# Patient Record
Sex: FEMALE | Race: WHITE | NOT HISPANIC OR LATINO | Employment: PART TIME | URBAN - METROPOLITAN AREA
[De-identification: names, ages, dates, MRNs, and addresses within clinical notes are randomized per-mention and may not be internally consistent; named-entity substitution may affect disease eponyms.]

---
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
## 2020-01-20 ENCOUNTER — TELEPHONE (OUTPATIENT)
Dept: OPHTHALMOLOGY | Facility: CLINIC | Age: 20
End: 2020-01-20

## 2020-01-22 ENCOUNTER — TELEPHONE (OUTPATIENT)
Dept: OPHTHALMOLOGY | Facility: CLINIC | Age: 20
End: 2020-01-22

## 2020-03-06 ENCOUNTER — TELEPHONE (OUTPATIENT)
Dept: OPHTHALMOLOGY | Facility: CLINIC | Age: 20
End: 2020-03-06

## 2020-03-06 NOTE — TELEPHONE ENCOUNTER
Due to a change in the clinic schedule for Dr. Fam, the appointment on 03/27/2020 needs to be rescheduled.      A message was left for patient/family requesting a call back to reschedule the appointment.  The clinic phone number was provided.    Aileen Childers

## 2020-03-18 DIAGNOSIS — Q13.4 PETER'S ANOMALY: ICD-10-CM

## 2020-03-18 RX ORDER — BRIMONIDINE TARTRATE 1 MG/ML
1 SOLUTION/ DROPS OPHTHALMIC 2 TIMES DAILY
Qty: 15 ML | Refills: 3 | Status: SHIPPED | OUTPATIENT
Start: 2020-03-18 | End: 2020-07-15

## 2020-03-18 NOTE — TELEPHONE ENCOUNTER
brimonidine (ALPHAGAN P) 0.1 % ophthalmic solution  Diagnosis:  Peter's anomaly   Requested directions: Place 1 drop into both eyes 2 times daily   Current directions on the medication list: same    Last Written Prescription Date:  3/11/19  Last Fill Quantity: 15 ml,   # refills: 11    Last Office Visit: 9/23/19  Future Office visit: 7/15/20    Attending Provider: Bianka Reyna MD  Last Clinic Note: 9/23/19  Current Ocular Meds:  Dorzolamide  BID, OU and timolol both eyes twice a day   Alphagan BID, OU  Xalatan QHS, OU  FML BID, OU  Refresh PM  QHS PRN   Plan  - Continue current glaucoma drops  - Follow up with Dr. Ash and Dr. Fam annually  - RTC 1 year dilated

## 2020-03-23 DIAGNOSIS — Q15.0 CONGENITAL GLAUCOMA: ICD-10-CM

## 2020-03-23 DIAGNOSIS — Z94.7 CORNEA REPLACED BY TRANSPLANT: ICD-10-CM

## 2020-03-31 RX ORDER — FLUOROMETHOLONE 0.1 %
SUSPENSION, DROPS(FINAL DOSAGE FORM)(ML) OPHTHALMIC (EYE)
Qty: 15 ML | Refills: 11 | Status: SHIPPED | OUTPATIENT
Start: 2020-03-31 | End: 2020-07-15

## 2020-03-31 RX ORDER — LATANOPROST 50 UG/ML
1 SOLUTION/ DROPS OPHTHALMIC AT BEDTIME
Qty: 7.5 ML | Refills: 3 | Status: SHIPPED | OUTPATIENT
Start: 2020-03-31 | End: 2020-07-15

## 2020-03-31 NOTE — TELEPHONE ENCOUNTER
Medication: latanoprost (XALATAN) 0.005 % ophthalmic solution   Diagnosis: Congenital glaucoma   Requested directions: Place 1 drop into both eyes At Bedtime   Current directions on the medication list: same    Last Written Prescription Date:  3/11/19  Last Fill Quantity: 7.5 ml,   # refills: 11    Last Office Visit: 9/23/19  Future Office visit: 9/24/20  Attending Provider: Bianka Reyna MD   Last Clinic Note: Plan  - Continue current glaucoma drops  Follow up with Dr. Ash and Dr. Fam annually  - RTC 1 year dilated     Refill to pharmacy.    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

## 2020-04-20 ENCOUNTER — TELEPHONE (OUTPATIENT)
Dept: OPHTHALMOLOGY | Facility: CLINIC | Age: 20
End: 2020-04-20

## 2020-04-20 NOTE — TELEPHONE ENCOUNTER
Due to the covid-19 pandemic.  The patient's appointment with Dr. Fam on 5/1/20 needs to be deferred for up to 6 months.      Left a voicemail for patient. Due to COVID-19, we are only seeing urgent patients. The appointment on 5/1/20 has been cancelled. We will call to reschedule once clinic resumes normal hours. Clinic phone number was provided for questions.     -Salma Daniel

## 2020-06-05 ENCOUNTER — TELEPHONE (OUTPATIENT)
Dept: OPHTHALMOLOGY | Facility: CLINIC | Age: 20
End: 2020-06-05

## 2020-06-05 NOTE — TELEPHONE ENCOUNTER
Called and left voicemail for patient family about scheduling a new appointment with Dr Fam. Clinic phone number was provided.    Marilin Cannon

## 2020-07-15 ENCOUNTER — OFFICE VISIT (OUTPATIENT)
Dept: OPHTHALMOLOGY | Facility: CLINIC | Age: 20
End: 2020-07-15
Attending: OPHTHALMOLOGY
Payer: COMMERCIAL

## 2020-07-15 DIAGNOSIS — H52.32 ANISOMETROPIA AND ANISEIKONIA: ICD-10-CM

## 2020-07-15 DIAGNOSIS — Q13.4 PETER'S ANOMALY: ICD-10-CM

## 2020-07-15 DIAGNOSIS — H52.31 ANISOMETROPIA AND ANISEIKONIA: ICD-10-CM

## 2020-07-15 DIAGNOSIS — Z94.7 CORNEA REPLACED BY TRANSPLANT: Primary | ICD-10-CM

## 2020-07-15 DIAGNOSIS — Q15.0 CONGENITAL GLAUCOMA: ICD-10-CM

## 2020-07-15 DIAGNOSIS — H02.20C LAGOPHTHALMOS OF BOTH UPPER AND LOWER EYELIDS OF BOTH EYES, UNSPECIFIED LAGOPHTHALMOS TYPE: ICD-10-CM

## 2020-07-15 RX ORDER — LATANOPROST 50 UG/ML
1 SOLUTION/ DROPS OPHTHALMIC AT BEDTIME
Qty: 7.5 ML | Refills: 3 | Status: SHIPPED | OUTPATIENT
Start: 2020-07-15 | End: 2021-07-28

## 2020-07-15 RX ORDER — BRIMONIDINE TARTRATE 1 MG/ML
1 SOLUTION/ DROPS OPHTHALMIC 2 TIMES DAILY
Qty: 15 ML | Refills: 3 | Status: SHIPPED | OUTPATIENT
Start: 2020-07-15 | End: 2021-01-26

## 2020-07-15 RX ORDER — DORZOLAMIDE HYDROCHLORIDE AND TIMOLOL MALEATE 20; 5 MG/ML; MG/ML
1 SOLUTION/ DROPS OPHTHALMIC 2 TIMES DAILY
Qty: 1 BOTTLE | Refills: 11 | Status: SHIPPED | OUTPATIENT
Start: 2020-07-15 | End: 2021-07-28

## 2020-07-15 RX ORDER — FLUOROMETHOLONE 0.1 %
SUSPENSION, DROPS(FINAL DOSAGE FORM)(ML) OPHTHALMIC (EYE)
Qty: 15 ML | Refills: 11 | Status: SHIPPED | OUTPATIENT
Start: 2020-07-15 | End: 2021-07-28

## 2020-07-15 ASSESSMENT — SLIT LAMP EXAM - LIDS
COMMENTS: NORMAL
COMMENTS: NORMAL

## 2020-07-15 ASSESSMENT — REFRACTION_WEARINGRX
OS_CYLINDER: +3.00
OD_CYLINDER: +5.50
OD_AXIS: 120
OS_SPHERE: -6.50
OS_AXIS: 120
OD_SPHERE: -12.50

## 2020-07-15 ASSESSMENT — TONOMETRY
OS_IOP_MMHG: 9
IOP_METHOD: APPLANATION
OD_IOP_MMHG: 11

## 2020-07-15 ASSESSMENT — VISUAL ACUITY
OS_CC: 20/70
METHOD: SNELLEN - LINEAR
OS_CC+: -1
CORRECTION_TYPE: GLASSES
OD_PH_CC: 20/500

## 2020-07-15 ASSESSMENT — CONF VISUAL FIELD
OD_SUPERIOR_NASAL_RESTRICTION: 1
OD_INFERIOR_NASAL_RESTRICTION: 1
OS_INFERIOR_TEMPORAL_RESTRICTION: 1
OD_INFERIOR_TEMPORAL_RESTRICTION: 1
OD_SUPERIOR_TEMPORAL_RESTRICTION: 1
OS_SUPERIOR_TEMPORAL_RESTRICTION: 1

## 2020-07-15 ASSESSMENT — EXTERNAL EXAM - RIGHT EYE: OD_EXAM: MILD HYPOGLOBUS

## 2020-07-15 ASSESSMENT — EXTERNAL EXAM - LEFT EYE: OS_EXAM: NORMAL

## 2020-07-15 NOTE — PROGRESS NOTES
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    Interval Hx:   Patient reports that her vision has been stable. The eyes are comfortable. No eye pain, tearing, redness.      Current Ocular Meds:  Dorzolamide BID, OU  Alphagan BID OU  Xalatan QHS OU  FML BID OU  Refresh PM QHS PRN each eye  Artificial tears PRN each eye      A/P:  1. Pressley anomaly both eyes 2000                        - S/P bilateral penetrating keratoplasty (PK)             - band keratopathy inferiorly OD                        - doing well, grafts clear                        - continue FML twice a day both eyes (IOP good, 7/7 today)                        - f/u 1 year Cornea, sooner PRN             - evaluated by Dr. Garcia and fit for CL's though difficulty with removal; happy with vision in glasses             - patient to consider EDTA chelation vs repeat PKP OD - would recommend EDTA chelation first (discussed extensively)             - f/u with Dr. Fam in 08/20     2. 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 Glaucoma, each eye                         - on xalatan, alphagan, and cosopt (timolol and dorzolamide separately now) OU                        - intraocular pressure good and stable    4. Lagophthalmos, each eye             - clear corneas, pt comfortable                         - continue refresh at bedtime both eyes     Follow up with Dr. Fam in 8/2020 and Dr. Brown in 9/2020 as scheduled    RTC with Dr. Salinas in a year    Deandre Torres MD  Ophthalmology PGY-3    Attending Physician Attestation:  Complete documentation of historical and exam elements from today's encounter can be found in the full encounter summary report (not reduplicated in this progress note).  I personally obtained the chief complaint(s) and history of present  illness.  I confirmed and edited as necessary the review of systems, past medical/surgical history, family history, social history, and examination findings as documented by others; and I examined the patient myself.  I personally reviewed the relevant tests, images, and reports as documented above.  I formulated and edited as necessary the assessment and plan and discussed the findings and management plan with the patient and family. - Carter Salinas MD    I personally spent great than 40min with the patient, of which >50% of the time was spent face to face with the patient, counseling and coordinating care with the patient. We discussed the complexity of her diagnosis, the need for further information prior to proceeding with yet another surgery, and the unknown prognosis for the patient at this time.    Carter Salinas MD

## 2020-07-15 NOTE — NURSING NOTE
No chief complaint on file.     Chief Complaint(s) and History of Present Illness(es)     1 year follow up S/P bilateral penetrating keratoplasty   No concerns of vision either eye per patient.  Dad agrees.  Complains of light sensitivity.  Eye meds:  Dorzolamide  BID, OU and timolol both eyes twice a day   Alphagan BID, OU  Xalatan QHS, OU  FML BID, OU  Refresh PM  QHS PRN    BHANU Juárez 7/15/2020 8:08 AM

## 2020-08-13 ENCOUNTER — TELEPHONE (OUTPATIENT)
Dept: OPHTHALMOLOGY | Facility: CLINIC | Age: 20
End: 2020-08-13

## 2020-08-13 NOTE — TELEPHONE ENCOUNTER
Left a voicemail to confirm the appointment for Friday, 08/14/2020.Advised of clinic changes due to Covid-19 (visitor restrictions, bring own mask, etc.) Clinic phone number provided for questions.    -Aileen Childers

## 2020-08-14 ENCOUNTER — OFFICE VISIT (OUTPATIENT)
Dept: OPHTHALMOLOGY | Facility: CLINIC | Age: 20
End: 2020-08-14
Attending: OPHTHALMOLOGY
Payer: COMMERCIAL

## 2020-08-14 DIAGNOSIS — Q93.59: ICD-10-CM

## 2020-08-14 DIAGNOSIS — H55.01 NYSTAGMUS, CONGENITAL: ICD-10-CM

## 2020-08-14 DIAGNOSIS — Z94.7 CORNEA REPLACED BY TRANSPLANT: ICD-10-CM

## 2020-08-14 DIAGNOSIS — H52.32 ANISOMETROPIA AND ANISEIKONIA: ICD-10-CM

## 2020-08-14 DIAGNOSIS — Q13.4 PETER'S ANOMALY: ICD-10-CM

## 2020-08-14 DIAGNOSIS — H50.111 EXOTROPIA OF RIGHT EYE: ICD-10-CM

## 2020-08-14 DIAGNOSIS — H21.563 PUPIL IRREGULAR OF BOTH EYES: ICD-10-CM

## 2020-08-14 DIAGNOSIS — Q15.0 CONGENITAL GLAUCOMA: Primary | ICD-10-CM

## 2020-08-14 DIAGNOSIS — H52.31 ANISOMETROPIA AND ANISEIKONIA: ICD-10-CM

## 2020-08-14 DIAGNOSIS — H54.3 LOW VISION, BOTH EYES: ICD-10-CM

## 2020-08-14 PROCEDURE — 92015 DETERMINE REFRACTIVE STATE: CPT | Mod: ZF

## 2020-08-14 PROCEDURE — G0463 HOSPITAL OUTPT CLINIC VISIT: HCPCS | Mod: ZF | Performed by: TECHNICIAN/TECHNOLOGIST

## 2020-08-14 ASSESSMENT — VISUAL ACUITY
OS_CC: 20/80
METHOD: SNELLEN - LINEAR
CORRECTION_TYPE: GLASSES
OS_CC+: +1

## 2020-08-14 ASSESSMENT — EXTERNAL EXAM - RIGHT EYE: OD_EXAM: MILD HYPOGLOBUS

## 2020-08-14 ASSESSMENT — EXTERNAL EXAM - LEFT EYE: OS_EXAM: NORMAL

## 2020-08-14 ASSESSMENT — REFRACTION_WEARINGRX
OS_AXIS: 120
OD_SPHERE: -12.50
OS_CYLINDER: +3.00
OD_CYLINDER: +6.00
SPECS_TYPE: SVL
OS_SPHERE: -6.50
OD_AXIS: 120

## 2020-08-14 ASSESSMENT — REFRACTION
OD_CYLINDER: SPHERE
OS_SPHERE: -7.00
OD_SPHERE: -4.50
OS_AXIS: 120
OS_CYLINDER: +3.00

## 2020-08-14 ASSESSMENT — TONOMETRY
IOP_METHOD: ICARE
OS_IOP_MMHG: 17
OD_IOP_MMHG: 16

## 2020-08-14 ASSESSMENT — SLIT LAMP EXAM - LIDS
COMMENTS: NORMAL
COMMENTS: NORMAL

## 2020-08-14 NOTE — LETTER
8/14/2020    To: Cailin Maxwell MD  [facility]  [address]  [facility] [address]    Re:  Ameena Solorzano    YOB: 2000    MRN: [id number]    Dear Colleague,     It was my pleasure to see Ameena on 8/14/2020.  In summary, Ameena Solorzano is a 19 year old female who presents with:     Congenital glaucoma  Excellent intraocular pressure by [employer] today.  - Continue present management: cosopt twice a day, alphagan twice a day, xalatan at bedtime. All both eyes. Establishing 9/2020 with Dr. Brown. Will complete fundus photos as able at upcoming bilateral eye examination under anesthesia as below.    Band keratopathy right eye   Post corneal transplant (PKP) both eyes   Peter's anomaly  Follows with Dr. Salinas. Agree with recommendation to try EDTA to clear right visual axis. May need another transplant in the future.   - Continue present management per Dr. Salinas with fluorometholone twice a day and refresh pm at bedtime both eyes.   - Will schedule bilateral eye examination under anesthesia at time of Dr. Salinas's case to allow for dilated fundus exam and attempt fundus photos.    Anisometropia and aniseikonia  Irregular astigmatism of both eyes  Low vision, both eyes  Best corrected visual acuity in 2019 ranged from 20/125-20/200 right eye. This year has been reduced to 15/600 today and was similarly reduced at her 7/2020 Cornea visit at 20/200 at 3 feet. The left eye visual acuity is stable at 20/80+.  - Updated glasses prescription provided. Continue full time glasses wear.  - Care for band keratopathy to improve visual acuity as above.     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  - Monitor.    Exotropia of right eye  Stable. Monitor.    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 Greatly limits posterior views in clinic.     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     Thank you for the opportunity to care for Ameena. I have asked her to Return in about 1 year (around 8/14/2021).  Until then, please do not hesitate to contact me or my clinic with any questions or concerns.          Warm regards,          Romina Fam MD                 Pediatric Ophthalmology & Strabismus        Department of Ophthalmology & Visual Neurosciences        [facility]   CC:  Ameena Solorzano

## 2020-08-14 NOTE — PATIENT INSTRUCTIONS
8/14/20    For BOTH EYES:   1. Cosopt twice a day   2. Alphagan twice a day   3. Xalatan at bedtime  4. Fluorometholone twice a day   5. Refresh PM at bedtime     Today we discussed that Dr. Fam agrees with plan for EDTA chelation for the right cornea and would like a bilateral eye examination under anesthesia at that time.  Please schedule with Dr. Salinas's office.     Continue to monitor Ameena's visual function and eye alignment until your next visit with us.  If vision or eye alignment appear to be worsening or if you have any new concerns, please contact our office.  A sooner assessment by Dr. Fam or our orthoptic team may be necessary.

## 2020-08-14 NOTE — PROGRESS NOTES
Chief Complaint(s) and History of Present Illness(es)     Glaucoma Follow Up     In both eyes.  Associated symptoms include Negative for photophobia, eye pain, redness and tearing. Additional comments: dykes anomaly s/p PK BE, VA decreased in RE at last visit, WGFT               Comments     Cosopt BID, OU (6:30am)   Alphagan BID OU (6:30am)   Xalatan QHS OU (last night 9:45pm)  FML BID OU (6:30am)  Refresh PM QHS PRN each eye  Artificial tears PRN each eye (not used yet today)            Review of systems for the eyes was negative other than the pertinent positives and negatives noted in the HPI. History is obtained from the patient and father.     Primary care: Cailin Maxwell   Referring provider: Cailin Maxwell  [facility] is home  Assessment & Plan   Ameena Solorzano is a 19 year old female who presents with:     Congenital glaucoma  Excellent intraocular pressure by [employer] today.  - Continue present management: cosopt twice a day, alphagan twice a day, xalatan at bedtime. All both eyes. Establishing 9/2020 with Dr. Brown. Will complete fundus photos as able at upcoming bilateral eye examination under anesthesia as below.    Band keratopathy right eye   Post corneal transplant (PKP) both eyes   Peter's anomaly  Follows with Dr. Salinas. Agree with recommendation to try EDTA to clear right visual axis. May need another transplant in the future.   - Continue present management per Dr. Salinas with fluorometholone twice a day and refresh pm at bedtime both eyes.   - Will schedule bilateral eye examination under anesthesia at time of Dr. Salinas's case to allow for dilated fundus exam and attempt fundus photos.    Anisometropia and aniseikonia  Irregular astigmatism of both eyes  Low vision, both eyes  Best corrected visual acuity in 2019 ranged from 20/125-20/200 right eye. This year has been reduced to 15/600 today and was similarly reduced at her 7/2020 Cornea visit at 20/200 at 3 feet. The left eye visual acuity is  stable at 20/80+.  - Updated glasses prescription provided. Continue full time glasses wear.  - Care for band keratopathy to improve visual acuity as above.     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  - Monitor.    Exotropia of right eye  Stable. Monitor.    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 Greatly limits posterior views in clinic.     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       Return in about 1 year (around 8/14/2021). clinic; bilateral eye examination under anesthesia when able to schedule with Dr. Salinas    Patient Instructions   8/14/20    For BOTH EYES:   1. Cosopt twice a day   2. Alphagan twice a day   3. Xalatan at bedtime  4. Fluorometholone twice a day   5. Refresh PM at bedtime     Today we discussed that Dr. Fam agrees with plan for EDTA chelation for the right cornea and would like a bilateral eye examination under anesthesia at that time.  Please schedule with Dr. Salinas's office.     Continue to monitor Nancys visual function and eye alignment until your next visit with us.  If vision or eye alignment appear to be worsening or if you have any new concerns, please contact our office.  A sooner assessment by Dr. Fam or our orthoptic team may be necessary.          Visit Diagnoses & Orders    ICD-10-CM    1. Congenital glaucoma  Q15.0    2. 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 Peter's anomaly  Q13.89    4. Anisometropia and aniseikonia  H52.31    5. Low vision, both eyes  H54.3    6. Nystagmus, congenital  H55.01    7. Exotropia of right eye  H50.10    8. Pupil irregular of both eyes  H21.563    9. 6p partial monosomy syndrome  Q93.59       Attending Physician Attestation:  Complete documentation of historical and exam elements from today's encounter can be found in the full encounter summary report (not reduplicated in this progress note).  I personally  obtained the chief complaint(s) and history of present illness.  I confirmed and edited as necessary the review of systems, past medical/surgical history, family history, social history, and examination findings as documented by others; and I examined the patient myself.  I personally reviewed the relevant tests, images, and reports as documented above.  I formulated and edited as necessary the assessment and plan and discussed the findings and management plan with the patient and family. - Romina Fam MD

## 2020-08-14 NOTE — NURSING NOTE
Chief Complaint(s) and History of Present Illness(es)     Glaucoma Follow Up     Laterality: both eyes    Associated symptoms: Negative for photophobia, eye pain, redness and tearing    Comments: dykes anomaly s/p PK BE, VA decreased in RE at last visit, WGFT               Comments     Dorzolamide BID, OU (6:30am)   Alphagan BID OU (6:30am)   Xalatan QHS OU (last night 9:45pm)  FML BID OU (6:30am)  Refresh PM QHS PRN each eye  Artificial tears PRN each eye (not used yet today)

## 2020-08-17 ENCOUNTER — TRANSFERRED RECORDS (OUTPATIENT)
Dept: HEALTH INFORMATION MANAGEMENT | Facility: CLINIC | Age: 20
End: 2020-08-17

## 2020-08-18 ENCOUNTER — PREP FOR PROCEDURE (OUTPATIENT)
Dept: OPHTHALMOLOGY | Facility: CLINIC | Age: 20
End: 2020-08-18

## 2020-08-18 DIAGNOSIS — H18.421 BAND KERATOPATHY OF RIGHT EYE: Primary | ICD-10-CM

## 2020-08-21 ENCOUNTER — TELEPHONE (OUTPATIENT)
Dept: OPHTHALMOLOGY | Facility: CLINIC | Age: 20
End: 2020-08-21

## 2020-08-21 ENCOUNTER — PREP FOR PROCEDURE (OUTPATIENT)
Dept: OPHTHALMOLOGY | Facility: CLINIC | Age: 20
End: 2020-08-21

## 2020-08-21 DIAGNOSIS — Z11.59 ENCOUNTER FOR SCREENING FOR OTHER VIRAL DISEASES: Primary | ICD-10-CM

## 2020-08-21 PROBLEM — H18.421 BAND KERATOPATHY OF RIGHT EYE: Status: ACTIVE | Noted: 2020-08-21

## 2020-08-21 NOTE — TELEPHONE ENCOUNTER
Patient is scheduled for surgery with Dr. Carter Salinas and Dr. Fam     Spoke with: Kalpana     Date of Surgery: 09/03/20     Location: [facility], [facility]:  [address] [place] [id number]     Informed patient they will need an adult : Yes     H&P will be completed at: [facility]     Post Op scheduled on 09/04, 09/09, and 10/07     Surgery packet was mailed 08/21     Additional comments: Advised RN will call 1 - 2 business days prior with arrival time and instructions.

## 2020-08-24 ENCOUNTER — TELEPHONE (OUTPATIENT)
Dept: OPHTHALMOLOGY | Facility: CLINIC | Age: 20
End: 2020-08-24

## 2020-08-24 NOTE — TELEPHONE ENCOUNTER
Called and spoke with Andres. He requested the name of the ENT provider I work with most commonly. Provided Dr. Evert Vann's name and info. They will call to see if they can coordinate an ENT exam at the time of her upcoming EUA.

## 2020-08-24 NOTE — TELEPHONE ENCOUNTER
[facility] Call Center    Phone Message    May a detailed message be left on voicemail: yes     Reason for Call: Other: Pt's Father has a question for Dr Fam about surgery for his daughter  , Please call Pt's Father back to discuss  Thank you,  Called Nathalie>> >> Carmela    Action Taken: Message routed to:  [facility] ([facility] Peds [facility]    Travel Screening: Not Applicable

## 2020-08-27 RX ORDER — CYCLOPENTOLAT/TROPIC/PHENYLEPH 1%-1%-2.5%
1 DROPS (EA) OPHTHALMIC (EYE)
Status: CANCELLED | OUTPATIENT
Start: 2020-08-27

## 2020-08-27 RX ORDER — MOXIFLOXACIN 5 MG/ML
1 SOLUTION/ DROPS OPHTHALMIC
Status: CANCELLED | OUTPATIENT
Start: 2020-08-27

## 2020-08-27 RX ORDER — PROPARACAINE HYDROCHLORIDE 5 MG/ML
1 SOLUTION/ DROPS OPHTHALMIC ONCE
Status: CANCELLED | OUTPATIENT
Start: 2020-08-27 | End: 2020-08-27

## 2020-08-31 DIAGNOSIS — Z11.59 ENCOUNTER FOR SCREENING FOR OTHER VIRAL DISEASES: ICD-10-CM

## 2020-08-31 PROCEDURE — U0003 INFECTIOUS AGENT DETECTION BY NUCLEIC ACID (DNA OR RNA); SEVERE ACUTE RESPIRATORY SYNDROME CORONAVIRUS 2 (SARS-COV-2) (CORONAVIRUS DISEASE [COVID-19]), AMPLIFIED PROBE TECHNIQUE, MAKING USE OF HIGH THROUGHPUT TECHNOLOGIES AS DESCRIBED BY CMS-2020-01-R: HCPCS | Performed by: OPHTHALMOLOGY

## 2020-08-31 RX ORDER — CYCLOPENTOLAT/TROPIC/PHENYLEPH 1%-1%-2.5%
1 DROPS (EA) OPHTHALMIC (EYE)
Status: CANCELLED | OUTPATIENT
Start: 2020-08-31

## 2020-09-01 LAB
SARS-COV-2 RNA SPEC QL NAA+PROBE: NOT DETECTED
SPECIMEN SOURCE: NORMAL

## 2020-09-03 ENCOUNTER — ANESTHESIA (OUTPATIENT)
Dept: SURGERY | Facility: CLINIC | Age: 20
End: 2020-09-03
Payer: COMMERCIAL

## 2020-09-03 ENCOUNTER — HOSPITAL ENCOUNTER (OUTPATIENT)
Facility: CLINIC | Age: 20
Discharge: HOME | End: 2020-09-03
Attending: OPHTHALMOLOGY | Admitting: OPHTHALMOLOGY
Payer: COMMERCIAL

## 2020-09-03 ENCOUNTER — ANESTHESIA EVENT (OUTPATIENT)
Dept: SURGERY | Facility: CLINIC | Age: 20
End: 2020-09-03
Payer: COMMERCIAL

## 2020-09-03 VITALS
TEMPERATURE: 97.9 F | HEIGHT: 59 IN | SYSTOLIC BLOOD PRESSURE: 126 MMHG | BODY MASS INDEX: 21.69 KG/M2 | WEIGHT: 107.58 LBS | HEART RATE: 104 BPM | DIASTOLIC BLOOD PRESSURE: 77 MMHG | OXYGEN SATURATION: 100 % | RESPIRATION RATE: 16 BRPM

## 2020-09-03 DIAGNOSIS — H18.421 BAND KERATOPATHY OF RIGHT EYE: ICD-10-CM

## 2020-09-03 DIAGNOSIS — H55.01 CONGENITAL NYSTAGMUS: ICD-10-CM

## 2020-09-03 DIAGNOSIS — Q13.4 PETERS ANOMALY: ICD-10-CM

## 2020-09-03 DIAGNOSIS — H18.421 BAND KERATOPATHY OF RIGHT EYE: Primary | ICD-10-CM

## 2020-09-03 PROCEDURE — 25000128 H RX IP 250 OP 636: Performed by: OPHTHALMOLOGY

## 2020-09-03 PROCEDURE — 71000027 ZZH RECOVERY PHASE 2 EACH 15 MINS: Performed by: OPHTHALMOLOGY

## 2020-09-03 PROCEDURE — 36000059 ZZH SURGERY LEVEL 3 EA 15 ADDTL MIN UMMC: Performed by: OPHTHALMOLOGY

## 2020-09-03 PROCEDURE — 71000014 ZZH RECOVERY PHASE 1 LEVEL 2 FIRST HR: Performed by: OPHTHALMOLOGY

## 2020-09-03 PROCEDURE — 27210794 ZZH OR GENERAL SUPPLY STERILE: Performed by: OPHTHALMOLOGY

## 2020-09-03 PROCEDURE — 76499 UNLISTED DX RADIOGRAPHIC PX: CPT

## 2020-09-03 PROCEDURE — 25000128 H RX IP 250 OP 636: Performed by: NURSE ANESTHETIST, CERTIFIED REGISTERED

## 2020-09-03 PROCEDURE — 36000057 ZZH SURGERY LEVEL 3 1ST 30 MIN - UMMC: Performed by: OPHTHALMOLOGY

## 2020-09-03 PROCEDURE — 25800030 ZZH RX IP 258 OP 636: Performed by: STUDENT IN AN ORGANIZED HEALTH CARE EDUCATION/TRAINING PROGRAM

## 2020-09-03 PROCEDURE — 25000566 ZZH SEVOFLURANE, EA 15 MIN: Performed by: OPHTHALMOLOGY

## 2020-09-03 PROCEDURE — 25000128 H RX IP 250 OP 636: Performed by: STUDENT IN AN ORGANIZED HEALTH CARE EDUCATION/TRAINING PROGRAM

## 2020-09-03 PROCEDURE — 37000009 ZZH ANESTHESIA TECHNICAL FEE, EACH ADDTL 15 MIN: Performed by: OPHTHALMOLOGY

## 2020-09-03 PROCEDURE — 92018 COMPL OPH EXAM GENERAL ANES: CPT

## 2020-09-03 PROCEDURE — 25000125 ZZHC RX 250: Performed by: OPHTHALMOLOGY

## 2020-09-03 PROCEDURE — 25000125 ZZHC RX 250: Performed by: STUDENT IN AN ORGANIZED HEALTH CARE EDUCATION/TRAINING PROGRAM

## 2020-09-03 PROCEDURE — 40000170 ZZH STATISTIC PRE-PROCEDURE ASSESSMENT II: Performed by: OPHTHALMOLOGY

## 2020-09-03 PROCEDURE — 92250 FUNDUS PHOTOGRAPHY W/I&R: CPT

## 2020-09-03 PROCEDURE — 37000008 ZZH ANESTHESIA TECHNICAL FEE, 1ST 30 MIN: Performed by: OPHTHALMOLOGY

## 2020-09-03 RX ORDER — SODIUM CHLORIDE, SODIUM LACTATE, POTASSIUM CHLORIDE, CALCIUM CHLORIDE 600; 310; 30; 20 MG/100ML; MG/100ML; MG/100ML; MG/100ML
INJECTION, SOLUTION INTRAVENOUS CONTINUOUS PRN
Status: DISCONTINUED | OUTPATIENT
Start: 2020-09-03 | End: 2020-09-03

## 2020-09-03 RX ORDER — BALANCED SALT SOLUTION 6.4; .75; .48; .3; 3.9; 1.7 MG/ML; MG/ML; MG/ML; MG/ML; MG/ML; MG/ML
SOLUTION OPHTHALMIC PRN
Status: DISCONTINUED | OUTPATIENT
Start: 2020-09-03 | End: 2020-09-03 | Stop reason: HOSPADM

## 2020-09-03 RX ORDER — DEXAMETHASONE SODIUM PHOSPHATE 4 MG/ML
INJECTION, SOLUTION INTRA-ARTICULAR; INTRALESIONAL; INTRAMUSCULAR; INTRAVENOUS; SOFT TISSUE PRN
Status: DISCONTINUED | OUTPATIENT
Start: 2020-09-03 | End: 2020-09-03

## 2020-09-03 RX ORDER — FENTANYL CITRATE 50 UG/ML
INJECTION, SOLUTION INTRAMUSCULAR; INTRAVENOUS PRN
Status: DISCONTINUED | OUTPATIENT
Start: 2020-09-03 | End: 2020-09-03

## 2020-09-03 RX ORDER — PROPOFOL 10 MG/ML
INJECTION, EMULSION INTRAVENOUS PRN
Status: DISCONTINUED | OUTPATIENT
Start: 2020-09-03 | End: 2020-09-03

## 2020-09-03 RX ORDER — MOXIFLOXACIN IN NACL,ISO-OS/PF 0.3MG/0.3
SYRINGE (ML) INTRAOCULAR PRN
Status: DISCONTINUED | OUTPATIENT
Start: 2020-09-03 | End: 2020-09-03 | Stop reason: HOSPADM

## 2020-09-03 RX ORDER — CYCLOPENTOLAT/TROPIC/PHENYLEPH 1%-1%-2.5%
DROPS (EA) OPHTHALMIC (EYE) PRN
Status: DISCONTINUED | OUTPATIENT
Start: 2020-09-03 | End: 2020-09-03 | Stop reason: HOSPADM

## 2020-09-03 RX ORDER — FENTANYL CITRATE 50 UG/ML
0.5 INJECTION, SOLUTION INTRAMUSCULAR; INTRAVENOUS EVERY 10 MIN PRN
Status: DISCONTINUED | OUTPATIENT
Start: 2020-09-03 | End: 2020-09-03 | Stop reason: HOSPADM

## 2020-09-03 RX ORDER — MORPHINE SULFATE 2 MG/ML
0.05 INJECTION, SOLUTION INTRAMUSCULAR; INTRAVENOUS
Status: DISCONTINUED | OUTPATIENT
Start: 2020-09-03 | End: 2020-09-03 | Stop reason: HOSPADM

## 2020-09-03 RX ORDER — LIDOCAINE HYDROCHLORIDE 20 MG/ML
INJECTION, SOLUTION INFILTRATION; PERINEURAL PRN
Status: DISCONTINUED | OUTPATIENT
Start: 2020-09-03 | End: 2020-09-03

## 2020-09-03 RX ORDER — ONDANSETRON 2 MG/ML
INJECTION INTRAMUSCULAR; INTRAVENOUS PRN
Status: DISCONTINUED | OUTPATIENT
Start: 2020-09-03 | End: 2020-09-03

## 2020-09-03 RX ADMIN — SODIUM CHLORIDE, POTASSIUM CHLORIDE, SODIUM LACTATE AND CALCIUM CHLORIDE: 600; 310; 30; 20 INJECTION, SOLUTION INTRAVENOUS at 14:46

## 2020-09-03 RX ADMIN — PROPOFOL 200 MG: 10 INJECTION, EMULSION INTRAVENOUS at 14:51

## 2020-09-03 RX ADMIN — FENTANYL CITRATE 25 MCG: 50 INJECTION, SOLUTION INTRAMUSCULAR; INTRAVENOUS at 15:21

## 2020-09-03 RX ADMIN — DEXAMETHASONE SODIUM PHOSPHATE 8 MG: 4 INJECTION, SOLUTION INTRAMUSCULAR; INTRAVENOUS at 14:58

## 2020-09-03 RX ADMIN — MIDAZOLAM 1 MG: 1 INJECTION INTRAMUSCULAR; INTRAVENOUS at 14:46

## 2020-09-03 RX ADMIN — FENTANYL CITRATE 50 MCG: 50 INJECTION, SOLUTION INTRAMUSCULAR; INTRAVENOUS at 14:51

## 2020-09-03 RX ADMIN — LIDOCAINE HYDROCHLORIDE 100 MG: 20 INJECTION, SOLUTION INFILTRATION; PERINEURAL at 14:51

## 2020-09-03 RX ADMIN — ONDANSETRON 4 MG: 2 INJECTION INTRAMUSCULAR; INTRAVENOUS at 15:54

## 2020-09-03 RX ADMIN — MIDAZOLAM 2 MG: 1 INJECTION INTRAMUSCULAR; INTRAVENOUS at 14:43

## 2020-09-03 ASSESSMENT — MIFFLIN-ST. JEOR: SCORE: 1168.63

## 2020-09-03 NOTE — BRIEF OP NOTE
[facility], [address]    Brief Operative Note    Pre-operative diagnosis: Band keratopathy of right eye [H18.421]  Post-operative diagnosis Same as pre-operative diagnosis    Procedure: Procedure(s):  SCRUBBING, CORNEA, USING ETHYLENEDIAMINETETRAACETIC ACID - RIGHT EYE  EXAM UNDER ANESTHESIA, BILATERAL EYE, WITH RETCAM PHOTOS AND A/B ULTRASOUND  Surgeon: Surgeon(s) and Role:  Panel 1:     * Carter Salinas MD - Primary     * Mikayla Becerril MD - Resident - Assisting  Panel 2:     * Romina Fam MD - Primary     * Sharla Marie MD - Resident - Assisting  Anesthesia: General   Estimated blood loss: Minimal  Drains: None  Specimens: * No specimens in log *  Findings:   None.  Complications: None.  Implants: * No implants in log *      Sharla Marie MD  Ophthalmology Resident, PGY-3

## 2020-09-03 NOTE — ANESTHESIA CARE TRANSFER NOTE
Patient: Ameena Solorzano    Procedure(s):  SCRUBBING, CORNEA, USING ETHYLENEDIAMINETETRAACETIC ACID - RIGHT EYE  EXAM UNDER ANESTHESIA, BILATERAL EYE, WITH RETCAM PHOTOS AND A/B ULTRASOUND    Diagnosis: Band keratopathy of right eye [H18.421]  Diagnosis Additional Information: No value filed.    Anesthesia Type:   General     Note:  Airway :Face Mask  Patient transferred to:PACU  Handoff Report: Identifed the Patient, Identified the Reponsible Provider, Reviewed the pertinent medical history, Discussed the surgical course, Reviewed Intra-OP anesthesia mangement and issues during anesthesia, Set expectations for post-procedure period and Allowed opportunity for questions and acknowledgement of understanding      Vitals: (Last set prior to Anesthesia Care Transfer)    CRNA VITALS  9/3/2020 1603 - 9/3/2020 1639      9/3/2020             NIBP:  108/63    Ht Rate:  77    Temp:  36.7  C (98.1  F)    SpO2:  100 %    EKG:  Sinus rhythm                Electronically Signed By: MARLEE Scott CRNA  September 3, 2020  4:39 PM

## 2020-09-03 NOTE — OR NURSING
Patient slightly tachycardic. Up to 130's when first woke up. ST elevation in lead 2, present in OR per CRNA report. Dr. GERARDO Perez notified. Will order 500mL bolus LR now.

## 2020-09-03 NOTE — OR NURSING
0.6cc of sterile of water mixed in with EDTA from 10 purple top lab tubes, prepped and mixed by opthalmology fellow and Dr Salinas.

## 2020-09-03 NOTE — ANESTHESIA PREPROCEDURE EVALUATION
"Anesthesia Pre-Procedure Evaluation    Patient: Ameena Solorzano   MRN:     [id number] Gender:   female   Age:    19 year old :      2000        Preoperative Diagnosis: Band keratopathy of right eye [H18.421]   Procedure(s):  SCRUBBING, CORNEA, USING ETHYLENEDIAMINETETRAACETIC ACID - RIGHT EYE  EXAM UNDER ANESTHESIA, BILATERAL EYE, WITH RETCAM PHOTOS     LABS:  CBC:   Lab Results   Component Value Date    WBC 5.9 2006    HGB 10.9 2006    HCT 32.9 2006     2006     BMP: No results found for: NA, POTASSIUM, CHLORIDE, CO2, BUN, CR, GLC  COAGS: No results found for: PTT, INR, FIBR  POC: No results found for: BGM, HCG, HCGS  OTHER: No results found for: PH, LACT, A1C, LEONARD, PHOS, MAG, ALBUMIN, PROTTOTAL, ALT, AST, GGT, ALKPHOS, BILITOTAL, BILIDIRECT, LIPASE, AMYLASE, GILBERTO, TSH, T4, T3, CRP, SED     Preop Vitals    BP Readings from Last 3 Encounters:   20 109/83    Pulse Readings from Last 3 Encounters:   20 114      Resp Readings from Last 3 Encounters:   20 18    SpO2 Readings from Last 3 Encounters:   20 100%      Temp Readings from Last 1 Encounters:   20 36.4  C (97.5  F) (Axillary)    Ht Readings from Last 1 Encounters:   20 1.499 m (4' 11\") (2 %, Z= -2.07)*     * Growth percentiles are based on CDC (Girls, 2-20 Years) data.      Wt Readings from Last 1 Encounters:   20 48.8 kg (107 lb 9.4 oz) (11 %, Z= -1.20)*     * Growth percentiles are based on CDC (Girls, 2-20 Years) data.    Estimated body mass index is 21.73 kg/m  as calculated from the following:    Height as of this encounter: 1.499 m (4' 11\").    Weight as of this encounter: 48.8 kg (107 lb 9.4 oz).     LDA:        Past Medical History:   Diagnosis Date     6p partial monosomy syndrome      Dandy-Walker syndrome (H)      Dandy-Walker syndrome (H)      Glaucoma      Immune deficiency disorder (H)      Nystagmus      Peter's anomaly      Ptosis      Strabismus       Past " Surgical History:   Procedure Laterality Date     C ANESTH,CORNEAL TRANSPLANT       g tube removed  12/2002     GASTROSTOMY TUBE  2/2001     HERNIA REPAIR  3/2009     HERNIA REPAIR  11/2009     KERATOPLASTY PENETRATING      BE     PE TUBES      x 5     REPAIR VENTRICULAR SEPTAL DEFECT  1/2001     REVISE SHUNT VENTRICULARPERITONEAL CHILD  1/2001     TYMPANOPLASTY, RT/LT Left 12/2007     TYMPANOPLASTY, RT/LT Right 6/2008      Allergies   Allergen Reactions     Latex      Seasonal Allergies         Anesthesia Evaluation    ROS/Med Hx    No history of anesthetic complications    Cardiovascular Findings   Comments: S/p VSD repair, PFO closure, TV valvuloplasty.    TTE (August 2020):    No residual shunt via closed VSD, mild As and AI, mild TR.    Neuro Findings   (+) developmental delay  Comments: H/o Dandy Walker syndrome.     Pulmonary Findings - negative ROS    HENT Findings - negative HENT ROS        GI/Hepatic/Renal Findings - negative ROS    Endocrine/Metabolic Findings - negative ROS      Genetic/Syndrome Findings - negative genetics/syndromes ROS              PHYSICAL EXAM:   Mental Status/Neuro: A/A/O   Airway: Facies: Feasible  Mallampati: Not Assessed  Mouth/Opening: Full  TM distance: > 6 cm  Neck ROM: Full   Respiratory: Auscultation: CTAB     Resp. Rate: Normal     Resp. Effort: Normal      CV: Rhythm: Regular  Rate: Age appropriate  Heart: Normal Sounds  Edema: None   Comments:      Dental: Normal Dentition                Assessment:   ASA SCORE: 3    H&P: History and physical reviewed and following examination; no interval change.         Plan:   Anes. Type:  General   Pre-Medication: Midazolam; Acetaminophen   Induction:  IV (Standard)   Airway: ETT; Oral   Access/Monitoring: PIV   Maintenance: Balanced     Postop Plan:   Postop Pain: Opioids  Postop Sedation/Airway: Not planned     PONV Management:   Adult Risk Factors: Female, Postop Opioids   Prevention: Ondansetron, Dexamethasone     CONSENT: Direct  conversation   Plan and risks discussed with: Patient; Mother; Father                Darrell Dempsey MD

## 2020-09-04 ENCOUNTER — OFFICE VISIT (OUTPATIENT)
Dept: OPHTHALMOLOGY | Facility: CLINIC | Age: 20
End: 2020-09-04
Attending: STUDENT IN AN ORGANIZED HEALTH CARE EDUCATION/TRAINING PROGRAM
Payer: COMMERCIAL

## 2020-09-04 DIAGNOSIS — Z98.890 POSTSURGICAL STATES FOLLOWING SURGERY OF EYE AND ADNEXA: ICD-10-CM

## 2020-09-04 DIAGNOSIS — Q13.4 PETER'S ANOMALY: Primary | ICD-10-CM

## 2020-09-04 DIAGNOSIS — Z94.7 CORNEA REPLACED BY TRANSPLANT: ICD-10-CM

## 2020-09-04 PROCEDURE — G0463 HOSPITAL OUTPT CLINIC VISIT: HCPCS | Mod: ZF

## 2020-09-04 ASSESSMENT — SLIT LAMP EXAM - LIDS
COMMENTS: NORMAL
COMMENTS: NORMAL

## 2020-09-04 ASSESSMENT — VISUAL ACUITY
OS_SC: 20/70
OD_PH_CC: 20/150
OD_SC: 20/400
OD_CC: 20/200
OS_CC+: -1+1
OS_CC: 20/60
METHOD: SNELLEN - LINEAR
OS_SC+: -1

## 2020-09-04 ASSESSMENT — CONF VISUAL FIELD
OD_SUPERIOR_NASAL_RESTRICTION: 1
OS_SUPERIOR_TEMPORAL_RESTRICTION: 3
OD_SUPERIOR_TEMPORAL_RESTRICTION: 1
OD_INFERIOR_TEMPORAL_RESTRICTION: 1
METHOD: COUNTING FINGERS
OS_INFERIOR_TEMPORAL_RESTRICTION: 3
OD_INFERIOR_NASAL_RESTRICTION: 1

## 2020-09-04 ASSESSMENT — TONOMETRY
OS_IOP_MMHG: 10
IOP_METHOD: ICARE

## 2020-09-04 ASSESSMENT — CUP TO DISC RATIO: OS_RATIO: 0.3

## 2020-09-04 NOTE — PROGRESS NOTES
CC: Dykes' anomaly --- here for POD1 visit s/p EDTA chelation right eye     HPI: 20 yo F w/ Hx of dykes anomaly s/p PK BE, with history of elevated eye pressures.    Interval Hx:   POD1 visit s/p EDTA chelation right eye 9/3/2020.  Did okay overnight.  Denies pain, flashes/floaters.  Has not used gtts yet.       Current Ocular Meds:  Dorzolamide BID, OU  Alphagan BID OU  Xalatan QHS OU  FML BID OU  Refresh PM QHS PRN each eye  Artificial tears PRN each eye      A/P:    0. POD1 s/p EDTA chelation right eye 9/3/2020   - doing well POD 1, BCL in place, exam as expected.    - start PF QID and ofloxacin QID right eye    - avoid water to eye, no eye rubbing   - return precautions discussed   - RTC 1 week POW 1 visit / sooner prn     1. Dykes anomaly both eyes 2000                        - S/P bilateral penetrating keratoplasty (PK)             - band keratopathy inferiorly right eye now s/p EDTA chelation (See above), but still with remaining scarring.                        - doing well, grafts clear                        - continue FML twice a day both eyes (IOP good, 7/7 today)                        - f/u 1 year Cornea, sooner PRN             - evaluated by Dr. Garcia and fit for CL's though difficulty with removal; happy with vision in glasses             - patient to consider EDTA chelation vs repeat PKP OD - would recommend EDTA chelation first (discussed extensively)             - f/u with Dr. Fam in 08/20     2. 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 Glaucoma, each eye                         - on xalatan, alphagan, and cosopt (timolol and dorzolamide separately now) OU                        - intraocular pressure good and stable    4. Lagophthalmos, each eye             - clear corneas, pt comfortable                         - continue refresh at bedtime both  eyes    RTC 1 week, sooner prn      Jason Goldberg, MD  Cornea, External Disease & Refractive Surgery Fellow  Department of Ophthalmology and Visual Neurosciences    Attending Physician Attestation:  Complete documentation of historical and exam elements from today's encounter can be found in the full encounter summary report (not reduplicated in this progress note).  I personally obtained the chief complaint(s) and history of present illness.  I confirmed and edited as necessary the review of systems, past medical/surgical history, family history, social history, and examination findings as documented by others; and I examined the patient myself.  I personally reviewed the relevant tests, images, and reports as documented above.  I formulated and edited as necessary the assessment and plan and discussed the findings and management plan with the patient and family. - Jason S. Goldberg, MD

## 2020-09-04 NOTE — PATIENT INSTRUCTIONS
Start prednisolone acetate 4x/day right eye (pink top -- shake well before using) (hold FML for now)  Start moxifloxacin 4x/day right eye     Continue all other drops / medications as previously.    Avoid water to eye, no eye rubbing.   Shield at bedtime.

## 2020-09-04 NOTE — ANESTHESIA POSTPROCEDURE EVALUATION
Anesthesia POST Procedure Evaluation    Patient: Ameena Solorzano   MRN:     [id number] Gender:   female   Age:    19 year old :      2000        Preoperative Diagnosis: Band keratopathy of right eye [H18.421]   Procedure(s):  SCRUBBING, CORNEA, USING ETHYLENEDIAMINETETRAACETIC ACID - RIGHT EYE  EXAM UNDER ANESTHESIA, BILATERAL EYE, WITH RETCAM PHOTOS AND A/B ULTRASOUND   Postop Comments: No value filed.     Anesthesia Type: General       Disposition: Outpatient   Postop Pain Control: Uneventful            Sign Out: Well controlled pain   PONV: No   Neuro/Psych: Uneventful            Sign Out: Acceptable/Baseline neuro status   Airway/Respiratory: Uneventful            Sign Out: Acceptable/Baseline resp. status   CV/Hemodynamics: Uneventful            Sign Out: Acceptable CV status   Other NRE: NONE   DID A NON-ROUTINE EVENT OCCUR? No         Last Anesthesia Record Vitals:  CRNA VITALS  9/3/2020 1603 - 9/3/2020 1703      9/3/2020             NIBP:  108/63    Ht Rate:  77    Temp:  36.7  C (98.1  F)    SpO2:  100 %    EKG:  Sinus rhythm          Last PACU Vitals:  Vitals Value Taken Time   /82 9/3/2020  5:30 PM   Temp 36.5  C (97.7  F) 9/3/2020  5:00 PM   Pulse 99 9/3/2020  5:30 PM   Resp 12 9/3/2020  5:30 PM   SpO2 100 % 9/3/2020  5:30 PM   Temp src     NIBP 108/63 9/3/2020  4:36 PM   Pulse     SpO2 100 % 9/3/2020  4:36 PM   Resp     Temp 36.7  C (98.1  F) 9/3/2020  4:36 PM   Ht Rate 77 9/3/2020  4:36 PM   Temp 2     Vitals shown include unvalidated device data.      Electronically Signed By: Selma Perez MD, September 3, 2020, 8:05 PM

## 2020-09-08 NOTE — OP NOTE
OPHTHALMOLOGY OPERATIVE REPORT    PATIENT:  Ameena Solorzano   YOB: 2000   MEDICAL RECORD NUMBER:  [id number]     DATE OF SURGERY:  9/8/2020   LOCATION: [facility]  ANESTHESIA TYPE:  General    SURGEON:  Romina Fam MD    ASSISTANTS:  Sharla Marie MD     PREOPERATIVE DIAGNOSES:    1. Band keratopathy right eye   2. Corneal Neovascularization, both eyes  3. Congenital glaucoma, both eyes   4. Pupillary abnormalities, both eyes   5. Peter's anomaly both eyes; Status-post pentrating keratoplasty (PKP) both eyes   6. Anisometropia and aniseikonia    POSTOPERATIVE DIAGNOSES:    Same as preoperative diagnosis     PROCEDURES:    - Bilateral eye examination under anesthesia  - Fundus Photography, left eye     IMPLANTS:   None     COMPLICATIONS:  None    ESTIMATED BLOOD LOSS:  none      IV FLUIDS:  Per Anesthesia    DISPOSITION:  Ameena was stable for transfer to the postoperative recovery unit upon completion of the procedures.    DETAILS OF THE PROCEDURE:       On the day of surgery, I, Romina Fam MD, met the patient, Ameena Solorzano, in the preoperative holding area with her family.  I identified the patient and operative sites and marked them on the preoperative marking sheet.  The indications, risks, benefits, and alternatives for the planned procedure were again discussed with the patient and family.  I answered their questions, and they agreed to proceed.  The patient was then transported to the operating room where she was placed under general anesthesia by the anesthesiologist and Dr. Salinas's case with EDTA chelation of the right cornea was completed. The case was then turned over to our team. I participated in a preoperative briefing and time-out and personally identified the patient, surgical plan, and operative site(s).    We proceeded with Ameena's eye examination under anesthesia utilizing the portable slit lamp and indirect ophthalmoscope:  Slit  Lamp and Fundus Exam     External Exam       Right Left    External Mild Hypoglobus, proptotic in preop, stable  scleral show, less proptotic than R, stable          Slit Lamp Exam       Right Left    Lids/Lashes Normal Normal    Conjunctiva/Sclera superonasal isolated cystic bleb, thin, Joleen negative White and quiet    Cornea PKP with dense inferior neovascularization, corneal epi defect s/p EDTA, small cornea clear PKP; neovascularization to graft host junction inferiorly    Anterior Chamber shallow, IK touch superiorly, temporally and nasally moderate depth centrally    Iris irregular oblong vertical, eccentric pupil Pharmacologically dilated with pupil size ~4mm, peaked pupil inferotemporally, Iris atrophy from 3 to 4:30.     Lens limited view, appears clear - phakic Clear    Vitreous clear clear          Fundus Exam       Right Left    Disc poor view due to small aperture and corneal changes, optic nerve visible only as contrast to red retina small nerve, 360 peripapillary atrophy, amorphous cup edge nasally - somewhat obscured by vessels    C/D Ratio  0.3    Macula  Normal    Vessels  Normal    Periphery poor view, good red reflex minimally elevated circular nevus at 4 o'clock in the far periphary just under 1 disc diameter, elevation only apparent on scleral depression, no orange SRF, no drusen, crisp margins                  Indicated studies were performed as reported below. Gentle B-scan was completed for both eyes with extra caution for the right eye given her thin, cystic bleb.     The patient was stable at the end of the eye exam and there were no complications. Dr. Fam was present for the entire procedure. The anesthesiologist reversed anesthesia and Ameena was stable for transfer to the post-operative recovery unit.    Assessment  Ameena Solorzano is a 19 year old female who presents with    1. Band keratopathy right eye   2. Corneal Neovascularization, both eyes  3. Congenital glaucoma, both  eyes   4. Pupillary abnormalities, both eyes   5. Peter's anomaly both eyes; Status-post pentrating keratoplasty (PKP) both eyes   6. Anisometropia and aniseikonia  7. Choroidal nevus, left eye     Plan  - I joined Dr. Salinas in a long discussion with Lyndsay's parents regarding her bilateral eye examination under anesthesia and next steps in her care. I agree with Dr. Salinas that to clear the visual axis of the right eye would require a more invasive procedure. This gives the opportunity for improving visual acuity and would allow for dilated fundus exam. He discussed the options of a keratoprosthesis versus a repeat pentrating keratoplasty (PKP) for the right eye. Family will consider next steps and follow up with Dr. Salinas as planned status-post EDTA chelation.   - Continue anti-hypertensive eye medications as before.   - See Dr. Brown as planned. I reviewed that if she does proceed with a keratoprosthesis that Dr. Kolb may be of assistance with placing a glaucoma drainage device at the time of the procedure.   - Fine to continue with present glasses. I would be happy to see Lyndsay in clinic for repeat cycloplegic refraction once the visual axis is cleared and as needed.   - Repeat dilated fundus exam and photos in 6 months to 1 year, in clinic if able, otherwise EUA (can also add to upcoming sedations at [facility]).     Romina Fam MD    Pediatric Ophthalmology & Strabismus  Department of Ophthalmology & Visual Neurosciences  [facility]      --------------------------------------------------------------------------------------------------------------------------------------------  B-scan Ultrasonography - Interpretation & Report  Indication: Choroidal nevus  Performed by: Romina Fam MD   Reliability: good  Patient cooperation: good  Findings:   Right eye:  Mild vitreous debris, retina flat, choroid & sclera normal thickness without effusions, no masses, normal orbital shadows from optic  nerve and extraocular muscles    Left eye:  vitreous with mild debris, retina flat, choroid & sclera normal thickness without effusions, no masses, normal orbital shadows from optic nerve and extraocular muscles; nevus not visualized   Interval Change, Assessment, & Impact on Treatment:   Right eye:  Initial. Monitor.    Left eye:  Initial. Monitor.    Signed: Romina Fam MD 9/8/2020 3:41 PM      --------------------------------------------------------------------------------------------------------------------------------------------    --------------------------------------------------------------------------------------------------------------------------------------------  RetCam Fundus Photography - Interpretation & Report  Indication: Congenital glasses   Performed by: Romina Fam MD   Reliability: good  Patient cooperation: good  Findings:   Right eye: Unable due to small aperture and opaque/cloudy cornea   Left eye: Consistent with clinical exam as described   Interval Change, Assessment, & Impact on Treatment:   Right eye: N/A     Left eye:  Stable    Signed: Romina Fam MD 9/8/2020 3:30 PM       ------------------------------------------------------------

## 2020-09-09 ENCOUNTER — OFFICE VISIT (OUTPATIENT)
Dept: OPHTHALMOLOGY | Facility: CLINIC | Age: 20
End: 2020-09-09
Attending: OPHTHALMOLOGY
Payer: COMMERCIAL

## 2020-09-09 DIAGNOSIS — Q13.4 PETER'S ANOMALY: Primary | ICD-10-CM

## 2020-09-09 DIAGNOSIS — Z98.890 POSTSURGICAL STATES FOLLOWING SURGERY OF EYE AND ADNEXA: ICD-10-CM

## 2020-09-09 PROCEDURE — G0463 HOSPITAL OUTPT CLINIC VISIT: HCPCS | Mod: ZF

## 2020-09-09 RX ORDER — PREDNISOLONE ACETATE 10 MG/ML
1-2 SUSPENSION/ DROPS OPHTHALMIC 4 TIMES DAILY
COMMUNITY
End: 2020-10-19

## 2020-09-09 RX ORDER — OFLOXACIN 3 MG/ML
1-2 SOLUTION/ DROPS OPHTHALMIC
COMMUNITY
End: 2020-10-19

## 2020-09-09 RX ORDER — PREDNISOLONE ACETATE 10 MG/ML
1 SUSPENSION/ DROPS OPHTHALMIC 3 TIMES DAILY
Qty: 5 ML | Refills: 0 | Status: SHIPPED | OUTPATIENT
Start: 2020-09-09 | End: 2020-10-19

## 2020-09-09 RX ORDER — KETOROLAC TROMETHAMINE 5 MG/ML
1 SOLUTION OPHTHALMIC 4 TIMES DAILY
COMMUNITY
End: 2020-09-09

## 2020-09-09 ASSESSMENT — REFRACTION_WEARINGRX
OD_CYLINDER: +6.00
OS_CYLINDER: +3.00
OD_AXIS: 120
OS_SPHERE: -6.50
SPECS_TYPE: SVL
OD_SPHERE: -12.50
OS_AXIS: 120

## 2020-09-09 ASSESSMENT — SLIT LAMP EXAM - LIDS
COMMENTS: NORMAL
COMMENTS: NORMAL

## 2020-09-09 ASSESSMENT — VISUAL ACUITY
OD_CC: 20/250
OD_PH_CC: 20/150
METHOD: SNELLEN - LINEAR
CORRECTION_TYPE: GLASSES
OS_CC: 20/60

## 2020-09-09 ASSESSMENT — TONOMETRY
OD_IOP_MMHG: 03
OS_IOP_MMHG: 14
IOP_METHOD: ICARE

## 2020-09-09 NOTE — NURSING NOTE
Chief Complaints and History of Present Illnesses   Patient presents with     Post Op (Ophthalmology) Right Eye      s/p EDTA chelation right eye 9/3/2020     Chief Complaint(s) and History of Present Illness(es)     Post Op (Ophthalmology) Right Eye     Laterality: right eye    Course: gradually improving    Associated symptoms: Negative for eye pain, tearing (yesterday) and redness (yesterday, but fine today)    Treatments tried: eye drops and ointment    Pain scale: 0/10    Comments:  s/p EDTA chelation right eye 9/3/2020              Comments     Patient returns for a 6 day post operative exam of right eye.  Yesterday her right eye felt a bit sore.  Today the eye feels normal again.   Her vision seems improved since the procedure.    Karyna Gonzalez, MARILUZ 3:06 PM  September 9, 2020

## 2020-09-09 NOTE — PROGRESS NOTES
CC: Dykes' anomaly --- here for POD1 visit s/p EDTA chelation right eye     HPI: 18 yo F w/ Hx of dykes anomaly s/p PK BE, with history of elevated eye pressures.    Interval Hx:   POD6 visit s/p EDTA chelation right eye 9/3/2020. Doing well. Mild eye discomfort yesterday but no pain today. Vision is gradually improving.        Current Ocular Meds:  Cosopt BID, OU  Alphagan BID OU  Xalatan QHS OU  FML BID OU  Refresh PM QHS PRN each eye  Artificial tears PRN each eye      A/P:    0. POD1 s/p EDTA chelation right eye 9/3/2020   - Epi defect healed   - D/C BCL   - start taper of PF TID x 1 week, then BID x 1week, then daily x 1week, then STOP   - okay to D/C ofloxacin QID OD    - RTC 10/7/20 as scheduled, sooner for any issues.   - will refer to Dr. Fam for MRx after cornea healed.     1. Dykes anomaly both eyes 2000                        - S/P bilateral penetrating keratoplasty (PK)             - band keratopathy inferiorly right eye now s/p EDTA chelation (See above), but still with remaining scarring.     2. 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 Glaucoma, each eye                         - on xalatan, alphagan, and cosopt (timolol and dorzolamide separately now) OU                        - intraocular pressure good and stable    4. Lagophthalmos, each eye             - clear corneas, pt comfortable                         - continue refresh at bedtime both eyes    RTC 10/7/2020 as scheduled, sooner prn      Riki Joseph MD  Ophthalmology PGY-3  [facility]     Attending Physician Attestation:  Complete documentation of historical and exam elements from today's encounter can be found in the full encounter summary report (not reduplicated in this progress note).  I personally obtained the chief complaint(s) and history of present illness.  I confirmed and edited  as necessary the review of systems, past medical/surgical history, family history, social history, and examination findings as documented by others; and I examined the patient myself.  I personally reviewed the relevant tests, images, and reports as documented above.  I formulated and edited as necessary the assessment and plan and discussed the findings and management plan with the patient and family. - Carter Salinas MD

## 2020-09-11 ENCOUNTER — TELEPHONE (OUTPATIENT)
Dept: OPHTHALMOLOGY | Facility: CLINIC | Age: 20
End: 2020-09-11

## 2020-09-11 NOTE — TELEPHONE ENCOUNTER
Rashad Andres ([place] GUARDIAN) [phone]    Spoke to guardian at [phone]    Redness and blurrier vision this AM in right eye  Last visit 9-9-2020  Improved this morning  No pain    Recommended restarting Preservative free artificial tears inbetween medicated eye drops    Reviewed weekend on call protocols [phone] option 4 for information to page on call provider for any worsening symptoms and/or vision changes    guardian seemed comfortable with plan    Note to cornea resident for review/amendment if applicable    Luke Hightower RN 10:53 AM 09/11/20        [facility] Call Center    Phone Message    May a detailed message be left on voicemail: yes     Reason for Call: Symptoms or Concerns     If patient has red-flag symptoms, warm transfer to triage line    Current symptom or concern: red/irritated/vision is worse since surgery    Symptoms have been present for: this morning hour(s)    Has patient previously been seen for this? No            Are there any new or worsening symptoms? Yes: Pt's father would like a call back to discuss.  Thank you.       Action Taken: Message routed to:  [facility] EYE    Travel Screening: Not Applicable

## 2020-09-23 ENCOUNTER — OFFICE VISIT (OUTPATIENT)
Dept: OPHTHALMOLOGY | Facility: CLINIC | Age: 20
End: 2020-09-23
Attending: OPHTHALMOLOGY
Payer: COMMERCIAL

## 2020-09-23 DIAGNOSIS — Z94.7 CORNEA REPLACED BY TRANSPLANT: ICD-10-CM

## 2020-09-23 DIAGNOSIS — Q13.4 PETER'S ANOMALY: Primary | ICD-10-CM

## 2020-09-23 DIAGNOSIS — Q15.0 CONGENITAL GLAUCOMA: ICD-10-CM

## 2020-09-23 DIAGNOSIS — Z98.890 POSTSURGICAL STATES FOLLOWING SURGERY OF EYE AND ADNEXA: ICD-10-CM

## 2020-09-23 PROCEDURE — G0463 HOSPITAL OUTPT CLINIC VISIT: HCPCS | Mod: ZF

## 2020-09-23 ASSESSMENT — REFRACTION_WEARINGRX
OD_AXIS: 120
OD_SPHERE: -12.50
OD_CYLINDER: +6.00
SPECS_TYPE: SVL
OS_AXIS: 120
OS_SPHERE: -6.50
OS_CYLINDER: +3.00

## 2020-09-23 ASSESSMENT — VISUAL ACUITY
OS_CC: 20/80
METHOD: SNELLEN - LINEAR
OS_CC+: +1
OD_CC: CF@2`

## 2020-09-23 ASSESSMENT — TONOMETRY
OD_IOP_MMHG: 17
OS_IOP_MMHG: 17
IOP_METHOD: TONOPEN

## 2020-09-23 ASSESSMENT — SLIT LAMP EXAM - LIDS
COMMENTS: NORMAL
COMMENTS: NORMAL

## 2020-09-23 NOTE — NURSING NOTE
Chief Complaints and History of Present Illnesses   Patient presents with     Glaucoma     Chief Complaint(s) and History of Present Illness(es)     Glaucoma     Laterality: both eyes    Quality: States va is the same since last visit      Treatment side effects: none    Compliance with Treatment: always              Comments     Peter's anomaly - Right Eye   +irritation from the EDTA   Aletha Frias COT 7:48 AM September 23, 2020

## 2020-09-23 NOTE — PROGRESS NOTES
Chief Complaint(s) and History of Present Illness(es)     Glaucoma     Laterality: both eyes    Quality: States va is the same since last visit      Treatment side effects: none    Compliance with Treatment: always              Comments     Peter's anomaly - Right Eye   +irritation from the EDTA   Aletha Frias COT 7:48 AM September 23, 2020         Current Ocular Meds:  PF daily right eye     Cosopt  BID OU both eyes twice a day   Alphagan BID, OU  Xalatan QHS, OU  FML BID, OU  Refresh PM  QHS PRN        Review of systems for the eyes was negative other than the pertinent positives/negatives listed in the HPI.      Assessment & Plan      Ameena Solorzano is a 19 year old female with the following diagnoses:   1. Peter's anomaly - Right Eye    2. Congenital glaucoma    3. Postsurgical states following surgery of eye and adnexa - Right Eye    4. 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 Torres, who feels things look a little better than the last PostOp.  Suspect refractive changes with scar remodeling and removal of bandage contact lens    Continue current drops as before  Keep PostOp with Rita in 2 weeks  Struhl to follow for refraction and further visual rehab as needed   Return precautions reviewed         Patient disposition:   Return in about 6 months (around 3/23/2021) for Glaucoma clinic for IOP check .           Marsha Quinn MD  Ophthalmology PGY-3  [facility]    Attending Physician Attestation:  Complete documentation of historical and exam elements  from today's encounter can be found in the full encounter summary report (not reduplicated in this progress note).  I personally obtained the chief complaint(s) and history of present illness.  I confirmed and edited as necessary the review of systems, past medical/surgical history, family history, social history, and examination findings as documented by others; and I examined the patient myself.  I personally reviewed the relevant tests, images, and reports as documented above.  I formulated and edited as necessary the assessment and plan and discussed the findings and management plan with the patient and family. . - Carter Brown MD

## 2020-10-07 ENCOUNTER — OFFICE VISIT (OUTPATIENT)
Dept: OPHTHALMOLOGY | Facility: CLINIC | Age: 20
End: 2020-10-07
Attending: OPHTHALMOLOGY
Payer: COMMERCIAL

## 2020-10-07 DIAGNOSIS — Z98.890 POSTSURGICAL STATES FOLLOWING SURGERY OF EYE AND ADNEXA: Primary | ICD-10-CM

## 2020-10-07 PROCEDURE — G0463 HOSPITAL OUTPT CLINIC VISIT: HCPCS

## 2020-10-07 PROCEDURE — 99024 POSTOP FOLLOW-UP VISIT: CPT | Mod: GC | Performed by: OPHTHALMOLOGY

## 2020-10-07 ASSESSMENT — REFRACTION_WEARINGRX
OS_CYLINDER: +3.00
OD_AXIS: 120
OD_SPHERE: -12.50
OS_AXIS: 120
OS_SPHERE: -6.50
SPECS_TYPE: SVL
OD_CYLINDER: +6.00

## 2020-10-07 ASSESSMENT — VISUAL ACUITY
METHOD: SNELLEN - LINEAR
OS_CC: 20/60
OD_CC: 20/600
OS_CC+: -2

## 2020-10-07 ASSESSMENT — TONOMETRY
IOP_METHOD: ICARE
OD_IOP_MMHG: 4
OS_IOP_MMHG: 11

## 2020-10-07 ASSESSMENT — SLIT LAMP EXAM - LIDS
COMMENTS: NORMAL
COMMENTS: NORMAL

## 2020-10-07 ASSESSMENT — CONF VISUAL FIELD
OS_SUPERIOR_NASAL_RESTRICTION: 1
METHOD: COUNTING FINGERS

## 2020-10-07 NOTE — NURSING NOTE
Chief Complaints and History of Present Illnesses   Patient presents with     Post Op (Ophthalmology) Right Eye     Chief Complaint(s) and History of Present Illness(es)     Post Op (Ophthalmology) Right Eye     Laterality: right eye              Comments     Lyndsay is here one month post cornea scrubbing for Peter's anomaly - Right Eye. She says RE feels good, but unsure if she sees any better. She denies pain    Thiago Johnson COT 2:57 PM October 7, 2020

## 2020-10-07 NOTE — PROGRESS NOTES
CC: Pressley' anomaly --- here for POD1 visit s/p EDTA chelation right eye     HPI: 20 yo F w/ Hx of pressley anomaly s/p PK BE, with history of elevated eye pressures.    Interval Hx:   s/p EDTA chelation right eye 9/3/2020.  Over the interval, no significant changes.  Patient denies any changes in vision, pain, redness, irritation, tearing, itchiness, photophobia, blurry vision, or trauma. No new flashes, floaters, diplopia.  Vision is stable.       Current Ocular Meds:  Cosopt BID, OU  Alphagan BID OU  Xalatan QHS OU  FML BID OU  Refresh PM QHS PRN each eye  Artificial tears PRN each eye      A/P:    0. s/p EDTA chelation right eye 9/3/2020   - Doing well. Vision grossly stable.   - Follow up with Dr. Curtis for MRx - consider chronic Kontur lens with MRx as the Kontur lens will provide a smoother surface   - If VA does not improve with MRx/Kontur, could consider surgical options, such as repeat PKP.    - Stop Prednisolone acetate   - will refer to Dr. Fam for MRx     1. Pressley anomaly both eyes 2000                        - S/P bilateral penetrating keratoplasty (PK)             - band keratopathy inferiorly right eye now s/p EDTA chelation (See above), but still with remaining scarring.     2. 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 Glaucoma, each eye                         - on xalatan, alphagan, and cosopt (timolol and dorzolamide separately now) OU                        - intraocular pressure good and stable    4. Lagophthalmos, each eye             - clear corneas, pt comfortable                         - continue refresh at bedtime both eyes    RTC: 6 months for recheck     Riki Joseph MD  Ophthalmology PGY-3  [facility]      Attending Physician Attestation:  Complete documentation of historical and exam elements from today's encounter can be found in the  full encounter summary report (not reduplicated in this progress note).  I personally obtained the chief complaint(s) and history of present illness.  I confirmed and edited as necessary the review of systems, past medical/surgical history, family history, social history, and examination findings as documented by others; and I examined the patient myself.  I personally reviewed the relevant tests, images, and reports as documented above.  I formulated and edited as necessary the assessment and plan and discussed the findings and management plan with the patient and family. - Carter Salinas MD

## 2020-10-16 ENCOUNTER — TELEPHONE (OUTPATIENT)
Dept: OPHTHALMOLOGY | Facility: CLINIC | Age: 20
End: 2020-10-16

## 2020-10-19 ENCOUNTER — OFFICE VISIT (OUTPATIENT)
Dept: OPHTHALMOLOGY | Facility: CLINIC | Age: 20
End: 2020-10-19
Attending: OPHTHALMOLOGY
Payer: COMMERCIAL

## 2020-10-19 DIAGNOSIS — Q15.0 CONGENITAL GLAUCOMA: ICD-10-CM

## 2020-10-19 DIAGNOSIS — H21.563 PUPIL IRREGULAR OF BOTH EYES: ICD-10-CM

## 2020-10-19 DIAGNOSIS — H52.32 ANISOMETROPIA AND ANISEIKONIA: ICD-10-CM

## 2020-10-19 DIAGNOSIS — H55.01 NYSTAGMUS, CONGENITAL: ICD-10-CM

## 2020-10-19 DIAGNOSIS — H02.20C LAGOPHTHALMOS OF BOTH UPPER AND LOWER EYELIDS OF BOTH EYES, UNSPECIFIED LAGOPHTHALMOS TYPE: ICD-10-CM

## 2020-10-19 DIAGNOSIS — H53.143 PHOTOPHOBIA OF BOTH EYES: ICD-10-CM

## 2020-10-19 DIAGNOSIS — Q13.4 PETER'S ANOMALY: Primary | ICD-10-CM

## 2020-10-19 DIAGNOSIS — H54.3 LOW VISION, BOTH EYES: ICD-10-CM

## 2020-10-19 DIAGNOSIS — Z94.7 CORNEA REPLACED BY TRANSPLANT: ICD-10-CM

## 2020-10-19 DIAGNOSIS — H50.111 EXOTROPIA OF RIGHT EYE: ICD-10-CM

## 2020-10-19 DIAGNOSIS — Q93.59: ICD-10-CM

## 2020-10-19 DIAGNOSIS — H52.31 ANISOMETROPIA AND ANISEIKONIA: ICD-10-CM

## 2020-10-19 PROCEDURE — 92015 DETERMINE REFRACTIVE STATE: CPT | Performed by: TECHNICIAN/TECHNOLOGIST

## 2020-10-19 PROCEDURE — 99024 POSTOP FOLLOW-UP VISIT: CPT | Mod: GC | Performed by: OPHTHALMOLOGY

## 2020-10-19 PROCEDURE — G0463 HOSPITAL OUTPT CLINIC VISIT: HCPCS | Performed by: TECHNICIAN/TECHNOLOGIST

## 2020-10-19 ASSESSMENT — EXTERNAL EXAM - LEFT EYE: OS_EXAM: PROTOSIS R>L, FAIR LID CLOSURE

## 2020-10-19 ASSESSMENT — REFRACTION_MANIFEST
OD_CYLINDER: SPHERE
OS_AXIS: 115
OS_SPHERE: -6.50
OD_SPHERE: -4.50
OS_CYLINDER: +3.50

## 2020-10-19 ASSESSMENT — EXTERNAL EXAM - RIGHT EYE: OD_EXAM: PROTOSIS R>L, FAIR LID CLOSURE

## 2020-10-19 ASSESSMENT — REFRACTION_WEARINGRX
OS_SPHERE: -6.50
OD_CYLINDER: +6.00
OS_CYLINDER: +3.00
OD_SPHERE: -12.50
OS_AXIS: 120
SPECS_TYPE: SVL
OD_AXIS: 120

## 2020-10-19 ASSESSMENT — TONOMETRY
OS_IOP_MMHG: 8
IOP_METHOD: ICARE M/T
OD_IOP_MMHG: 5

## 2020-10-19 ASSESSMENT — VISUAL ACUITY
OS_CC+: -2
METHOD: SNELLEN - LINEAR
OS_CC: 20/60
OD_CC: 20/500

## 2020-10-19 ASSESSMENT — SLIT LAMP EXAM - LIDS: COMMENTS: EXOPHTHALMOS R>L

## 2020-10-19 NOTE — NURSING NOTE
Chief Complaint(s) and History of Present Illness(es)     Post Op (Ophthalmology) Right Eye     Laterality: right eye    Associated symptoms: Negative for eye pain, redness and tearing    Response to treatment: moderate improvement              Comments     S/p EDTA on 9/3/2020, pt feels that they have healed well, they have no pain or irritation at this time.  Pt would like new MR today to see if their is an Rx to help them to see better. Pt is taking Latanoprost at bedtime, Aplaphagan bid, cosopt bid and FML bid.

## 2020-10-19 NOTE — PATIENT INSTRUCTIONS
10/19/20    For BOTH EYES:   1. Cosopt twice a day   2. Alphagan twice a day   3. Xalatan at bedtime  4. Fluorometholone twice a day   5. Refresh PM at bedtime   6. Preservative free artificial tears as needed     Schedule with Dr. Garcia for contact lens evaluation.     Continue to monitor mAeena's visual function and eye alignment until your next visit with us.  If vision or eye alignment appear to be worsening or if you have any new concerns, please contact our office.  A sooner assessment by Dr. Fam or our orthoptic team may be necessary.

## 2020-10-19 NOTE — LETTER
10/19/2020    To: Cailin Maxwell MD  [facility]  [address]  [facility] [address]    Re:  Ameena Solorzano    YOB: 2000    MRN: [id number]    Dear Colleague,     It was my pleasure to see Ameena on 10/19/2020.  In summary, Ameena Solorzano is a 19 year old female who presents with:     Congenital glaucoma  Low intraocular pressure by [employer] today.  - Continue present management: cosopt twice a day, alphagan twice a day, xalatan at bedtime. All both eyes.     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 Reduced to 20/500 right eye.   - Agree with contact lens trial. Scleral vs kontour lens. Referred to Dr. Garcia.  - Continue present management per Dr. Salinas with fluorometholone twice a day   - May need repeat PKP in the future.    Anisometropia and aniseikonia  Irregular astigmatism of both eyes  Low vision, both eyes   Best corrected visual acuity in 2019 ranged from 20/125-20/200 right eye.   Right eye visual acuity is 20/500  The left eye visual acuity is stable at 20/60-  - Plan for updating glasses over contact lens right eye. No lens improved visual acuity today.     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    Exotropia of right eye  Stable. Monitor.    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 Greatly limits posterior views.     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      Thank you for the opportunity to care for Ameena. I have asked her to Return in about 6 months (around 4/19/2021) for Vision & alignment, CRx & Dilated Exam, next available w/Dr. Garcia.  Until then, please do not hesitate to contact me or my clinic  with any questions or concerns.          Warm regards,          Romina Fam MD                 Pediatric Ophthalmology & Strabismus        Department of Ophthalmology & Visual Neurosciences        [facility]   CC:  MD Carter Yañez MD Maggie E. Allen

## 2020-10-19 NOTE — PROGRESS NOTES
Chief Complaint(s) and History of Present Illness(es)     Post Op (Ophthalmology) Right Eye     In right eye.  Associated symptoms include Negative for eye pain, redness and tearing.  Response to treatment was moderate improvement.              Comments     S/p EDTA on 9/3/2020, pt feels that they have healed well, they have no pain or irritation at this time.  Pt would like new MR today to see if their is an Rx to help them to see better. Pt is taking Latanoprost at bedtime, Aplaphagan bid, cosopt bid and FML bid.              Review of systems for the eyes was negative other than the pertinent positives and negatives noted in the HPI.  History is obtained from the patient and father.     Primary care: Cailin Maxwell   Referring provider: Cailin Maxwell  [facility] is home  Assessment & Plan   Ameena Solorzano is a 19 year old female who presents with:     Congenital glaucoma  Low intraocular pressure by [facility] today.  - Continue present management: cosopt twice a day, alphagan twice a day, xalatan at bedtime. All both eyes.     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 Reduced to 20/500 right eye.   - Agree with contact lens trial. Scleral vs kontour lens. Referred to Dr. Garcia.  - Continue present management per Dr. Salinas with fluorometholone twice a day   - May need repeat PKP in the future.    Anisometropia and aniseikonia  Irregular astigmatism of both eyes  Low vision, both eyes   Best corrected visual acuity in 2019 ranged from 20/125-20/200 right eye.   Right eye visual acuity is 20/500  The left eye visual acuity is stable at 20/60-  - Plan for updating glasses over contact lens right eye. No lens improved visual acuity today.     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    Exotropia of right eye  Stable. Monitor.    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 Greatly limits posterior views.     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        Return in about 6 months (around 4/19/2021) for Vision & alignment, CRx & Dilated Exam, next available w/Dr. Garcia.     Patient Instructions   10/19/20    For BOTH EYES:   1. Cosopt twice a day   2. Alphagan twice a day   3. Xalatan at bedtime  4. Fluorometholone twice a day   5. Refresh PM at bedtime   6. Preservative free artificial tears as needed     Schedule with Dr. Garcia for contact lens evaluation.     Continue to monitor Nancys visual function and eye alignment until your next visit with us.  If vision or eye alignment appear to be worsening or if you have any new concerns, please contact our office.  A sooner assessment by Dr. Fam or our orthoptic team may be necessary.          Visit Diagnoses & Orders    ICD-10-CM    1. Peter's anomaly - Right Eye  Q13.89    2. Congenital glaucoma  Q15.0    3. Cornea replaced by transplant - Both Eyes  Z94.7    4. Anisometropia and aniseikonia  H52.31    5. Low vision, both eyes  H54.3    6. Nystagmus, congenital  H55.01    7. Photophobia of both eyes  H53.143    8. Exotropia of right eye  H50.10    9. Pupil irregular of both eyes  H21.563    10. 6p partial monosomy syndrome  Q93.59    11. 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  I personally obtained the chief complaint(s) and history of present illness.  I confirmed and edited as necessary the review of systems, past  medical/surgical history, family history, social history, and examination findings as documented by others; and I examined the patient myself.  I personally reviewed the relevant tests, images, and reports as documented above.  I formulated and edited as necessary the assessment and plan and discussed the findings and management plan with the patient and family. - Romina Fam MD

## 2020-10-22 ENCOUNTER — TELEPHONE (OUTPATIENT)
Dept: OPHTHALMOLOGY | Facility: CLINIC | Age: 20
End: 2020-10-22

## 2020-10-23 ENCOUNTER — TELEPHONE (OUTPATIENT)
Dept: OPHTHALMOLOGY | Facility: CLINIC | Age: 20
End: 2020-10-23

## 2020-10-28 ENCOUNTER — TELEPHONE (OUTPATIENT)
Dept: OPHTHALMOLOGY | Facility: CLINIC | Age: 20
End: 2020-10-28

## 2020-11-11 ENCOUNTER — OFFICE VISIT (OUTPATIENT)
Dept: OPTOMETRY | Facility: CLINIC | Age: 20
End: 2020-11-11
Payer: COMMERCIAL

## 2020-11-11 DIAGNOSIS — Z94.7 POST CORNEAL TRANSPLANT: ICD-10-CM

## 2020-11-11 DIAGNOSIS — Q13.4 PETERS ANOMALY: Primary | ICD-10-CM

## 2020-11-11 RX ORDER — OFLOXACIN 3 MG/ML
1-2 SOLUTION/ DROPS OPHTHALMIC 2 TIMES DAILY
Qty: 1 BOTTLE | Refills: 11 | Status: SHIPPED | OUTPATIENT
Start: 2020-11-11 | End: 2021-04-19

## 2020-11-11 ASSESSMENT — REFRACTION_CURRENTRX
OD_SPHERE: -2.00
OD_BRAND: AIR OPTIX N&D
OD_DIAMETER: 16.0
OD_SPHERE: -0.50
OD_BRAND: ZENLENS OBLATE
OD_BASECURVE: 8.6
OD_DIAMETER: 13.8

## 2020-11-11 ASSESSMENT — EXTERNAL EXAM - RIGHT EYE: OD_EXAM: PROTOSIS R>L, FAIR LID CLOSURE

## 2020-11-11 ASSESSMENT — SLIT LAMP EXAM - LIDS: COMMENTS: EXOPHTHALMOS R>L

## 2020-11-11 ASSESSMENT — VISUAL ACUITY
METHOD: SNELLEN - LINEAR
CORRECTION_TYPE: GLASSES
OD_CC: 20/600
OS_CC: 20/60-2

## 2020-11-11 ASSESSMENT — EXTERNAL EXAM - LEFT EYE: OS_EXAM: PROTOSIS R>L, FAIR LID CLOSURE

## 2020-11-12 NOTE — PROGRESS NOTES
A/P  1.) Pressley Anomaly with PKP right eye  -s/p EDTA chelation right eye this summer  -Noticed improved vision right eye with BCL previously  -Failed RGP's (microcornea, comfort)  -Today improves from 20/600 to 20/500 with BCL, good comfort  -Improves to 20/200 with scleral lens. Fit somewhat challenging but with microvault I think we have a reasonable chance of good success  -Reviewed with pt and father. Will start with BCL for a while to see how she responds. She is somewhat hesitant to do rigid/scleral lens with daily I&R  -Start extended wear BCL with daily prophylactic ofloxacin. Change at least monthly. Dad did I&R in office    F/u 3-4 months to discuss scleral lens again. If they decide to pursue scleral lens prior to that they can MyChart me to order and lens and f/u 2 weeks after

## 2020-11-16 ENCOUNTER — HEALTH MAINTENANCE LETTER (OUTPATIENT)
Age: 20
End: 2020-11-16

## 2021-01-20 DIAGNOSIS — Q13.4 PETER'S ANOMALY: ICD-10-CM

## 2021-01-20 NOTE — TELEPHONE ENCOUNTER
brimonidine 0.1 % eye drops (ALPHAGAN P)     Requested directions:  Place 1 drop into both eyes 2 times daily - Both Eyes  Current directions on the medication list:   Place 1 drop into both eyes 2 times daily - Both Eyes    Last Written Prescription Date:  7/15/2020  Last Fill Quantity: 15,   # refills: 3    Last Office Visit:  10/19/2020  Future Office visit:  10/19/2020     Attending Provider:  Romina Fam MD  Ophthalmology  Last Clinic Note: 10/19/2020    Patient Instructions   10/19/20     For BOTH EYES:   1. Cosopt twice a day   2. Alphagan twice a day   3. Xalatan at bedtime  4. Fluorometholone twice a day   5. Refresh PM at bedtime   6. Preservative free artificial tears as needed      Schedule with Dr. Garcia for contact lens evaluation.     Routing refill request to provider for review/approval because:  Refer to [facility], We do not refill medications for [facility] eye clinic.     Thank you       Mai Law RN  Central Triage Red Flags/Med Refills

## 2021-01-26 RX ORDER — BRIMONIDINE TARTRATE 1 MG/ML
1 SOLUTION/ DROPS OPHTHALMIC 2 TIMES DAILY
Qty: 15 ML | Refills: 5 | Status: SHIPPED | OUTPATIENT
Start: 2021-01-26 | End: 2022-02-15

## 2021-01-26 RX ORDER — BRIMONIDINE TARTRATE 1 MG/ML
1 SOLUTION/ DROPS OPHTHALMIC 2 TIMES DAILY
Qty: 15 ML | OUTPATIENT
Start: 2021-01-26

## 2021-01-28 ENCOUNTER — OFFICE VISIT (OUTPATIENT)
Dept: OPHTHALMOLOGY | Facility: CLINIC | Age: 21
End: 2021-01-28
Payer: COMMERCIAL

## 2021-01-28 DIAGNOSIS — H52.31 ANISOMETROPIA AND ANISEIKONIA: ICD-10-CM

## 2021-01-28 DIAGNOSIS — H04.129 DRY EYE: ICD-10-CM

## 2021-01-28 DIAGNOSIS — H54.3 LOW VISION, BOTH EYES: ICD-10-CM

## 2021-01-28 DIAGNOSIS — H55.01 NYSTAGMUS, CONGENITAL: ICD-10-CM

## 2021-01-28 DIAGNOSIS — Q13.4 PETER'S ANOMALY: ICD-10-CM

## 2021-01-28 DIAGNOSIS — Z94.7 CORNEA REPLACED BY TRANSPLANT: Primary | ICD-10-CM

## 2021-01-28 DIAGNOSIS — H52.32 ANISOMETROPIA AND ANISEIKONIA: ICD-10-CM

## 2021-01-28 PROCEDURE — 99213 OFFICE O/P EST LOW 20 MIN: CPT | Performed by: OPTOMETRIST

## 2021-01-28 ASSESSMENT — REFRACTION_WEARINGRX
OS_SPHERE: -6.50
OD_CYLINDER: +6.00
OS_CYLINDER: +3.00
OD_SPHERE: -12.50
OD_AXIS: 120
SPECS_TYPE: SVL
OS_AXIS: 120

## 2021-01-28 ASSESSMENT — REFRACTION_CURRENTRX
OD_BASECURVE: 8.4
OD_BRAND: VS SCLERAL
OD_SPHERE: PLANO
OD_DIAMETER: 16.0

## 2021-01-28 ASSESSMENT — VISUAL ACUITY
METHOD: SNELLEN - LINEAR
OD_CC: 3'/200
CORRECTION_TYPE: GLASSES
OS_CC: 20/60
OS_CC+: -2

## 2021-01-28 NOTE — TELEPHONE ENCOUNTER
FUTURE VISIT INFORMATION      FUTURE VISIT INFORMATION:    Date: 2.11.21    Time: 1:00 PM    Location: [facility]  REFERRAL INFORMATION:    Referring provider:  Dr Selma Torres     Referring providers clinic:  [facility]     Reason for visit/diagnosis: scleral lens I&R right eye - both parents will be with    RECORDS REQUESTED FROM:       Clinic name Comments Records Status Imaging Status   [facility] 1.28.21 Dr Loretta torres  11.11.20  10.7.20 Dr Carter Salinas  9.23.20   9.9.20  [facility]    [facility] 10.19.20 Dr Romina Fam  8.14.20  [facility]    [facility] 9.3.20 Dr Carter Salinas - Procedure, [facility] Ad[facility] Internal

## 2021-01-28 NOTE — NURSING NOTE
Chief Complaints and History of Present Illnesses   Patient presents with     Contact Lens Follow Up     Chief Complaint(s) and History of Present Illness(es)     Contact Lens Follow Up     Laterality: right eye    Course: stable    Pain scale: 0/10              Comments     Here to discuss (mom & dad) scleral lens. Unsure if soft lens is still in the right lens- appears it is lost? No pain.    Latanoprost at bedtime, Aplaphagan bid, cosopt bid and FML bid.    Used ofloxacin when right lens is in    Mervin Saba, COT COT 2:29 PM January 28, 2021

## 2021-01-28 NOTE — PATIENT INSTRUCTIONS
What is a scleral lens?  A scleral lens is a large diameter rigid gas permeable contact lens that vaults completely over the cornea (clear tissue on the front of the eye) and touches only on the white part of the eye (sclera). A liquid layer between the lens and the cornea lubricates the ocular surface and protects from physical rubbing/irritation on the sensitive cornea. These lenses are helpful for a variety of conditions including dry eyes and irregularly shaped corneas.    How are they different than small RGP lenses?  Scleral lenses do not move much in the eye and do not touch the sensitive cornea, so they tend to be much easier to get used to. Insertion is quite different, but removal is similar. To insert them properly they need to be filled with preservative free saline    How do you insert and remove them?  See this website for video demonstration: [email or web address]-patients/[email or web address]-videos/   We will start you out with all the solutions you need at first, but you will need to buy more if you continue to wear lenses.    How long do they last?  Once the final fit is obtained (the best lens shape for your eye may require several revisions), lenses can last from 1-2 years depending on your eyes.    Does insurance cover them?  Sometimes - this is very dependent on your insurance and which diagnoses they are for. It is usually best if you check directly with your insurance, however we are able to help you with this: call [phone]. We always submit medically necessary lenses to insurance but if your insurance does not cover them you may be responsible for them.     What happens if I lose or break a lens?  In the first 90 days after initial order you are covered under the manufacturing lab's warranty. Please just call the clinic to let us know. After 90 days we typically need to order a new lens, which will carry a new charge. Custom lenses take 1-2 weeks to arrive, so if you cannot function well  without a lens it may be worth getting a backup.

## 2021-01-29 ASSESSMENT — EXTERNAL EXAM - RIGHT EYE: OD_EXAM: PROTOSIS R>L, FAIR LID CLOSURE

## 2021-01-29 ASSESSMENT — SLIT LAMP EXAM - LIDS: COMMENTS: EXOPHTHALMOS R>L

## 2021-01-29 ASSESSMENT — EXTERNAL EXAM - LEFT EYE: OS_EXAM: PROTOSIS R>L, FAIR LID CLOSURE

## 2021-01-29 NOTE — PROGRESS NOTES
A/P  1.) Pressley Anomaly with PKP right eye  -s/p EDTA chelation right eye this past summer  -Noticed improved vision right eye with BCL previously  -Failed RGP's (microcornea, comfort)  -Dad has been trialing BCL use for past month - difficult to tell if it's in or not. Lens was OUT on today's exam  -If continuing in soft BCL would consider Air Optix Brown plano for easier viewing if lens present or not. Good pt comfort when in but limited vision improvement (today not even 20/600). Continue ofloxacin every day while using soft BCL  -Continues to have improved vision and overall good comfort with scleral lens trial. Difficult OR but seems largely stable around 20/300 today. She likes the comfort of this better than no lens and better than BCL today. Would still use caution with superior cystic bleb    Reviewed findings with pt/mom/dad. They would like to pursue scleral lens right eye, with a backup option of tinted BCL if this does not work. Lyndsay tolerated scleral lens insertion beautifully today - reviewed I&R with mom and dad including need for daily removal and cleaning and they think it is doable.     Order scleral lens. RTC 2 weeks for I&R training and lens eval. Reviewed f/u schedule with CL fits. I did give glasses Rx for use with scleral lens (right lens will need adjusting) and they prefer to fill prior to starting in scleral lens

## 2021-02-11 ENCOUNTER — PRE VISIT (OUTPATIENT)
Dept: OPHTHALMOLOGY | Facility: CLINIC | Age: 21
End: 2021-02-11

## 2021-02-25 ENCOUNTER — OFFICE VISIT (OUTPATIENT)
Dept: OPTOMETRY | Facility: CLINIC | Age: 21
End: 2021-02-25
Payer: COMMERCIAL

## 2021-02-25 ENCOUNTER — OFFICE VISIT (OUTPATIENT)
Dept: OPHTHALMOLOGY | Facility: CLINIC | Age: 21
End: 2021-02-25
Payer: COMMERCIAL

## 2021-02-25 DIAGNOSIS — Z94.7 POST CORNEAL TRANSPLANT: ICD-10-CM

## 2021-02-25 DIAGNOSIS — H52.32 ANISOMETROPIA AND ANISEIKONIA: ICD-10-CM

## 2021-02-25 DIAGNOSIS — H52.31 ANISOMETROPIA AND ANISEIKONIA: ICD-10-CM

## 2021-02-25 DIAGNOSIS — H04.129 DRY EYE: ICD-10-CM

## 2021-02-25 DIAGNOSIS — Z94.7 CORNEA REPLACED BY TRANSPLANT: ICD-10-CM

## 2021-02-25 DIAGNOSIS — Q13.4 PETER'S ANOMALY: Primary | ICD-10-CM

## 2021-02-25 DIAGNOSIS — H52.211 IRREGULAR ASTIGMATISM OF RIGHT EYE: ICD-10-CM

## 2021-02-25 DIAGNOSIS — Q13.4 PETERS ANOMALY: Primary | ICD-10-CM

## 2021-02-25 PROCEDURE — 99213 OFFICE O/P EST LOW 20 MIN: CPT | Performed by: OPTOMETRIST

## 2021-02-25 RX ORDER — SODIUM CHLORIDE FOR INHALATION 0.9 %
5 VIAL, NEBULIZER (ML) INHALATION 2 TIMES DAILY
Qty: 500 ML | Refills: 11 | Status: SHIPPED | OUTPATIENT
Start: 2021-02-25 | End: 2021-03-26

## 2021-02-25 ASSESSMENT — VISUAL ACUITY
CORRECTION_TYPE: CONTACTS
OD_CC: 20/400
OD_CC: 20/300
OS_CC: 20/60-2
METHOD: SNELLEN - LINEAR

## 2021-02-25 ASSESSMENT — EXTERNAL EXAM - RIGHT EYE: OD_EXAM: PROTOSIS R>L, FAIR LID CLOSURE

## 2021-02-25 ASSESSMENT — SLIT LAMP EXAM - LIDS: COMMENTS: EXOPHTHALMOS R>L

## 2021-02-25 ASSESSMENT — REFRACTION_WEARINGRX
OD_SPHERE: -3.50
OS_AXIS: 120
OS_CYLINDER: +3.00
OS_SPHERE: -6.50
OD_CYLINDER: SPHERE

## 2021-02-25 ASSESSMENT — REFRACTION_CURRENTRX
OD_BRAND: ZENLENS OBLATE
OD_SPHERE: +6.00
OD_ADDL_SPECS: BOSTON XO2 CLEAR
OD_DIAMETER: 16.0

## 2021-02-25 ASSESSMENT — EXTERNAL EXAM - LEFT EYE: OS_EXAM: PROTOSIS R>L, FAIR LID CLOSURE

## 2021-02-25 NOTE — NURSING NOTE
Chief Complaints and History of Present Illnesses   Patient presents with     Contact Lens Follow Up     I and R right eye scleral lens.     Chief Complaint(s) and History of Present Illness(es)     Contact Lens Follow Up     Laterality: right eye    Comments: I and R right eye scleral lens.              Comments     Each parent was able to insert contact in right eye with patient. Dad removed.  Patient was able to tolerate and checked vision.     .Maribeth Juarez, COT COT 1:32 PM February 25, 2021

## 2021-02-26 ASSESSMENT — REFRACTION_CURRENTRX
OD_SPHERE: PLANO
OD_BRAND: AIR OPTIX COLORS
OD_BASECURVE: 8.6
OD_DIAMETER: 14.2
OD_ADDL_SPECS: BOSTON XO2 CLEAR
OD_DIAMETER: 16.0
OD_SPHERE: +6.00
OD_BRAND: ZENLENS OBLATE

## 2021-02-26 NOTE — PROGRESS NOTES
No office. CL order only. Billing for lens dispensed at today's [facility] visit.    Contact Lens Billing  V-Code:  - GP scleral  Final Contact Lens Rx       Brand Base Curve Diameter Sphere Lens Addl. Specs    Right Zenlens Oblate 9.0bc, 4.2 sag 16.0 +6.00 std H/2 flat V, Microvault axis 90, dec 7.5mm, depth 250, width 8mm [place] XO2 clear    Left               # of units: 1  Price per Unit: $250    This patient requires contact lenses that are medically necessary for either improvement in vision over spectacles, support of the ocular surface, or other therapeutic benefit. These are not cosmetic contact lenses.     Encounter Diagnoses   Name Primary?     Pressley anomaly Yes     Post corneal transplant      Irregular astigmatism of right eye         Date of last eye exam: Today [facility]

## 2021-02-26 NOTE — PROGRESS NOTES
A/P  1.) Pressley Anomaly with PKP right eye  -s/p EDTA chelation right eye this past summer  -Noticed improved vision right eye with BCL previously  -Failed RGP's (microcornea, comfort)  -They like Air Optix Colors BCL (easier to tell when it is in correctly), doing well with this as backup option  -Scleral lens dispense, I&R with mom and dad today. Successful I&R, reviewed CL care and hygiene ([place] [employer], [employer] [facility])  -BCVA today with scleral lens (and Rx glasses over, which they have not gotten yet) 20/100 much improved  -Good initial comfort/fit, good start with superior microvault    Dispensed lens today. Reviewed adaptation and likely need for mild fit adjustments. RTC 3 weeks f/u wearing lens for several hours. They have soft BCL as backup if needed    I have confirmed the patient's CC, HPI and reviewed Past Medical History, Past Surgical History, Social History, Family History, Problem List, Medication List and agree with Tech note.     Selma Garcia, CORNELIUS MARINELLIO JUAN DANIELS

## 2021-03-18 ENCOUNTER — OFFICE VISIT (OUTPATIENT)
Dept: OPHTHALMOLOGY | Facility: CLINIC | Age: 21
End: 2021-03-18
Payer: COMMERCIAL

## 2021-03-18 DIAGNOSIS — Q13.4 PETER'S ANOMALY: ICD-10-CM

## 2021-03-18 DIAGNOSIS — Z94.7 CORNEA REPLACED BY TRANSPLANT: Primary | ICD-10-CM

## 2021-03-18 DIAGNOSIS — H04.129 DRY EYE: ICD-10-CM

## 2021-03-18 DIAGNOSIS — H55.01 NYSTAGMUS, CONGENITAL: ICD-10-CM

## 2021-03-18 PROCEDURE — 99214 OFFICE O/P EST MOD 30 MIN: CPT | Performed by: OPTOMETRIST

## 2021-03-18 RX ORDER — PREDNISOLONE ACETATE 10 MG/ML
1-2 SUSPENSION/ DROPS OPHTHALMIC 4 TIMES DAILY
Qty: 5 ML | Refills: 0 | Status: SHIPPED | OUTPATIENT
Start: 2021-03-18 | End: 2021-04-19

## 2021-03-18 ASSESSMENT — VISUAL ACUITY
OS_CC: 20/60
OS_CC+: -2
OD_CC: CF @ 3'
CORRECTION_TYPE: CONTACTS
METHOD: SNELLEN - LINEAR
OD_PH_CC: 20/400

## 2021-03-18 ASSESSMENT — TONOMETRY
IOP_METHOD: ICARE
OD_IOP_MMHG: 17

## 2021-03-18 ASSESSMENT — EXTERNAL EXAM - RIGHT EYE: OD_EXAM: PROTOSIS R>L, FAIR LID CLOSURE

## 2021-03-18 ASSESSMENT — PACHYMETRY: OD_CT(UM): 729

## 2021-03-18 ASSESSMENT — SLIT LAMP EXAM - LIDS: COMMENTS: EXOPHTHALMOS R>L

## 2021-03-18 ASSESSMENT — REFRACTION_WEARINGRX
OS_AXIS: 120
OD_CYLINDER: SPHERE
OS_CYLINDER: +3.00
OD_SPHERE: -3.50
OS_SPHERE: -6.50

## 2021-03-18 ASSESSMENT — REFRACTION_CURRENTRX
OD_BRAND: ZENLENS OBLATE
OD_SPHERE: +6.00
OD_ADDL_SPECS: BOSTON XO2 CLEAR
OD_DIAMETER: 16.0

## 2021-03-18 ASSESSMENT — EXTERNAL EXAM - LEFT EYE: OS_EXAM: PROTOSIS R>L, FAIR LID CLOSURE

## 2021-03-18 NOTE — NURSING NOTE
Chief Complaints and History of Present Illnesses   Patient presents with     Contact Lens Follow Up     Chief Complaint(s) and History of Present Illness(es)     Contact Lens Follow Up     Laterality: right eye    Onset: months ago    Frequency: constantly    Course: stable    Associated symptoms: Negative for eye pain    Pain scale: 0/10              Comments     Follow up for scleral lens, Pt has been wearing in daily. Pt states eye feels a lot better with scleral in. AT PRN each eye,    MARILUZ Moore COT 11:14 AM March 18, 2021

## 2021-03-18 NOTE — PROGRESS NOTES
A/P  1.) Pressley Anomaly with PKP right eye  -s/p EDTA chelation right eye this past summer  -Noticed improved vision right eye with BCL previously  -Failed RGP's (microcornea, comfort)  -Previously in Air Optix Colors BCL (easier to tell when it is in correctly)  -Fit in scleral lens 3 weeks ago, today with excellent fit and good comfort. I&R going well with mom and dad  -Substantial decrease in vision today (20/150 last exam to CF today). Unsure exact cause but suspect early K edema with scleral lens wear. Does improve back to 20/200- with +6.00 OR today (glasses and CL's are correct power, verified)  -No previous pachy (difficult to measure with nystagmus) but did get a reading today. IOP also variable d/t nystagmus but stable to 09/2020  -Difficult slit lamp exam - no extreme edema but possibly worsened from previous    Scleral lens fit and comfort excellent. Watch decline in vision closely. Start prednisolone qid right eye x 1 week, f/u with Dr. Salinas next Friday. I will be there same day for consult if needed.     I have confirmed the patient's CC, HPI and reviewed Past Medical History, Past Surgical History, Social History, Family History, Problem List, Medication List and agree with Tech note.     Selma Garcia, CORNELIUS FRANCESS

## 2021-03-26 ENCOUNTER — OFFICE VISIT (OUTPATIENT)
Dept: OPHTHALMOLOGY | Facility: CLINIC | Age: 21
End: 2021-03-26
Attending: OPTOMETRIST
Payer: COMMERCIAL

## 2021-03-26 ENCOUNTER — DOCUMENTATION ONLY (OUTPATIENT)
Dept: OPTOMETRY | Facility: CLINIC | Age: 21
End: 2021-03-26

## 2021-03-26 DIAGNOSIS — Z94.7 CORNEA REPLACED BY TRANSPLANT: Primary | ICD-10-CM

## 2021-03-26 DIAGNOSIS — H54.3 LOW VISION, BOTH EYES: ICD-10-CM

## 2021-03-26 DIAGNOSIS — H21.563 PUPIL IRREGULAR OF BOTH EYES: ICD-10-CM

## 2021-03-26 DIAGNOSIS — H55.01 NYSTAGMUS, CONGENITAL: ICD-10-CM

## 2021-03-26 DIAGNOSIS — Q15.0 CONGENITAL GLAUCOMA: ICD-10-CM

## 2021-03-26 DIAGNOSIS — Q13.4 PETER'S ANOMALY: ICD-10-CM

## 2021-03-26 PROCEDURE — G0463 HOSPITAL OUTPT CLINIC VISIT: HCPCS

## 2021-03-26 PROCEDURE — 99214 OFFICE O/P EST MOD 30 MIN: CPT | Mod: GC | Performed by: OPHTHALMOLOGY

## 2021-03-26 PROCEDURE — 76514 ECHO EXAM OF EYE THICKNESS: CPT | Performed by: OPHTHALMOLOGY

## 2021-03-26 ASSESSMENT — REFRACTION_WEARINGRX
OD_CYLINDER: SPHERE
OS_AXIS: 120
OS_CYLINDER: +3.00
OS_SPHERE: -6.50
OD_SPHERE: -3.50

## 2021-03-26 ASSESSMENT — VISUAL ACUITY
OD_CC: 20/500
OS_CC: 20/80
METHOD: SNELLEN - LINEAR
CORRECTION_TYPE: GLASSES

## 2021-03-26 ASSESSMENT — PACHYMETRY: OD_CT(UM): 657

## 2021-03-26 ASSESSMENT — TONOMETRY
IOP_METHOD: ICARE
OD_IOP_MMHG: CTL
OS_IOP_MMHG: 09

## 2021-03-26 ASSESSMENT — SLIT LAMP EXAM - LIDS: COMMENTS: EXOPHTHALMOS R>L

## 2021-03-26 ASSESSMENT — EXTERNAL EXAM - LEFT EYE: OS_EXAM: PROTOSIS R>L, FAIR LID CLOSURE

## 2021-03-26 ASSESSMENT — EXTERNAL EXAM - RIGHT EYE: OD_EXAM: PROTOSIS R>L, FAIR LID CLOSURE

## 2021-03-26 NOTE — PROGRESS NOTES
VA 20/400 today (question reliability) with lens reinserted properly.    Improved from previous, but question if the 20/150 BCVA reading was real from previous. Would monitor    Seeing Strul in April, they can recheck power of scleral lens/glasses combo and update me if we need to change lens power.     Lyndsay still reports better vision with it in and excellent comfort vs no lens.    F/u with me in July/August

## 2021-03-26 NOTE — NURSING NOTE
Chief Complaints and History of Present Illnesses   Patient presents with     Follow Up     1 week follow up  Pressley Anomaly with PKP right eye     Chief Complaint(s) and History of Present Illness(es)     Follow Up     Comments: 1 week follow up  Pressley Anomaly with PKP right eye              Comments     Pt states vision is better than last week. No eye pain today.  No flashes or floaters.    HALIMA Edwards March 26, 2021 8:34 AM

## 2021-03-26 NOTE — PROGRESS NOTES
CC: Pressley' anomaly --- here for POD1 visit s/p EDTA chelation right eye     HPI: 20 yo F w/ Hx of pressley anomaly s/p PK BE, with history of elevated eye pressures.    Interval Hx:   s/p EDTA chelation right eye 9/3/2020.  Over the interval, working with Dr. Garcia on scleral lens fittings.. At visit 3/18/21, Dr. Garcia noted decrease in vision from 20/150 to CF, suspected early K edema -- improved to 20/200- w/ +6.00 OR that day.  Started PF QID, still using.  Per Dr. ADAN, SCL fit excellent.       Current Ocular Meds:  PF QID OD  Cosopt BID, OU  Alphagan BID OU  Xalatan QHS OU  FML BID OU  Refresh PM QHS PRN each eye  Artificial tears PRN each eye      A/P:    0. s/p EDTA chelation right eye 9/3/20    1. Pressley anomaly both eyes 2000                        - S/P bilateral penetrating keratoplasty (PK)             - band keratopathy inferiorly right eye now s/p EDTA chelation (See above), but still with remaining scarring.    - follows with Dr. Curtis, Dr. Garcia, glaucoma service.     - working with Dr. Garcia on scleral lens fittings.. At visit 3/18/21, Dr. Garcia noted decrease in vision from 20/150 to CF, suspected early K edema -- improved to 20/200- w/ +6.00 OR that day.  Started PF QID, still using.  Per Dr. ADAN, SCL fit excellent.    - today 3/26 vision 20/500, pachy 657 - air bubble in lens    - improved K edema on prednisolone QID OD x 1 week, then TID x 1week, then BID x 1 week, then daily x 1 week, then STOP    - continue current gtts    - monitor for improvement in vision, if no improvement, would consider surgery    - discussed using possible celluvisc in well to minimize bubbles.     2. 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 Glaucoma, each eye                         - on xalatan, alphagan, and cosopt (timolol and dorzolamide separately now) OU                         - intraocular pressure good and stable    - saw Brown 9/2020, can establish care with Dr. Webster on f/u    4. Lagophthalmos, each eye             - clear corneas, pt comfortable                         - continue refresh at bedtime both eyes    RTC: 2 months for recheck     Jason Goldberg, MD  Cornea & External Disease Fellow  Department of Ophthalmology and Visual Neurosciences    Attending Physician Attestation:  Complete documentation of historical and exam elements from today's encounter can be found in the full encounter summary report (not reduplicated in this progress note).  I personally obtained the chief complaint(s) and history of present illness.  I confirmed and edited as necessary the review of systems, past medical/surgical history, family history, social history, and examination findings as documented by others; and I examined the patient myself.  I personally reviewed the relevant tests, images, and reports as documented above.  I formulated and edited as necessary the assessment and plan and discussed the findings and management plan with the patient and family. - Carter Salinas MD    --------------------------------------------------------------------------------------------------------------------------------------------  Pachymetry - Interpretation & Report  Indication: post corneal transplant OD, bullous keratopathy OD  Performed by: Carter Salinas MD  Reliability: good  Patient cooperation: good  Findings:   Right eye:  657 micrometers centrally   Interval Change, Assessment, & Impact on treatment:   Right eye:  Slightly improved K edema, but poor quality measurement  Signed: Carter Salinas MD 3/26/2021 9:12 AM      I personally spent great than 40min with the patient, of which >50% of the time was spent face to face with the patient, counseling and coordinating care with the patient. We discussed the complexity of her diagnosis, the need for further information prior to  proceeding with yet another surgery, and the unknown prognosis for the patient at this time. Discussed and coordinated care with Dr. Garcia.    Carter Salinas MD

## 2021-04-16 ENCOUNTER — TELEPHONE (OUTPATIENT)
Dept: OPHTHALMOLOGY | Facility: CLINIC | Age: 21
End: 2021-04-16

## 2021-04-19 ENCOUNTER — OFFICE VISIT (OUTPATIENT)
Dept: OPHTHALMOLOGY | Facility: CLINIC | Age: 21
End: 2021-04-19
Attending: OPHTHALMOLOGY
Payer: COMMERCIAL

## 2021-04-19 DIAGNOSIS — H50.111 EXOTROPIA OF RIGHT EYE: ICD-10-CM

## 2021-04-19 DIAGNOSIS — H21.563 PUPIL IRREGULAR OF BOTH EYES: ICD-10-CM

## 2021-04-19 DIAGNOSIS — Q15.0 CONGENITAL GLAUCOMA: ICD-10-CM

## 2021-04-19 DIAGNOSIS — H54.3 LOW VISION, BOTH EYES: ICD-10-CM

## 2021-04-19 DIAGNOSIS — Q93.59: ICD-10-CM

## 2021-04-19 DIAGNOSIS — H52.32 ANISOMETROPIA AND ANISEIKONIA: ICD-10-CM

## 2021-04-19 DIAGNOSIS — Q13.4 PETER'S ANOMALY: ICD-10-CM

## 2021-04-19 DIAGNOSIS — H52.31 ANISOMETROPIA AND ANISEIKONIA: ICD-10-CM

## 2021-04-19 DIAGNOSIS — Z94.7 CORNEA REPLACED BY TRANSPLANT: Primary | ICD-10-CM

## 2021-04-19 DIAGNOSIS — H02.203 LAGOPHTHALMOS OF EYELIDS OF BOTH EYES, UNSPECIFIED EYELID, UNSPECIFIED LAGOPHTHALMOS TYPE: ICD-10-CM

## 2021-04-19 DIAGNOSIS — H55.01 NYSTAGMUS, CONGENITAL: ICD-10-CM

## 2021-04-19 DIAGNOSIS — H02.206 LAGOPHTHALMOS OF EYELIDS OF BOTH EYES, UNSPECIFIED EYELID, UNSPECIFIED LAGOPHTHALMOS TYPE: ICD-10-CM

## 2021-04-19 PROCEDURE — 99213 OFFICE O/P EST LOW 20 MIN: CPT | Performed by: OPHTHALMOLOGY

## 2021-04-19 PROCEDURE — 92015 DETERMINE REFRACTIVE STATE: CPT

## 2021-04-19 PROCEDURE — G0463 HOSPITAL OUTPT CLINIC VISIT: HCPCS

## 2021-04-19 ASSESSMENT — CONF VISUAL FIELD
OS_INFERIOR_TEMPORAL_RESTRICTION: 3
OS_SUPERIOR_TEMPORAL_RESTRICTION: 3
OD_SUPERIOR_NASAL_RESTRICTION: 3
OS_INFERIOR_NASAL_RESTRICTION: 3
OD_SUPERIOR_TEMPORAL_RESTRICTION: 3
METHOD: COUNTING FINGERS
OD_INFERIOR_NASAL_RESTRICTION: 1
OD_INFERIOR_TEMPORAL_RESTRICTION: 1
OS_SUPERIOR_NASAL_RESTRICTION: 3

## 2021-04-19 ASSESSMENT — REFRACTION
OS_SPHERE: -7.00
OS_CYLINDER: +3.50
OS_AXIS: 120
OD_CYLINDER: SPHERE

## 2021-04-19 ASSESSMENT — SLIT LAMP EXAM - LIDS
COMMENTS: EXOPHTHALMOS R>L
COMMENTS: EXOPHTHALMOS R>L

## 2021-04-19 ASSESSMENT — TONOMETRY
OD_IOP_MMHG: 12
OS_IOP_MMHG: 11
OD_IOP_MMHG: 11
IOP_METHOD: ICARE T

## 2021-04-19 ASSESSMENT — EXTERNAL EXAM - LEFT EYE: OS_EXAM: PROTOSIS R>L, FAIR LID CLOSURE

## 2021-04-19 ASSESSMENT — EXTERNAL EXAM - RIGHT EYE: OD_EXAM: PROTOSIS R>L, FAIR LID CLOSURE

## 2021-04-19 ASSESSMENT — PACHYMETRY: OD_CT(UM): 657

## 2021-04-19 ASSESSMENT — VISUAL ACUITY
OS_CC: 20/100
METHOD: SNELLEN - LINEAR
CORRECTION_TYPE: GLASSES, CONTACTS

## 2021-04-19 ASSESSMENT — REFRACTION_WEARINGRX
OD_CYLINDER: SPHERE
OS_CYLINDER: +3.00
OS_SPHERE: -6.50
OD_SPHERE: -3.50
OS_AXIS: 120

## 2021-04-19 NOTE — PROGRESS NOTES
Chief Complaint(s) and History of Present Illness(es)     Amblyopia Follow Up     In both eyes. Additional comments: Secondary to peter's anomaly. No issues with ctx. No redness or discomfort. VA in RE improving but not as good as when ctx was first updated. WGFT.              Glaucoma Follow-Up     Associated symptoms include Negative for redness, tearing and photophobia.  Treatment compliance is always.              Comments     Current Ocular Meds:  PF BID OD (6:30am)  Cosopt BID, OU (6:35am)  Alphagan BID OU (6:40am)  Xalatan QHS OU (9:30pm)  FML BID OU (6:45am)  Refresh PM QHS PRN each eye  Artificial tears PRN each eye   oflaxacin RE when wearing soft ctl            Review of systems for the eyes was negative other than the pertinent positives and negatives noted in the HPI.   History is obtained from the patient and father.     Primary care: Cailin Maxwell   Referring provider: Cailin Maxwell  [facility] is home  Assessment & Plan   Ameena Solorzano is a 20 year old female who presents with:     Peter's anomaly, post corneal transplant (PKP) both eyes   Worsened visual acuity today unable to be improved with refraction. Visual acuity right eye 14/600.   - Lyndsay feels she sees better with the contact lens in than out. Fine to continue contact lens wear. See Dr. Salinas as planned to discuss possible surgery.   - Taper off prednisolone acetate. Continue fluorometholone twice a day.     Congenital glaucoma  Low intraocular pressure by [employer] today.  - Continue present management: cosopt twice a day, alphagan twice a day, xalatan at bedtime. All both eyes. Establish with Dr. Morfin.     Anisometropia and aniseikonia  Irregular astigmatism of both eyes  Low vision, both eyes   Best corrected visual acuity in 2019 ranged from 20/125-20/200 right eye.   RE 14/600, LE 20/100  - Continue full time glasses wear. Updated glasses last week. Fine to continue with present glasses.     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    Exotropia of right eye Stable. Monitor.    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 Greatly limits posterior views.     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        Return in about 1 year (around 4/19/2022) for Dr Fam, next available Dr. Morfin and Dr. Salinas in June as planned.   25 minutes spent on the date of the encounter doing chart review, history and exam, documentation and further activities as noted above.      Patient Instructions   Continue full time glasses wear. Continue contact lenses. Return to clinic with Dr. Fam in 1 year; please call if having a sedated procedure at [facility] so that an bilateral eye examination under anesthesia can be added.    RIGHT EYE:  Prednisolone acetate 1 drop in the right eye for 1 week then stop.     BOTH EYES:  1. Xalatan at bedtime  2. Alphagan twice a day   3. Cosopt twice a day   4. Fluorometholone twice a day    Continue to monitor Ameena's visual function and eye alignment until your next visit with us.  If vision or eye alignment appear to be worsening or if you have any new concerns, please contact our office.  A sooner assessment by Dr. Fam or our orthoptic team may be necessary.    Return to clinic:  Dr. Fam, 1 year sooner as needed   Dr. Salinas in June  Dr. PHOENIX DELGADO next available for Glaucoma: To schedule an appointment: call the adult eye clinic  at [phone]. Your appointment will be on the [facility] in [place]: [facility] ([facility]), 9th floor, Ophthalmology Clinic. [address] [place] [id number].          Visit Diagnoses & Orders    ICD-10-CM    1. Cornea replaced by transplant - Both Eyes  Z94.7    2. Peter's anomaly  Q13.89    3. Nystagmus, congenital  H55.01    4. Low vision, both eyes  H54.3    5.  Congenital glaucoma  Q15.0    6. Pupil irregular of both eyes  H21.563    7. Anisometropia and aniseikonia  H52.31    8. Exotropia of right eye  H50.10    9. 6p partial monosomy syndrome  Q93.59    10. Lagophthalmos of eyelids of both eyes, unspecified eyelid, unspecified lagophthalmos type  H02.203     H02.206       Attending Physician Attestation:  Complete documentation of historical and exam elements from today's encounter can be found in the full encounter summary report (not reduplicated in this progress note).  I personally obtained the chief complaint(s) and history of present illness.  I confirmed and edited as necessary the review of systems, past medical/surgical history, family history, social history, and examination findings as documented by others; and I examined the patient myself.  I personally reviewed the relevant tests, images, and reports as documented above.  I formulated and edited as necessary the assessment and plan and discussed the findings and management plan with the patient and family. - Romina Fam MD

## 2021-04-19 NOTE — LETTER
4/19/2021    To: Cailin Maxwell MD  [facility]  [address]  [facility] [address]    Re:  Ameena Solorzano    YOB: 2000    MRN: [id number]    Dear Colleague,     It was my pleasure to see Ameena on 4/19/2021.  In summary, Ameena Solorzano is a 20 year old female who presents with:     Peter's anomaly, post corneal transplant (PKP) both eyes   Worsened visual acuity today unable to be improved with refraction. Visual acuity right eye 14/600.   - Lyndsay feels she sees better with the contact lens in than out. Fine to continue contact lens wear. See Dr. Salinas as planned to discuss possible surgery.   - Taper off prednisolone acetate. Continue fluorometholone twice a day.     Congenital glaucoma  Low intraocular pressure by [facility] today.  - Continue present management: cosopt twice a day, alphagan twice a day, xalatan at bedtime. All both eyes. Establish with Dr. Morfin.     Anisometropia and aniseikonia  Irregular astigmatism of both eyes  Low vision, both eyes   Best corrected visual acuity in 2019 ranged from 20/125-20/200 right eye.   RE 14/600, LE 20/100  - Continue full time glasses wear. Updated glasses last week. Fine to continue with present glasses.     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    Exotropia of right eye Stable. Monitor.    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 Greatly limits posterior views.     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      Thank you for the opportunity to care for Ameena. I have asked her to Return in about 1 year (around 4/19/2022) for Dr Fam, next available Dr. Morfin and Dr. Salinas in June as planned.  Until then, please do not hesitate to contact me or my clinic with any questions or concerns.          Warm regards,          Romina Fam,  MD                 Pediatric Ophthalmology & Strabismus        Department of Ophthalmology & Visual Neurosciences        [facility]   CC:  Lyndsay Solorzano

## 2021-04-19 NOTE — PATIENT INSTRUCTIONS
Continue full time glasses wear. Continue contact lenses. Return to clinic with Dr. Fam in 1 year; please call if having a sedated procedure at [facility] so that an bilateral eye examination under anesthesia can be added.    RIGHT EYE:  Prednisolone acetate 1 drop in the right eye for 1 week then stop.     BOTH EYES:  1. Xalatan at bedtime  2. Alphagan twice a day   3. Cosopt twice a day   4. Fluorometholone twice a day    Continue to monitor Ameena's visual function and eye alignment until your next visit with us.  If vision or eye alignment appear to be worsening or if you have any new concerns, please contact our office.  A sooner assessment by Dr. Fam or our orthoptic team may be necessary.    Return to clinic:  Dr. Fam, 1 year sooner as needed   Dr. Salinas in June  Dr. PHOENIX DELGADO next available for Glaucoma: To schedule an appointment: call the adult eye clinic  at [phone]. Your appointment will be on the [facility] in [place]: [facility]), 9th floor, Ophthalmology Clinic. [address] [place] [id number].

## 2021-04-19 NOTE — NURSING NOTE
Chief Complaint(s) and History of Present Illness(es)     Amblyopia Follow Up     Laterality: both eyes    Comments: Secondary to peter's anomaly. No issues with ctx. No redness or discomfort. VA in RE improving but not as good as when ctx was first updated. WGFT.              Glaucoma Follow-Up     Associated symptoms: Negative for redness, tearing and photophobia    Compliance with Treatment: always              Comments     Current Ocular Meds:  PF BID OD (6:30am)  Cosopt BID, OU (6:35am)  Alphagan BID OU (6:40am)  Xalatan QHS OU (9:30pm)  FML BID OU (6:45am)  Refresh PM QHS PRN each eye  Artificial tears PRN each eye   oflaxacin RE when wearing soft ctl

## 2021-05-04 ENCOUNTER — OFFICE VISIT (OUTPATIENT)
Dept: OPHTHALMOLOGY | Facility: CLINIC | Age: 21
End: 2021-05-04
Attending: OPHTHALMOLOGY
Payer: COMMERCIAL

## 2021-05-04 DIAGNOSIS — H55.01 NYSTAGMUS, CONGENITAL: ICD-10-CM

## 2021-05-04 DIAGNOSIS — Z94.7 CORNEA REPLACED BY TRANSPLANT: ICD-10-CM

## 2021-05-04 DIAGNOSIS — Q93.59: ICD-10-CM

## 2021-05-04 DIAGNOSIS — H54.3 LOW VISION, BOTH EYES: ICD-10-CM

## 2021-05-04 DIAGNOSIS — Q13.4 PETER'S ANOMALY: Primary | ICD-10-CM

## 2021-05-04 DIAGNOSIS — Q15.0 CONGENITAL GLAUCOMA: ICD-10-CM

## 2021-05-04 PROCEDURE — 99214 OFFICE O/P EST MOD 30 MIN: CPT | Performed by: OPHTHALMOLOGY

## 2021-05-04 PROCEDURE — G0463 HOSPITAL OUTPT CLINIC VISIT: HCPCS

## 2021-05-04 PROCEDURE — 92250 FUNDUS PHOTOGRAPHY W/I&R: CPT | Performed by: OPHTHALMOLOGY

## 2021-05-04 ASSESSMENT — VISUAL ACUITY
CORRECTION_TYPE: GLASSES, CONTACTS
METHOD: SNELLEN - LINEAR
OD_CC: 2/200 E
OS_CC: 20/50
OS_CC+: +2

## 2021-05-04 ASSESSMENT — TONOMETRY
OD_IOP_MMHG: 16
IOP_METHOD: TONOPEN
OS_IOP_MMHG: 12
OS_IOP_MMHG: 14
OD_IOP_MMHG: 15
IOP_METHOD: TONOPEN

## 2021-05-04 ASSESSMENT — CONF VISUAL FIELD
OS_INFERIOR_TEMPORAL_RESTRICTION: 3
OS_SUPERIOR_NASAL_RESTRICTION: 3
OD_INFERIOR_TEMPORAL_RESTRICTION: 1
OD_INFERIOR_NASAL_RESTRICTION: 1
OD_SUPERIOR_NASAL_RESTRICTION: 3
OS_SUPERIOR_TEMPORAL_RESTRICTION: 3
OD_SUPERIOR_TEMPORAL_RESTRICTION: 3
OS_INFERIOR_NASAL_RESTRICTION: 3

## 2021-05-04 ASSESSMENT — EXTERNAL EXAM - RIGHT EYE: OD_EXAM: PROTOSIS R>L, FAIR LID CLOSURE

## 2021-05-04 ASSESSMENT — REFRACTION_WEARINGRX
OS_CYLINDER: +3.00
OD_SPHERE: -3.50
OD_CYLINDER: SPHERE
OS_AXIS: 120
OS_SPHERE: -6.50

## 2021-05-04 ASSESSMENT — EXTERNAL EXAM - LEFT EYE: OS_EXAM: PROTOSIS R>L, FAIR LID CLOSURE

## 2021-05-04 ASSESSMENT — SLIT LAMP EXAM - LIDS
COMMENTS: EXOPHTHALMOS R>L
COMMENTS: EXOPHTHALMOS R>L

## 2021-05-04 NOTE — LETTER
5/4/2021       RE: Ameena Solorzano  [address]  [place] [address]     Dear Colleague,    Thank you for referring your patient, Ameena Solorzano. Please see a copy of my visit note below.    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 Reyna for glaucoma management     Chief Complaint/Presenting Concern: Here for glaucoma evlauation    History of Present Illness:   Ameena Solorzano is a 20 year old patient who presents for evaluation of glaucoma. She is known to have Pressley anomaly diagnosed at birth in each eye, underwent bilateral PKP to both eyes in the first month of life and has not required any repeat PKP since then. She started developing corneal edema and band kertatopathy in the right eye, s/p EDTA Chelation by Dr. Salinas on 09/3/2020. On the same day of the procedure with Dr. Salinas, Dr. Fam performed examination of both eyes under anesthesia and recommended repeat EUA in 6-12 months. Patient reports there isn't much improvement in vision in the right eye post EDTA chelation, denies any eye pain. Using her glaucoma drops regularly.     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   Previous glaucoma surgery/laser: spontaneous bleb formation right eye post patch graft  Maximum intraocular pressure: unknown   Currently Meds: xalatan at bedtime each eye, alphagan BID each eye, and cosopt BID each eye   Gonio: unable to perform   Refractive status: Myopia  Steroid exposure: positive, topical FML BID each eye   Meds AEs/intolerance: None   PMHx: No history of Asthma and respiratory problems/Cardiac/Renal/Kidney stones/Sulfa  Allergy    Today's testing:  IOP stable each eye   Fundus photo (5/4/21): poor quality due to nystagmus     Additional Ocular History:    2. Pressley anomaly both eyes  - S/P bilateral penetrating keratoplasty (PK) as an infant (2000), with revision  - S/p EDTA chelation right eye 9/3/2020.  Over the interval, working with Dr. Garcia on scleral lens fittings.  - using FML twice a day  - Follows w/ Dr. Fam and Dr. Salinas  - Dr. Salinas may consider PKP versus KPro    3. 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 Congenital nystagmus    5. Dandy Walker Syndrome    6. Exotropia of right eye  - Stable  - Following with Dr. Fam     Plan/Recommendations:    Discussed findings with patient.    Difficult to tell if there is any progression in glaucoma given limited information on ONH status. Recommended EUA repeat at the time of the possible PKP with Dr. Salinas to compare ONH photos to latest images performed by Dr. Fam during EUA on 09/03/2020. Will try to retrieve ONH imaging from the time of EUA at the time of Dr. Reyna.     If Dr. Salinas doesn't plan on surgery then will schedule separate EUA in 2-3 months.     Continue Xalatan at bedtime each eye     Continue Alphagan twice a day each eye     Continue Cosopt twice a day each eye     Continue Fluorometholone twice a day each eye     Follow up with Dr. Fam as scheduled    30 minutes spent on the date of the encounter doing chart review, patient visit, documentation and discussion with patient     Physician Attestation     Attending Physician Attestation:  Complete documentation of historical and exam elements from today's encounter can be found in the full encounter summary report (not reduplicated in this progress note). I personally obtained the chief complaint(s) and history of present illness. I confirmed and edited as necessary the review of systems, past medical/surgical history, family history, social history, and examination findings as  documented by others; and I examined the patient myself. I personally reviewed the relevant tests, images, and reports as documented above. I personally reviewed the ophthalmic test(s) associated with this encounter. I formulated and edited as necessary the assessment and plan and discussed the findings and management plan with the patient and any family members present at the time of the visit.  Jono Webster M.D., Glaucoma, May 4, 2021       Again, thank you for allowing me to participate in the care of your patient.      Sincerely,    Jono Webster MD

## 2021-05-04 NOTE — NURSING NOTE
Chief Complaints and History of Present Illnesses   Patient presents with     Glaucoma Evaluation     Congenital Glaucoma BE     Chief Complaint(s) and History of Present Illness(es)     Glaucoma Evaluation     Comments: Congenital Glaucoma BE              Comments     Per dad, vision is worsening in the RE.   No eye pain today. Redness in RE that comes and goes.  Pt newly started wearing contacts in her RE. Pt wearing SCL (no correction) today. Pt also will wear a RGP (has some correction).    HALIMA Edwards May 4, 2021 7:37 AM

## 2021-05-04 NOTE — PATIENT INSTRUCTIONS
BOTH EYES:  1. Xalatan at bedtime  2. Alphagan twice a day   3. Cosopt twice a day   4. Fluorometholone twice a day

## 2021-05-04 NOTE — PROGRESS NOTES
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 Reyna for glaucoma management     Chief Complaint/Presenting Concern: Here for glaucoma evlauation    History of Present Illness:   Ameena Solorzano is a 20 year old patient who presents for evaluation of glaucoma. She is known to have Pressley anomaly diagnosed at birth in each eye, underwent bilateral PKP to both eyes in the first month of life and has not required any repeat PKP since then. She started developing corneal edema and band kertatopathy in the right eye, s/p EDTA Chelation by Dr. Salinas on 09/3/2020. On the same day of the procedure with Dr. Salinas, Dr. Fam performed examination of both eyes under anesthesia and recommended repeat EUA in 6-12 months. Patient reports there isn't much improvement in vision in the right eye post EDTA chelation, denies any eye pain. Using her glaucoma drops regularly.     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   Previous glaucoma surgery/laser: spontaneous bleb formation right eye post patch graft  Maximum intraocular pressure: unknown   Currently Meds: xalatan at bedtime each eye, alphagan BID each eye, and cosopt BID each eye   Gonio: unable to perform   Refractive status: Myopia  Steroid exposure: positive, topical FML BID each eye   Meds AEs/intolerance: None   PMHx: No history of Asthma and respiratory problems/Cardiac/Renal/Kidney stones/Sulfa Allergy    Today's testing:  IOP stable each eye   Fundus photo (5/4/21): poor quality due to nystagmus     Additional Ocular History:    2. Pressley anomaly both eyes  - S/P bilateral penetrating keratoplasty (PK) as an  infant (2000), with revision  - S/p EDTA chelation right eye 9/3/2020.  Over the interval, working with Dr. Garcia on scleral lens fittings.  - using FML twice a day  - Follows w/ Dr. Fam and Dr. Salinas  - Dr. Salinas may consider PKP versus KPro    3. 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 Congenital nystagmus    5. Dandy Walker Syndrome    6. Exotropia of right eye  - Stable  - Following with Dr. Fam     Plan/Recommendations:    Discussed findings with patient.    Difficult to tell if there is any progression in glaucoma given limited information on ONH status. Recommended EUA repeat at the time of the possible PKP with Dr. Salinas to compare ONH photos to latest images performed by Dr. Fam during EUA on 09/03/2020. Will try to retrieve ONH imaging from the time of EUA at the time of Dr. Reyna.     If Dr. Salinas doesn't plan on surgery then will schedule separate EUA in 2-3 months.     Continue Xalatan at bedtime each eye     Continue Alphagan twice a day each eye     Continue Cosopt twice a day each eye     Continue Fluorometholone twice a day each eye     Follow up with Dr. Fam as scheduled    30 minutes spent on the date of the encounter doing chart review, patient visit, documentation and discussion with patient     Physician Attestation     Attending Physician Attestation:  Complete documentation of historical and exam elements from today's encounter can be found in the full encounter summary report (not reduplicated in this progress note). I personally obtained the chief complaint(s) and history of present illness. I confirmed and edited as necessary the review of systems, past medical/surgical history, family history, social history, and examination findings as documented by others; and I examined the patient myself. I personally reviewed the relevant tests, images, and reports as documented above. I personally reviewed the ophthalmic test(s) associated with this encounter. I  formulated and edited as necessary the assessment and plan and discussed the findings and management plan with the patient and any family members present at the time of the visit.  Jono Webster M.D., Glaucoma, May 4, 2021

## 2021-06-07 ENCOUNTER — OFFICE VISIT (OUTPATIENT)
Dept: OPHTHALMOLOGY | Facility: CLINIC | Age: 21
End: 2021-06-07
Attending: OPHTHALMOLOGY
Payer: COMMERCIAL

## 2021-06-07 DIAGNOSIS — H21.563 PUPIL IRREGULAR OF BOTH EYES: ICD-10-CM

## 2021-06-07 DIAGNOSIS — Q15.0 CONGENITAL GLAUCOMA: ICD-10-CM

## 2021-06-07 DIAGNOSIS — H54.3 LOW VISION, BOTH EYES: ICD-10-CM

## 2021-06-07 DIAGNOSIS — H02.203 LAGOPHTHALMOS OF EYELIDS OF BOTH EYES, UNSPECIFIED EYELID, UNSPECIFIED LAGOPHTHALMOS TYPE: ICD-10-CM

## 2021-06-07 DIAGNOSIS — Q13.4 PETER'S ANOMALY: Primary | ICD-10-CM

## 2021-06-07 DIAGNOSIS — H55.01 NYSTAGMUS, CONGENITAL: ICD-10-CM

## 2021-06-07 DIAGNOSIS — H02.206 LAGOPHTHALMOS OF EYELIDS OF BOTH EYES, UNSPECIFIED EYELID, UNSPECIFIED LAGOPHTHALMOS TYPE: ICD-10-CM

## 2021-06-07 DIAGNOSIS — Z94.7 CORNEA REPLACED BY TRANSPLANT: ICD-10-CM

## 2021-06-07 PROCEDURE — 99215 OFFICE O/P EST HI 40 MIN: CPT | Mod: GC | Performed by: OPHTHALMOLOGY

## 2021-06-07 PROCEDURE — G0463 HOSPITAL OUTPT CLINIC VISIT: HCPCS

## 2021-06-07 ASSESSMENT — TONOMETRY
OD_IOP_MMHG: 14
IOP_METHOD: ICARE
OS_IOP_MMHG: 09

## 2021-06-07 ASSESSMENT — VISUAL ACUITY
CORRECTION_TYPE: GLASSES
OD_SC: CF1'
OS_SC: 20/60-2/+2
METHOD: SNELLEN - LINEAR

## 2021-06-07 ASSESSMENT — SLIT LAMP EXAM - LIDS
COMMENTS: EXOPHTHALMOS R>L
COMMENTS: EXOPHTHALMOS R>L

## 2021-06-07 ASSESSMENT — EXTERNAL EXAM - LEFT EYE: OS_EXAM: PROTOSIS R>L, FAIR LID CLOSURE

## 2021-06-07 ASSESSMENT — EXTERNAL EXAM - RIGHT EYE: OD_EXAM: PROTOSIS R>L, FAIR LID CLOSURE

## 2021-06-07 NOTE — PROGRESS NOTES
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    Interval Hx:   s/p EDTA chelation right eye 9/3/2020.  Over the interval, has seen both Dr. Curtis and Eleanor. Vision basically stable.  No pain.  Parents notice occasional redness when contact is in.  Per Dr. ADAN, SCL fit + comfort excellent.       Current Ocular Meds:  Cosopt BID, OU  Alphagan BID OU  Xalatan QHS OU  FML BID OU  Refresh PM QHS PRN each eye  Artificial tears PRN each eye     A/P:    0. s/p EDTA chelation right eye 9/3/20    1. Pressley anomaly both eyes 2000                        - S/P bilateral penetrating keratoplasty (PK)             - band keratopathy inferiorly right eye now s/p EDTA chelation (See above), but still with remaining scarring.    - follows with Dr. Curtis, Dr. Garcia, glaucoma service.     - working with Dr. Garcia on scleral lens fittings.. At visit 3/18/21, Dr. Garcia noted decrease in vision from 20/150 to CF, suspected early K edema -- improved to 20/200- w/ +6.00 OR that day. Per Dr. ADAN, SCL fit excellent.    - today 6/7 vision CF 1 ft but no scleral lens today on 5/4 was 20/200 E with scleral lens.     - Graft with edema, not clear.     - continue current gtts    PLAN:    - discussed r/b/a PC of repeat PKP.  Family amenable and would like to proceed. Will plan for repeat PKP.      - could consider high dose steroid to delay surgery, but would get clearance from glaucoma first.      - family would like to see Dr. Lopez as they followed with him before he left [facility].  Would like to see him now he's back.      - discussed using possible celluvisc in well to minimize bubbles.     2. 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 Glaucoma, each eye                         - on xalatan, alphagan, and cosopt (timolol and  dorzolamide separately now) OU                        - intraocular pressure good and stable    - established care with Eleanor, plan for EUA summer 2021 if no PKP with Dr. Salinas.    4. Lagophthalmos, each eye             - clear corneas, pt comfortable                         - continue refresh at bedtime both eyes      RTC:  next 4-6 weeks Dr. Kaufman Jason Goldberg, MD  Cornea & External Disease Fellow  Department of Ophthalmology and Visual Neurosciences    Attending Physician Attestation:  Complete documentation of historical and exam elements from today's encounter can be found in the full encounter summary report (not reduplicated in this progress note).  I personally obtained the chief complaint(s) and history of present illness.  I confirmed and edited as necessary the review of systems, past medical/surgical history, family history, social history, and examination findings as documented by others; and I examined the patient myself.  I personally reviewed the relevant tests, images, and reports as documented above.  I formulated and edited as necessary the assessment and plan and discussed the findings and management plan with the patient and family. - Carter Salinas MD    I personally spent great than 40min with the patient, of which >50% of the time was spent face to face with the patient, counseling and coordinating care with the patient. We discussed the complexity of her diagnosis, the need for further information prior to proceeding with yet another surgery, and the unknown prognosis for the patient at this time.    Carter Salinas MD

## 2021-07-08 ENCOUNTER — OFFICE VISIT (OUTPATIENT)
Dept: OPHTHALMOLOGY | Facility: CLINIC | Age: 21
End: 2021-07-08
Attending: OPHTHALMOLOGY
Payer: COMMERCIAL

## 2021-07-08 DIAGNOSIS — Z94.7 CORNEA REPLACED BY TRANSPLANT: ICD-10-CM

## 2021-07-08 DIAGNOSIS — Q15.0 CONGENITAL GLAUCOMA: ICD-10-CM

## 2021-07-08 DIAGNOSIS — H54.3 LOW VISION, BOTH EYES: ICD-10-CM

## 2021-07-08 DIAGNOSIS — H21.563 PUPIL IRREGULAR OF BOTH EYES: ICD-10-CM

## 2021-07-08 DIAGNOSIS — Q13.4 PETER'S ANOMALY: Primary | ICD-10-CM

## 2021-07-08 PROCEDURE — 99214 OFFICE O/P EST MOD 30 MIN: CPT | Mod: GC | Performed by: OPHTHALMOLOGY

## 2021-07-08 PROCEDURE — G0463 HOSPITAL OUTPT CLINIC VISIT: HCPCS

## 2021-07-08 PROCEDURE — 92285 EXTERNAL OCULAR PHOTOGRAPHY: CPT | Performed by: OPHTHALMOLOGY

## 2021-07-08 ASSESSMENT — VISUAL ACUITY
METHOD: SNELLEN - LINEAR
OS_CC+: -2
OD_CC: 3/200 E
CORRECTION_TYPE: GLASSES
OS_CC: 20/50

## 2021-07-08 ASSESSMENT — CONF VISUAL FIELD
OS_INFERIOR_TEMPORAL_RESTRICTION: 3
OS_SUPERIOR_TEMPORAL_RESTRICTION: 3
OS_INFERIOR_NASAL_RESTRICTION: 3
OD_SUPERIOR_TEMPORAL_RESTRICTION: 3
OD_SUPERIOR_NASAL_RESTRICTION: 3
OS_SUPERIOR_NASAL_RESTRICTION: 3
OD_INFERIOR_TEMPORAL_RESTRICTION: 1
OD_INFERIOR_NASAL_RESTRICTION: 1

## 2021-07-08 ASSESSMENT — REFRACTION_WEARINGRX
OD_SPHERE: -3.50
OS_SPHERE: -6.50
OS_CYLINDER: +3.00
OS_AXIS: 120
OD_CYLINDER: SPHERE

## 2021-07-08 ASSESSMENT — SLIT LAMP EXAM - LIDS
COMMENTS: EXOPHTHALMOS R>L
COMMENTS: EXOPHTHALMOS R>L

## 2021-07-08 ASSESSMENT — TONOMETRY
OS_IOP_MMHG: 07
IOP_METHOD: ICARE
OD_IOP_MMHG: 10

## 2021-07-08 ASSESSMENT — EXTERNAL EXAM - LEFT EYE: OS_EXAM: PROTOSIS R>L, FAIR LID CLOSURE

## 2021-07-08 NOTE — PROGRESS NOTES
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    Interval Hx:   s/p EDTA chelation/ SK  right eye 9/3/2020. LCV  6/7/21 with Dr. Salinas. Patient is here with both parents. Eyes are feeling comfortable. They are using the eye drops below daily without problems. Would like to re-establish care with Dr. Lopez. Were discussing repeat PKP right eye at last visit with Dr. Salinas. Would like to discuss this and see whether can schedule together with EUA with Dr. Webster.        Current Ocular Meds:  Cosopt BID, OU  Alphagan BID OU  Xalatan QHS OU  FML BID OU  Refresh PM QHS PRN each eye  Artificial tears PRN each eye     Assessment and Plan:    0. s/p EDTA chelation and superficial  Keratectomy right eye 9/3/20 (Dr. Salinas)    1. Pressley anomaly both eyes 2000                        - S/P bilateral penetrating keratoplasty (PK) in 2000 age 12 days and 14 days              - band keratopathy inferiorly right eye now s/p EDTA chelation (See above), but still with remaining scarring.    - Vision today 3/200, IOP 10    - follows with Dr. Fam, Dr. Garcia, Dr. Webster.     - working with Dr. Garcia on scleral lens fittings. At visit 3/18/21, Dr. Garcia noted decrease in vision from 20/150 to CF, suspected early K edema -- improved to 20/200- w/ +6.00 OR that day. Per Dr. ADAN, SCL fit excellent.    - Graft with edema and KNV, not clear.     - continue current gtts    PLAN:    - discussed r/b/a PC of repeat PKP, waiting, vs Mary[place]uy Flap vs k-pro.  Discussed timing summer vs [place] holidays to minimize missing school. Family amenable and would like to proceed. Will plan for repeat PKP.       2. 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 Glaucoma, each eye                         - on xalatan, alphagan, and cosopt (timolol  and dorzolamide separately now) OU                        - intraocular pressure good and stable    - established care with Eleanor, plan for EUA summer 2021 if no PKP with Dr. Salinas.    4. Lagophthalmos, each eye             - clear corneas, pt comfortable                         - continue refresh at bedtime both eyes      RTC:  6 months.    Mel Dunn MD  Ophthalmology Resident, PGY-3  [facility]     Attending Physician Attestation:  Complete documentation of historical and exam elements from today's encounter can be found in the full encounter summary report (not reduplicated in this progress note).  I personally obtained the chief complaint(s) and history of present illness.  I confirmed and edited as necessary the review of systems, past medical/surgical history, family history, social history, and examination findings as documented by others; and I examined the patient myself.  I personally reviewed the relevant tests, images, and reports as documented above.  I formulated and edited as necessary the assessment and plan and discussed the findings and management plan with the patient and family. - Rebel Lopez MD

## 2021-07-08 NOTE — NURSING NOTE
Chief Complaints and History of Present Illnesses   Patient presents with     Follow Up     1 month follow up Pressley anomaly both eyes 2000     Chief Complaint(s) and History of Present Illness(es)     Follow Up     Comments: 1 month follow up Pressley anomaly both eyes 2000              Comments     Per mom, Scleral lenses are very difficult to get in and out. Pt has not been using contacts very much. Pt uses Scleral lenses about 2-3 times per week.  Pt does not feel that contacts make vision better.  No eye pain today. No dryness.    HALIMA Edwards July 8, 2021 7:33 AM

## 2021-07-08 NOTE — PATIENT INSTRUCTIONS
BOTH EYES CONTINUE:  1. Xalatan at bedtime  2. Alphagan twice a day   3. Cosopt twice a day   4. Fluorometholone twice a day  5. Lubrication at bedtime

## 2021-07-13 ENCOUNTER — TELEPHONE (OUTPATIENT)
Dept: OPHTHALMOLOGY | Facility: CLINIC | Age: 21
End: 2021-07-13

## 2021-07-13 PROBLEM — Q15.0 CONGENITAL GLAUCOMA: Status: ACTIVE | Noted: 2021-07-13

## 2021-07-13 NOTE — TELEPHONE ENCOUNTER
I called patient to schedule surgery with Dr. Rebel Lopez, I left a voicemail with callback # [phone]

## 2021-07-13 NOTE — TELEPHONE ENCOUNTER
Patient is scheduled for surgery with Dr. Rebel Lopez and Dr. Jono Webster     Spoke with: Kalpana (Mother)     Date of Surgery: 08/18     Location: [facility]:  [address] [place] [id number]     Informed patient they will need an adult : Yes     H&P will be completed at: PCP: [facility]     COVID testing: [facility] 08/16    Post Op scheduled on 08/19, 08/26, and 09/17     Surgery packet was mailed to both mom and dad's house. Second address: [address] [place] [id number]     Additional comments: Advised RN will call 1 - 2 business days prior with arrival time and instructions.

## 2021-07-15 DIAGNOSIS — Z11.59 ENCOUNTER FOR SCREENING FOR OTHER VIRAL DISEASES: ICD-10-CM

## 2021-08-10 DIAGNOSIS — Z94.7 CORNEA REPLACED BY TRANSPLANT: Primary | ICD-10-CM

## 2021-08-10 RX ORDER — OFLOXACIN 3 MG/ML
1 SOLUTION/ DROPS OPHTHALMIC 4 TIMES DAILY
Qty: 10 ML | Refills: 1 | Status: SHIPPED | OUTPATIENT
Start: 2021-08-10 | End: 2021-08-18

## 2021-08-10 RX ORDER — PREDNISOLONE ACETATE 10 MG/ML
1 SUSPENSION/ DROPS OPHTHALMIC 4 TIMES DAILY
Qty: 10 ML | Refills: 1 | Status: SHIPPED | OUTPATIENT
Start: 2021-08-10 | End: 2021-08-18

## 2021-08-16 ENCOUNTER — LAB (OUTPATIENT)
Dept: LAB | Facility: CLINIC | Age: 21
End: 2021-08-16
Payer: COMMERCIAL

## 2021-08-16 ENCOUNTER — TELEPHONE (OUTPATIENT)
Dept: OPHTHALMOLOGY | Facility: CLINIC | Age: 21
End: 2021-08-16

## 2021-08-16 DIAGNOSIS — Z11.59 ENCOUNTER FOR SCREENING FOR OTHER VIRAL DISEASES: ICD-10-CM

## 2021-08-16 PROCEDURE — U0003 INFECTIOUS AGENT DETECTION BY NUCLEIC ACID (DNA OR RNA); SEVERE ACUTE RESPIRATORY SYNDROME CORONAVIRUS 2 (SARS-COV-2) (CORONAVIRUS DISEASE [COVID-19]), AMPLIFIED PROBE TECHNIQUE, MAKING USE OF HIGH THROUGHPUT TECHNOLOGIES AS DESCRIBED BY CMS-2020-01-R: HCPCS

## 2021-08-16 PROCEDURE — U0005 INFEC AGEN DETEC AMPLI PROBE: HCPCS

## 2021-08-16 NOTE — TELEPHONE ENCOUNTER
Reached out to see if Kalpana would prefer to go forward with cornea portion of Wednesday surgery for Lyndsay or if she would like to reschedule to have both procedures done together. Left call back number [phone].

## 2021-08-17 ENCOUNTER — TRANSFERRED RECORDS (OUTPATIENT)
Dept: HEALTH INFORMATION MANAGEMENT | Facility: CLINIC | Age: 21
End: 2021-08-17

## 2021-08-17 ENCOUNTER — ANESTHESIA EVENT (OUTPATIENT)
Dept: SURGERY | Facility: AMBULATORY SURGERY CENTER | Age: 21
End: 2021-08-17
Payer: COMMERCIAL

## 2021-08-17 LAB — SARS-COV-2 RNA RESP QL NAA+PROBE: NEGATIVE

## 2021-08-18 ENCOUNTER — ANESTHESIA (OUTPATIENT)
Dept: SURGERY | Facility: AMBULATORY SURGERY CENTER | Age: 21
End: 2021-08-18
Payer: COMMERCIAL

## 2021-08-18 ENCOUNTER — HOSPITAL ENCOUNTER (OUTPATIENT)
Facility: AMBULATORY SURGERY CENTER | Age: 21
End: 2021-08-18
Attending: OPHTHALMOLOGY
Payer: COMMERCIAL

## 2021-08-18 ENCOUNTER — TELEPHONE (OUTPATIENT)
Dept: OPHTHALMOLOGY | Facility: CLINIC | Age: 21
End: 2021-08-18

## 2021-08-18 VITALS
DIASTOLIC BLOOD PRESSURE: 68 MMHG | BODY MASS INDEX: 21.4 KG/M2 | SYSTOLIC BLOOD PRESSURE: 102 MMHG | HEIGHT: 60 IN | WEIGHT: 109 LBS | RESPIRATION RATE: 31 BRPM | OXYGEN SATURATION: 94 % | HEART RATE: 93 BPM | TEMPERATURE: 97.8 F

## 2021-08-18 DIAGNOSIS — Q13.4 PETER'S ANOMALY: ICD-10-CM

## 2021-08-18 DIAGNOSIS — Q15.0 CONGENITAL GLAUCOMA: ICD-10-CM

## 2021-08-18 DIAGNOSIS — Q13.4 PETERS ANOMALY: Primary | ICD-10-CM

## 2021-08-18 DIAGNOSIS — H54.3 LOW VISION, BOTH EYES: ICD-10-CM

## 2021-08-18 DIAGNOSIS — Z94.7 CORNEA REPLACED BY TRANSPLANT: Primary | ICD-10-CM

## 2021-08-18 DIAGNOSIS — Z94.7 CORNEA REPLACED BY TRANSPLANT: ICD-10-CM

## 2021-08-18 LAB
GRAM STAIN RESULT: NORMAL
GRAM STAIN RESULT: NORMAL
HCG UR QL: NEGATIVE
INTERNAL QC OK POCT: NORMAL

## 2021-08-18 PROCEDURE — V2785 CORNEAL TISSUE PROCESSING: HCPCS | Mod: RT

## 2021-08-18 PROCEDURE — 87075 CULTR BACTERIA EXCEPT BLOOD: CPT | Performed by: OPHTHALMOLOGY

## 2021-08-18 PROCEDURE — 87070 CULTURE OTHR SPECIMN AEROBIC: CPT | Performed by: OPHTHALMOLOGY

## 2021-08-18 PROCEDURE — 87102 FUNGUS ISOLATION CULTURE: CPT | Performed by: OPHTHALMOLOGY

## 2021-08-18 PROCEDURE — 81025 URINE PREGNANCY TEST: CPT | Performed by: PATHOLOGY

## 2021-08-18 PROCEDURE — 87205 SMEAR GRAM STAIN: CPT | Performed by: OPHTHALMOLOGY

## 2021-08-18 PROCEDURE — 65730 CORNEAL TRANSPLANT: CPT | Mod: RT

## 2021-08-18 PROCEDURE — 65730 CORNEAL TRANSPLANT: CPT | Mod: RT | Performed by: OPHTHALMOLOGY

## 2021-08-18 DEVICE — EYE CORNEA PROCESS FEE FOR MN LIONS BANK: Type: IMPLANTABLE DEVICE | Site: EYE | Status: FUNCTIONAL

## 2021-08-18 RX ORDER — HYDRALAZINE HYDROCHLORIDE 20 MG/ML
5 INJECTION INTRAMUSCULAR; INTRAVENOUS EVERY 10 MIN PRN
Status: DISCONTINUED | OUTPATIENT
Start: 2021-08-18 | End: 2021-08-18 | Stop reason: HOSPADM

## 2021-08-18 RX ORDER — ACETAMINOPHEN 325 MG/1
975 TABLET ORAL ONCE
Status: DISCONTINUED | OUTPATIENT
Start: 2021-08-18 | End: 2021-08-18 | Stop reason: HOSPADM

## 2021-08-18 RX ORDER — SODIUM CHLORIDE, SODIUM LACTATE, POTASSIUM CHLORIDE, CALCIUM CHLORIDE 600; 310; 30; 20 MG/100ML; MG/100ML; MG/100ML; MG/100ML
INJECTION, SOLUTION INTRAVENOUS CONTINUOUS
Status: DISCONTINUED | OUTPATIENT
Start: 2021-08-18 | End: 2021-08-18 | Stop reason: HOSPADM

## 2021-08-18 RX ORDER — SODIUM CHLORIDE, SODIUM LACTATE, POTASSIUM CHLORIDE, CALCIUM CHLORIDE 600; 310; 30; 20 MG/100ML; MG/100ML; MG/100ML; MG/100ML
INJECTION, SOLUTION INTRAVENOUS CONTINUOUS
Status: DISCONTINUED | OUTPATIENT
Start: 2021-08-18 | End: 2021-08-19 | Stop reason: HOSPADM

## 2021-08-18 RX ORDER — DEXAMETHASONE SODIUM PHOSPHATE 4 MG/ML
INJECTION, SOLUTION INTRA-ARTICULAR; INTRALESIONAL; INTRAMUSCULAR; INTRAVENOUS; SOFT TISSUE PRN
Status: DISCONTINUED | OUTPATIENT
Start: 2021-08-18 | End: 2021-08-18

## 2021-08-18 RX ORDER — ERYTHROMYCIN 5 MG/G
OINTMENT OPHTHALMIC PRN
Status: DISCONTINUED | OUTPATIENT
Start: 2021-08-18 | End: 2021-08-18 | Stop reason: HOSPADM

## 2021-08-18 RX ORDER — MIDAZOLAM HYDROCHLORIDE 2 MG/ML
20 SYRUP ORAL ONCE
Status: COMPLETED | OUTPATIENT
Start: 2021-08-18 | End: 2021-08-18

## 2021-08-18 RX ORDER — PROPOFOL 10 MG/ML
INJECTION, EMULSION INTRAVENOUS PRN
Status: DISCONTINUED | OUTPATIENT
Start: 2021-08-18 | End: 2021-08-18

## 2021-08-18 RX ORDER — DEXAMETHASONE SODIUM PHOSPHATE 4 MG/ML
INJECTION, SOLUTION INTRA-ARTICULAR; INTRALESIONAL; INTRAMUSCULAR; INTRAVENOUS; SOFT TISSUE PRN
Status: DISCONTINUED | OUTPATIENT
Start: 2021-08-18 | End: 2021-08-18 | Stop reason: HOSPADM

## 2021-08-18 RX ORDER — ONDANSETRON 4 MG/1
4 TABLET, ORALLY DISINTEGRATING ORAL EVERY 30 MIN PRN
Status: DISCONTINUED | OUTPATIENT
Start: 2021-08-18 | End: 2021-08-19 | Stop reason: HOSPADM

## 2021-08-18 RX ORDER — FENTANYL CITRATE 50 UG/ML
25 INJECTION, SOLUTION INTRAMUSCULAR; INTRAVENOUS EVERY 5 MIN PRN
Status: DISCONTINUED | OUTPATIENT
Start: 2021-08-18 | End: 2021-08-18 | Stop reason: HOSPADM

## 2021-08-18 RX ORDER — OXYCODONE HCL 5 MG/5 ML
5 SOLUTION, ORAL ORAL EVERY 4 HOURS PRN
Status: DISCONTINUED | OUTPATIENT
Start: 2021-08-18 | End: 2021-08-19 | Stop reason: HOSPADM

## 2021-08-18 RX ORDER — LABETALOL HYDROCHLORIDE 5 MG/ML
5 INJECTION, SOLUTION INTRAVENOUS
Status: DISCONTINUED | OUTPATIENT
Start: 2021-08-18 | End: 2021-08-18 | Stop reason: HOSPADM

## 2021-08-18 RX ORDER — LIDOCAINE 40 MG/G
CREAM TOPICAL
Status: DISCONTINUED | OUTPATIENT
Start: 2021-08-18 | End: 2021-08-18 | Stop reason: HOSPADM

## 2021-08-18 RX ORDER — MEPERIDINE HYDROCHLORIDE 25 MG/ML
12.5 INJECTION INTRAMUSCULAR; INTRAVENOUS; SUBCUTANEOUS
Status: DISCONTINUED | OUTPATIENT
Start: 2021-08-18 | End: 2021-08-19 | Stop reason: HOSPADM

## 2021-08-18 RX ORDER — LIDOCAINE HYDROCHLORIDE 20 MG/ML
INJECTION, SOLUTION INFILTRATION; PERINEURAL PRN
Status: DISCONTINUED | OUTPATIENT
Start: 2021-08-18 | End: 2021-08-18

## 2021-08-18 RX ORDER — ONDANSETRON 4 MG/1
8 TABLET, ORALLY DISINTEGRATING ORAL EVERY 8 HOURS PRN
Qty: 15 TABLET | Refills: 0 | Status: SHIPPED | OUTPATIENT
Start: 2021-08-18 | End: 2022-04-22

## 2021-08-18 RX ORDER — FENTANYL CITRATE 50 UG/ML
INJECTION, SOLUTION INTRAMUSCULAR; INTRAVENOUS PRN
Status: DISCONTINUED | OUTPATIENT
Start: 2021-08-18 | End: 2021-08-18

## 2021-08-18 RX ORDER — PREDNISOLONE ACETATE 1 %
SUSPENSION, DROPS(FINAL DOSAGE FORM)(ML) OPHTHALMIC (EYE) PRN
Status: DISCONTINUED | OUTPATIENT
Start: 2021-08-18 | End: 2021-08-18 | Stop reason: HOSPADM

## 2021-08-18 RX ORDER — PROPOFOL 10 MG/ML
INJECTION, EMULSION INTRAVENOUS CONTINUOUS PRN
Status: DISCONTINUED | OUTPATIENT
Start: 2021-08-18 | End: 2021-08-18

## 2021-08-18 RX ORDER — BALANCED SALT SOLUTION 6.4; .75; .48; .3; 3.9; 1.7 MG/ML; MG/ML; MG/ML; MG/ML; MG/ML; MG/ML
SOLUTION OPHTHALMIC PRN
Status: DISCONTINUED | OUTPATIENT
Start: 2021-08-18 | End: 2021-08-18 | Stop reason: HOSPADM

## 2021-08-18 RX ORDER — ONDANSETRON 2 MG/ML
4 INJECTION INTRAMUSCULAR; INTRAVENOUS EVERY 30 MIN PRN
Status: DISCONTINUED | OUTPATIENT
Start: 2021-08-18 | End: 2021-08-19 | Stop reason: HOSPADM

## 2021-08-18 RX ORDER — MOXIFLOXACIN 5 MG/ML
1 SOLUTION/ DROPS OPHTHALMIC
Status: COMPLETED | OUTPATIENT
Start: 2021-08-18 | End: 2021-08-18

## 2021-08-18 RX ORDER — ONDANSETRON 2 MG/ML
INJECTION INTRAMUSCULAR; INTRAVENOUS PRN
Status: DISCONTINUED | OUTPATIENT
Start: 2021-08-18 | End: 2021-08-18

## 2021-08-18 RX ORDER — HYDROMORPHONE HYDROCHLORIDE 1 MG/ML
0.2 INJECTION, SOLUTION INTRAMUSCULAR; INTRAVENOUS; SUBCUTANEOUS EVERY 5 MIN PRN
Status: DISCONTINUED | OUTPATIENT
Start: 2021-08-18 | End: 2021-08-18 | Stop reason: HOSPADM

## 2021-08-18 RX ORDER — DICLOFENAC SODIUM 1 MG/ML
1 SOLUTION/ DROPS OPHTHALMIC
Status: COMPLETED | OUTPATIENT
Start: 2021-08-18 | End: 2021-08-18

## 2021-08-18 RX ADMIN — FENTANYL CITRATE 25 MCG: 50 INJECTION, SOLUTION INTRAMUSCULAR; INTRAVENOUS at 08:17

## 2021-08-18 RX ADMIN — DICLOFENAC SODIUM 1 DROP: 1 SOLUTION/ DROPS OPHTHALMIC at 06:51

## 2021-08-18 RX ADMIN — PROPOFOL 150 MCG/KG/MIN: 10 INJECTION, EMULSION INTRAVENOUS at 08:04

## 2021-08-18 RX ADMIN — MIDAZOLAM HYDROCHLORIDE 20 MG: 2 SYRUP ORAL at 06:49

## 2021-08-18 RX ADMIN — DEXAMETHASONE SODIUM PHOSPHATE 4 MG: 4 INJECTION, SOLUTION INTRA-ARTICULAR; INTRALESIONAL; INTRAMUSCULAR; INTRAVENOUS; SOFT TISSUE at 08:10

## 2021-08-18 RX ADMIN — Medication 650 MG: at 06:51

## 2021-08-18 RX ADMIN — ONDANSETRON 4 MG: 2 INJECTION INTRAMUSCULAR; INTRAVENOUS at 08:10

## 2021-08-18 RX ADMIN — LIDOCAINE HYDROCHLORIDE 40 MG: 20 INJECTION, SOLUTION INFILTRATION; PERINEURAL at 08:04

## 2021-08-18 RX ADMIN — MOXIFLOXACIN 1 DROP: 5 SOLUTION/ DROPS OPHTHALMIC at 06:38

## 2021-08-18 RX ADMIN — MOXIFLOXACIN 1 DROP: 5 SOLUTION/ DROPS OPHTHALMIC at 06:51

## 2021-08-18 RX ADMIN — PROPOFOL 150 MG: 10 INJECTION, EMULSION INTRAVENOUS at 08:04

## 2021-08-18 RX ADMIN — FENTANYL CITRATE 25 MCG: 50 INJECTION, SOLUTION INTRAMUSCULAR; INTRAVENOUS at 08:21

## 2021-08-18 RX ADMIN — SODIUM CHLORIDE, SODIUM LACTATE, POTASSIUM CHLORIDE, CALCIUM CHLORIDE: 600; 310; 30; 20 INJECTION, SOLUTION INTRAVENOUS at 07:14

## 2021-08-18 RX ADMIN — DICLOFENAC SODIUM 1 DROP: 1 SOLUTION/ DROPS OPHTHALMIC at 06:38

## 2021-08-18 RX ADMIN — MOXIFLOXACIN 1 DROP: 5 SOLUTION/ DROPS OPHTHALMIC at 07:02

## 2021-08-18 RX ADMIN — DICLOFENAC SODIUM 1 DROP: 1 SOLUTION/ DROPS OPHTHALMIC at 07:03

## 2021-08-18 ASSESSMENT — MIFFLIN-ST. JEOR: SCORE: 1177.98

## 2021-08-18 NOTE — BRIEF OP NOTE
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]  Low vision, both eyes [H54.3]   Post-operative diagnosis Same as above   Procedure: Procedure(s):  KERATOPLASTY, PENETRATING right eye  Exam under anesthesia eye(s)   Surgeon(s): Surgeon(s) and Role:     * Rebel Lopez MD - Primary     * Mel Dunn MD - Resident - Assisting   Estimated blood loss: * No values recorded between 8/18/2021  8:22 AM and 8/18/2021 10:03 AM *    Specimens: ID Type Source Tests Collected by Time Destination   A : Please culture Donor rim and media Tissue Donor Cornea ANAEROBIC BACTERIAL CULTURE ROUTINE, GRAM STAIN, FUNGAL OR YEAST CULTURE ROUTINE, AEROBIC BACTERIAL CULTURE ROUTINE Rebel Lopez MD 8/18/2021  8:51 AM       Findings: As expected

## 2021-08-18 NOTE — OP NOTE
Operative Report    Date of Operation: 8/18/2021  Pre-operative diagnosis:   1. Corneal graft failure, right eye  Post-operative diagnosis: Same   Procedure(s):   1. Penetrating keratoplasty, right eye  Surgeon(s): Dr. Rebel Lopez  Assistant: Dr. 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 The risks, including, but not limited to infection, loss of vision, loss of eye, need for more surgery, and bleeding, along with the benefits, alternatives, expectations, and the procedure itself were discussed at length with the patient who wished to proceed with surgery.   DESCRIPTION OF PROCEDURE   In the preoperative suite, the patient was identified, the surgical site marked and informed consent was obtained. The patient was the brought back to the operative suite where the appropriate anesthesia monitors were connected. A routine time-out was performed.The patient's right eye was then prepped and draped in the usual sterile fashion for ophthalmic surgery.  The intraocular pressure was measured with tonometry right eye 28, 31, and left eye measured 17, 19.  Exam under anesthesia was performed. Right eye with corneal opacification, there is a superior bleb that is shallow. Unable to view the anterior chamber due to corneal opacification, inferior corneal neovascularization. Left eye there is a wide eyelid fissure. The cornea appears clear, there is a shallow but formed anterior chamber with an eccentric pupil inferonasally. There is inferior corneal haze and scar with corneal neovascularization.   Following draping, a Jorge ring was sutured to the episclera using 3 4-0 vicryl sutures to the operative eye. Calipers were used to measure the cornea and the decision was made to proceed with an 6 mm corneal trephination and a 6.75 mm donor transplant.  A marking pen was used to identify the center of the cornea. An RK marker was used to russel the location for the 16 sutures.  Attention was then directed towards the donor cornea. An 6.75 mm donor suction trephine was then used to trephinate the cornea. The donor rim and media were sent for routine aerobic, anaerobic, and fungal cultures.   Attention was then directed back to the patient's cornea. An 6 mm Hartman-Hessberg corneal trephine was used to trephinate the patient's cornea to 80% depth. A pair of 0.12 forceps and a supersharp blade were used to enter the AC in a controlled fashion at 10 o'clock within the groove of the trephination. Healon was injected to fill the anterior chamber. Corneal scissors to the left and right were then used to completed excise the cornea along the trephination groove. Healon was then used to coat the open jaylan and ocular surface. The donor cornea was then placed endothelial side down over the open-jaylan. The cornea was sutured in place using 17 interrupted 10-0 nylon sutures. BSS on a 30G cannula was used to irrigate the anterior chamber and burp out residual Healon. The sutures were rotated and buried. The eye was brought to proper turger. The wound was checked and found to be watertight. The AC was noted to be formed and the pressure was noted to be adequate.  Subconjunctival injections of Ancef and Dexamethasone were administered to the inferior fornix. The Jorge ring and drapes were carefully removed. Prednisolone, ofloxacin and erythromycin ointment were placed to the operative eye. A patch and shield were taped over the eye. The patient was then taken to the recovery room in stable condition having tolerated the procedure well. The patient was discharged home in good condition. Patient is to follow-up next day at eye clinic for post-operative visit.  Dr. Rebel Lopez was scrubbed and performed the entire surgery.    Mel Dunn MD  Ophthalmology Resident,  PGY-3  [facility]   I was present for the entire procedure(s). - Rebel Lopez MD

## 2021-08-18 NOTE — ANESTHESIA CARE TRANSFER NOTE
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    Anesthesia Type:   General     Note:    Oropharynx: oropharynx clear of all foreign objects and spontaneously breathing  Level of consciousness: sleeping; resting comfortably.  Oxygen Supplementation: room air    Independent Airway: airway patency satisfactory and stable  Dentition: dentition unchanged  Vital Signs Stable: post-procedure vital signs reviewed and stable  Report to RN Given: handoff report given  Patient transferred to: PACU  Comments: Uneventful transport   Report to RN - Kendy  Exchanging well; color natl  Pt responds appropriately to command  IV patent  Lips/teeth/dentition as preop status  Questions answered      Handoff Report: Identifed the Patient, Identified the Reponsible Provider, Reviewed the pertinent medical history, Discussed the surgical course, Reviewed Intra-OP anesthesia mangement and issues during anesthesia, Set expectations for post-procedure period and Allowed opportunity for questions and acknowledgement of understanding      Vitals:  Vitals Value Taken Time   /65 08/18/21 1009   Temp 36.2  C (97.2  F) 08/18/21 1009   Pulse 80 08/18/21 1014   Resp 23 08/18/21 1014   SpO2 97 % 08/18/21 1014   Vitals shown include unvalidated device data.    Electronically Signed By: MARLEE CATES CRNA  August 18, 2021  10:15 AM

## 2021-08-18 NOTE — ANESTHESIA PREPROCEDURE EVALUATION
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]  Low vision, both eyes [H54.3]   Procedure : Procedure(s):  KERATOPLASTY, PENETRATING and possibly iridotomy, right eye  EXAM UNDER ANESTHESIA, EYE     Past Medical History:   Diagnosis Date     6p partial monosomy syndrome      Dandy-Walker syndrome (H)      Dandy-Walker syndrome (H)      Glaucoma      Immune deficiency disorder (H)      Nystagmus      Peter's anomaly      Ptosis      Strabismus       Past Surgical History:   Procedure Laterality Date     C ANESTH,CORNEAL TRANSPLANT       EXAM UNDER ANESTHESIA EYE(S) Bilateral 9/3/2020    Procedure: EXAM UNDER ANESTHESIA, BILATERAL EYE, WITH RETCAM PHOTOS AND A/B ULTRASOUND;  Surgeon: Romina Fam MD;  Location: [facility] OR     g tube removed  2002     GASTROSTOMY TUBE  2001     HERNIA REPAIR  3/2009     HERNIA REPAIR  2009     KERATOPLASTY PENETRATING      BE     PE TUBES      x 5     REPAIR VENTRICULAR SEPTAL DEFECT  2001     REVISE SHUNT VENTRICULARPERITONEAL CHILD  2001     SCRUB ETHYLENEDIAMENETETRAACETIC (EDTA) Right 9/3/2020    Procedure: 1. ETHYLENEDIAMINETETRAACETIC ACID Chelation,  2. Superficial keratectomy,  3. Placement of bandage contact lens (Kontour 20 mm),;  Surgeon: Carter Salinas MD;  Location: [facility] OR     TYMPANOPLASTY, RT/LT Left 2007     TYMPANOPLASTY, RT/LT Right 2008      Allergies   Allergen Reactions     Latex      Seasonal Allergies       Social History     Tobacco Use     Smoking status: Never Smoker     Smokeless tobacco: Never Used   Substance Use Topics     Alcohol use: Not on file      Wt Readings from Last 1 Encounters:   20 48.8 kg (107 lb 9.4 oz) (11 %, Z= -1.20)*     * Growth percentiles are based on CDC (Girls, 2-20 Years) data.        Anesthesia Evaluation            ROS/MED  HX  ENT/Pulmonary:     (+) sleep apnea,     Neurologic:       Cardiovascular:       METS/Exercise Tolerance:     Hematologic:       Musculoskeletal:       GI/Hepatic:       Renal/Genitourinary:       Endo:       Psychiatric/Substance Use:       Infectious Disease:       Malignancy:       Other:            Physical Exam    Airway      Comment: feasible         Respiratory Devices and Support         Dental           Cardiovascular             Pulmonary               Other findings: 09/03/20; 1454; Easy; Intravenous; Easy; 3.5 (airq); laryngeal mask airway; Equal, clear and bilateral; Resident    OUTSIDE LABS:  CBC:   Lab Results   Component Value Date    WBC 5.9 01/11/2006    HGB 10.9 01/11/2006    HCT 32.9 01/11/2006     01/11/2006     BMP: No results found for: NA, POTASSIUM, CHLORIDE, CO2, BUN, CR, GLC  COAGS: No results found for: PTT, INR, FIBR  POC: No results found for: BGM, HCG, HCGS  HEPATIC: No results found for: ALBUMIN, PROTTOTAL, ALT, AST, GGT, ALKPHOS, BILITOTAL, BILIDIRECT, GILBERTO  OTHER: No results found for: PH, LACT, A1C, LEONARD, PHOS, MAG, LIPASE, AMYLASE, TSH, T4, T3, CRP, SED    Anesthesia Plan    ASA Status:  3   NPO Status:  NPO Appropriate    Anesthesia Type: General.     - Airway: LMA   Induction: Intravenous, Propofol.   Maintenance: Balanced.        Consents    Anesthesia Plan(s) and associated risks, benefits, and realistic alternatives discussed. Questions answered and patient/representative(s) expressed understanding.     - Discussed with:  Patient, Parent (Mother and/or Father), Legal Guardian      - Extended Intubation/Ventilatory Support Discussed: No.      - Patient is DNR/DNI Status: No    Use of blood products discussed: No .     Postoperative Care    Pain management: IV analgesics, Oral pain medications, Multi-modal analgesia.   PONV prophylaxis: Dexamethasone or Solumedrol, Ondansetron (or other 5HT-3)     Comments:    Pre op versed oral requested     H&P reviewed: Unable to  attach H&P to encounter due to EHR limitations. H&P Update: appropriate H&P reviewed, patient examined. No interval changes since H&P (within 30 days).         Octavio Charles MD

## 2021-08-18 NOTE — DISCHARGE INSTRUCTIONS
Rebel Lopez MD  [facility]  Corneal Transplant Post-Op Instructions       Keep the eye shield and patch over the operated eye today and tonight and every night for 1 week.     You do not have to start taking eye drops today.      Keep the patch on the eye until your follow-up appointment tomorrow.      No heavy lifting (2 pound weight limit for the first month), no bending down at the waist, no water in the eye.      Take non-prescription tylenol as directed on the bottle if you have pain.      Please call [phone] and wait for the prompts to reach the on-call doctor if you develop severe pain, decreased vision, redness, or severe      sensitivity to light     Bring your eye drops to your appointment tomorrow.  After you start them be sure to wait 1 - 3 minutes between eye drops.      You have a follow-up appointment with your doctor tomorrow at the [facility].       General Reminders:     1. Bring your drops to your post op appointment.   2.  Continue any medication from your general medical doctor unless instructed otherwise.   3.  Call immediately if you have any problems or questions.  4.  Avoid heavy lifting, water in the eye andeye rubbing.            Common normal symptoms that often occur after surgery:                  a)    Scratchiness                b)    Foreign body sensation      ALERT  symptoms - call immediately should these occur ([phone])     a)    Deep aching pain with headache and/or nausea  b)    Tearing is normal but call with any pain, redness or decreased vision or discharge        [facility]  Home Care Following Anesthesia  For 24 hours after surgery:  2. Get plenty of rest.  A responsible adult must stay with you for at least 24 hours after you leave the surgery center.  3. Do not drive or use heavy equipment.  If you have weakness or tingling, don't drive or use heavy equipment until this feeling  goes away.   4. Do not drink alcohol.   5. Avoid strenuous or risky activities.  Ask for help when climbing stairs.  6. You may feel lightheaded.  IF so, sit for a few minutes before standing.  Have someone help you get up.   7. If you have nausea (feel sick to your stomach): Drink only clear liquids such as apple juice, ginger ale, broth or 7-Up.  Rest may also help.  Be sure to drink enough fluids.  Move to a regular diet as you feel able.   8. You may have a slight fever.  Call the doctor if your fever is over 100 F (37.7 C) (taken under the tongue) or lasts longer than 24 hours.  9. You may have a dry mouth, a sore throat, muscle aches or trouble sleeping. These should go away after 24 hours.  10. Do not make important or legal decisions.   11. It is recommended to avoid smoking.               Tips for taking pain medications  To get the best pain relief possible, remember these points:    Take pain medications as directed, before pain becomes severe.    Pain medication can upset your stomach: taking it with food may help.    Constipation is a common side effect of pain medication. Drink plenty of  fluids.    Eat foods high in fiber. Take a stool softener if recommended by your doctor or pharmacist.    Do not drink alcohol, drive or operate machinery while taking pain medications.    Ask about other ways to control pain, such as with heat, ice or relaxation.    Tylenol/Acetaminophen Consumption  To help encourage the safe use of acetaminophen, the makers of TYLENOL  have lowered the maximum daily dose for single-ingredient Extra Strength TYLENOL  (acetaminophen) products sold in the [place] from 8 pills per day (4,000 mg) to 6 pills per day (3,000 mg). The dosing interval has also changed from 2 pills every 4-6 hours to 2 pills every 6 hours.    If you feel your pain relief is insufficient, you may take Tylenol/Acetaminophen in addition to your narcotic pain medication.     Be careful not to exceed 3,000 mg of  Tylenol/Acetaminophen in a 24 hour period from all sources.    If you are taking extra strength Tylenol/acetaminophen (500 mg), the maximum dose is 6 tablets in 24 hours.    If you are taking regular strength acetaminophen (325 mg), the maximum dose is 9 tablets in 24 hours.    You received 650 mg at 7:00 am.  Your next available dose is available at 1:00 pm.    Call a doctor for any of the followin. Signs of infection (fever, growing tenderness at the surgery site, a large amount of drainage or bleeding, severe pain, foul-smelling drainage, redness, swelling).  2. It has been over 8 to 10 hours since surgery and you are still not able to urinate (pass water).  3. Headache for over 24 hours.  4. Signs of Covid-19 infection (temperature over 100 degrees, shortness of breath, cough, loss of taste/smell, generalized body aches, persistent headache, chills, sore throat, nausea/vomiting/diarrhea)    Your doctor is:    Dr. Rebel Lopez, Ophthalmology: [phone]  After Hours and Weekends dial: [phone] and ask for the resident on call for:  Ophthalmology  For emergency care, call the:  [facility]:  [phone] (TTY for hearing impaired: [phone])

## 2021-08-18 NOTE — ANESTHESIA POSTPROCEDURE EVALUATION
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    Anesthesia Type:  General    Note:  Disposition: Outpatient   Postop Pain Control: Uneventful            Sign Out: Well controlled pain   PONV:    Neuro/Psych: Uneventful            Sign Out: Acceptable/Baseline neuro status   Airway/Respiratory: Uneventful            Sign Out: Acceptable/Baseline resp. status   CV/Hemodynamics: Uneventful            Sign Out: Acceptable CV status; No obvious hypovolemia; No obvious fluid overload   Other NRE:    DID A NON-ROUTINE EVENT OCCUR?            Last vitals:  Vitals Value Taken Time   /68 08/18/21 1030   Temp 36.3  C (97.4  F) 08/18/21 1030   Pulse 93 08/18/21 1030   Resp 31 08/18/21 1030   SpO2 94 % 08/18/21 1030       Electronically Signed By: Octavio Charles MD  August 18, 2021  11:51 AM

## 2021-08-19 ENCOUNTER — OFFICE VISIT (OUTPATIENT)
Dept: OPHTHALMOLOGY | Facility: CLINIC | Age: 21
End: 2021-08-19
Attending: OPHTHALMOLOGY
Payer: COMMERCIAL

## 2021-08-19 DIAGNOSIS — Q15.0 CONGENITAL GLAUCOMA: ICD-10-CM

## 2021-08-19 DIAGNOSIS — H18.30 CORNEAL EPITHELIAL DEFECT: Primary | ICD-10-CM

## 2021-08-19 DIAGNOSIS — H02.203 LAGOPHTHALMOS OF EYELIDS OF BOTH EYES, UNSPECIFIED EYELID, UNSPECIFIED LAGOPHTHALMOS TYPE: ICD-10-CM

## 2021-08-19 DIAGNOSIS — Z94.7 CORNEA REPLACED BY TRANSPLANT: ICD-10-CM

## 2021-08-19 DIAGNOSIS — H02.206 LAGOPHTHALMOS OF EYELIDS OF BOTH EYES, UNSPECIFIED EYELID, UNSPECIFIED LAGOPHTHALMOS TYPE: ICD-10-CM

## 2021-08-19 DIAGNOSIS — H21.563 PUPIL IRREGULAR OF BOTH EYES: ICD-10-CM

## 2021-08-19 PROCEDURE — 99207 PR NO CHARGE LOS: CPT | Performed by: OPHTHALMOLOGY

## 2021-08-19 PROCEDURE — G0463 HOSPITAL OUTPT CLINIC VISIT: HCPCS

## 2021-08-19 PROCEDURE — 250N000009 HC RX 250: Performed by: OPHTHALMOLOGY

## 2021-08-19 RX ORDER — ERYTHROMYCIN 5 MG/G
OINTMENT OPHTHALMIC ONCE
Status: COMPLETED | OUTPATIENT
Start: 2021-08-19 | End: 2021-08-19

## 2021-08-19 RX ORDER — ERYTHROMYCIN 5 MG/G
OINTMENT OPHTHALMIC
Qty: 3.5 G | Refills: 11 | Status: SHIPPED | OUTPATIENT
Start: 2021-08-19 | End: 2022-02-21

## 2021-08-19 RX ADMIN — ERYTHROMYCIN 1 G: 5 OINTMENT OPHTHALMIC at 08:45

## 2021-08-19 ASSESSMENT — SLIT LAMP EXAM - LIDS
COMMENTS: EXOPHTHALMOS R>L
COMMENTS: EXOPHTHALMOS R>L

## 2021-08-19 ASSESSMENT — VISUAL ACUITY
OD_SC: CF@1FT
METHOD: SNELLEN - LINEAR

## 2021-08-19 ASSESSMENT — TONOMETRY
IOP_METHOD: ICARE
OD_IOP_MMHG: 28

## 2021-08-19 ASSESSMENT — EXTERNAL EXAM - LEFT EYE: OS_EXAM: PROTOSIS R>L, FAIR LID CLOSURE

## 2021-08-19 ASSESSMENT — PACHYMETRY: OD_CT(UM): 657

## 2021-08-19 NOTE — TELEPHONE ENCOUNTER
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    Patient's mother called ~ 9 PM regarding increased severe nausea for patient.     Patient's mom states that she has been vomiting a lot and she is not able to eat any food. After the surgery she did well and went home, and was doing well. Although in the early afternoon drank some water and vomited afterwards. The vomiting started around 2:30 today, although she was able to sleep afterwards and slept till around 5 PM. She tried some more water / tylenol and did ok with this, although took a bite of food at 6:45 PM and she vomited.     Mom states that she feels angry because the eye patch is on, and reports that she has some mild dizziness, although currently is resting without ambulating much. No CP, No SOB. Mom feels like her systemic symptoms are ok at this point.     She reports that the eye feels like it is scratchy, although she is not having any significant ocular pain. No throbbing. Mom doesn't think she is frankly having significant pain and feels like it is irritated as Dr. Lopez explained it might be.     We discussed that we would ideally like her to not vomit in the setting of the PKP -- thus will be vigilant with monitoring symptoms and ensure that she is not doing this.     - Advised ED visit overnight if unable to manage symptoms @ [facility] or if vomiting continues.   - Advised no strenuous activities and recommend relaxing for the rest of the night.   - Advised NPO for the rest of today  - Provided Zofran ODT to provide 8 mg as soon as able to obtain from pharmacy, and can use 4 mg overnight additionally needed.  - Follow-up with Dr. Lopez in the AM as scheduled at 8:45 AM.     Ge Hampton MD - PGY3  Department of Ophthalmology  Pager: [phone]      Interval Hx   - Patient able to tolerate PO now and mother was able to obtain the Zofran for ramone to use. Mom worried that patient now complaining of headache  "now - States patient points the the posterior aspect of her head as source of pain. Mom isn't able to discern what patient is reporting if it is just a headache vs related to the eye as well. Patient did yell in the background \"it is the head not the eye\". Mom just gave ramone 480 mg of liquid acetaminophen 5 minutes prior to this discussion ~midnight. Advised mother to keep vigilant watch of ramone's symptoms and if she complains of ivonne eye pain to be aware of that. Overall she is tolerating PO better which is reassuring and has not had any other emesis episodes. Continue plan for follow-up in A.M.     Ge Hampton MD - PGY3  Department of Ophthalmology  Pager: [phone]    Interval Hx - 6:20 AM  Patient's mom called back that she is complaining of headache and stomach ache - headache is moderate in nature as per mom - unfortunately had a more mild bout of emesis and mom was wondering about redosing zofran -- Advised another 4 mg Zofran is ok to give and if she is able to try additional amounts of tylenol that would also be ok to help. Advised to come earlier to the clinic also due to recurrence of emesis (although mom feels like the last episode was mild) to fully investigate symptoms in this setting.     Ge Hampton MD - PGY3  Department of Ophthalmology  Pager: [phone]    "

## 2021-08-19 NOTE — NURSING NOTE
Chief Complaints and History of Present Illnesses   Patient presents with     Post Op (Ophthalmology) Right Eye     POD#1 S/p KERATOPLASTY, PENETRATING right eye (8/18/2021)     Chief Complaint(s) and History of Present Illness(es)     Post Op (Ophthalmology) Right Eye     Laterality: right eye    Associated symptoms: eye pain    Pain scale: 0/10    Comments: POD#1 S/p KERATOPLASTY, PENETRATING right eye (8/18/2021)              Comments     Pt unsure of how vision RE is due to still having shield on.  Complains of having intermittent eye pain, headaches, and vomiting/nausea.  Notes no pain right now in exam room.  Has not started post op drops yet.    Sofia Thornton OT 7:48 AM August 19, 2021

## 2021-08-19 NOTE — PROGRESS NOTES
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    Interval Hx:   s/p EDTA chelation/ SK  right eye 9/3/2020. LCV  6/7/21 with Dr. Salinas. Patient is here with both parents. Eyes are feeling comfortable. They are using the eye drops below daily without problems. Would like to re-establish care with Dr. Lopez. Were discussing repeat PKP right eye at last visit with Dr. Salinas. Would like to discuss this and see whether can schedule together with EUA with Dr. Webster.        Current Ocular Meds:  Cosopt BID, OU  Alphagan BID OU  Xalatan QHS OU  FML BID OU  Refresh PM QHS PRN each eye  Artificial tears PRN each eye     Assessment and Plan:    # s/p EDTA chelation and superficial  Keratectomy right eye 9/3/20 (Dr. Salinas)    # Pressley anomaly both eyes 2000   - s/p repeat PK RE (8/18/21) (Jessica)   - S/P bilateral penetrating keratoplasty (PK) in 2000 age 12 days and 14 days    - band keratopathy inferiorly right eye now s/p EDTA chelation (See above), but still with remaining scarring.   - Vision today 3/200, IOP 10   - follows with Dr. Fam, Dr. Garcia, Dr. Webster.    - working with Dr. Garcia on scleral lens fittings. At visit 3/18/21, Dr. Garcia noted decrease in vision from 20/150 to CF, suspected early K edema -- improved to 20/200- w/ +6.00 OR that day. Per Dr. ADAN, SCL fit excellent.   - Graft with edema and KNV, not clear.   - Repeat PKP, waiting, vs Marychuy Flap vs k-pro.  Discussed timing summer vs [place] holidays to minimize missing school. Family amenable and would like to proceed.     8/18/21 PKP right eye.    PLAN:  right eye meds.   - Pred Forte QID RE  - Ofloxacin QID RE  -Emycin 6 / day  Restart glaucoma meds    # right eye regraft (PKP) 8/18/21: pt doing well. Epi defect over graft.    # [S/p patch graft 2007 for spontaneous bleb formation right eye                        - with thin bleb, but chay neg, stable, monitor              - minimize eye rubbing]     # Glaucoma, each eye                         - on xalatan, alphagan, and cosopt (timolol and dorzolamide separately now) OU                        - intraocular pressure good and stable    - established care with Eleanor, plan for EUA with Dr WESTON in near future.   -IOP increased but pt off glaucoma meds day of surgery and this am. - Will restatr glaucoma meds today..     # Lagophthalmos, each eye             - clear corneas, pt comfortable                         - continue refresh at bedtime both eyes      RTC: s/p PK RE (8/18/21) RTN POW1, call if sx worsen to clinic.    Rebel sanchez MD.      ALSO Sees:  Dr Webster

## 2021-08-19 NOTE — PATIENT INSTRUCTIONS
Continue the same eye drops plus start post-operative drops in the right eye:    Prednisolone 1 drop 4 times per day right eye  Ofloxacin 1 drop 4 times per day noah holbrook  Erythromycin ointment apply 1/4 inch amount 6 times per day right eye  Restart your glaucoma drops in right eye

## 2021-08-23 LAB — BACTERIA TISS BX CULT: NO GROWTH

## 2021-08-25 ENCOUNTER — TELEPHONE (OUTPATIENT)
Dept: OPHTHALMOLOGY | Facility: CLINIC | Age: 21
End: 2021-08-25

## 2021-08-25 LAB — BACTERIA TISS BX CULT: NORMAL

## 2021-08-26 ENCOUNTER — OFFICE VISIT (OUTPATIENT)
Dept: OPHTHALMOLOGY | Facility: CLINIC | Age: 21
End: 2021-08-26
Attending: OPHTHALMOLOGY
Payer: COMMERCIAL

## 2021-08-26 DIAGNOSIS — H21.563 PUPIL IRREGULAR OF BOTH EYES: ICD-10-CM

## 2021-08-26 DIAGNOSIS — H54.3 LOW VISION, BOTH EYES: ICD-10-CM

## 2021-08-26 DIAGNOSIS — Q15.0 CONGENITAL GLAUCOMA: ICD-10-CM

## 2021-08-26 DIAGNOSIS — H55.01 NYSTAGMUS, CONGENITAL: ICD-10-CM

## 2021-08-26 DIAGNOSIS — H02.203 LAGOPHTHALMOS OF EYELIDS OF BOTH EYES, UNSPECIFIED EYELID, UNSPECIFIED LAGOPHTHALMOS TYPE: ICD-10-CM

## 2021-08-26 DIAGNOSIS — H02.20C LAGOPHTHALMOS OF BOTH UPPER AND LOWER EYELIDS OF BOTH EYES, UNSPECIFIED LAGOPHTHALMOS TYPE: ICD-10-CM

## 2021-08-26 DIAGNOSIS — Q13.4 PETER'S ANOMALY: ICD-10-CM

## 2021-08-26 DIAGNOSIS — Z94.7 CORNEA REPLACED BY TRANSPLANT: Primary | ICD-10-CM

## 2021-08-26 DIAGNOSIS — H02.206 LAGOPHTHALMOS OF EYELIDS OF BOTH EYES, UNSPECIFIED EYELID, UNSPECIFIED LAGOPHTHALMOS TYPE: ICD-10-CM

## 2021-08-26 DIAGNOSIS — H53.143 PHOTOPHOBIA OF BOTH EYES: ICD-10-CM

## 2021-08-26 PROCEDURE — 99207 PR NO CHARGE LOS: CPT | Performed by: OPHTHALMOLOGY

## 2021-08-26 PROCEDURE — G0463 HOSPITAL OUTPT CLINIC VISIT: HCPCS

## 2021-08-26 ASSESSMENT — PACHYMETRY: OD_CT(UM): 657

## 2021-08-26 ASSESSMENT — VISUAL ACUITY
OD_SC: 20/300
OS_SC+: -2
OS_SC: 20/50
METHOD: SNELLEN - LINEAR

## 2021-08-26 ASSESSMENT — SLIT LAMP EXAM - LIDS
COMMENTS: EXOPHTHALMOS R>L
COMMENTS: EXOPHTHALMOS R>L

## 2021-08-26 ASSESSMENT — TONOMETRY
OD_IOP_MMHG: 03
OS_IOP_MMHG: 07
IOP_METHOD: ICARE

## 2021-08-26 ASSESSMENT — EXTERNAL EXAM - LEFT EYE: OS_EXAM: PROTOSIS R>L, FAIR LID CLOSURE

## 2021-08-26 NOTE — NURSING NOTE
Chief Complaints and History of Present Illnesses   Patient presents with     Post Op (Ophthalmology) Right Eye     Chief Complaint(s) and History of Present Illness(es)     Post Op (Ophthalmology) Right Eye     Laterality: right eye    Onset: 1 week ago              Comments     1 week s/p PK RE 08/18/2021-Pt. States that she is doing well. VA is improved RE. Did have pain 1 night but no longer having any pain.   Jil Coker COT 7:49 AM August 26, 2021

## 2021-08-26 NOTE — PROGRESS NOTES
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     Interval Hx:   s/p EDTA chelation/ SK  right eye 9/3/2020. LCV  6/7/21 with Dr. Salinas. Patient is here with both parents. Eyes are feeling comfortable. They are using the eye drops below daily without problems. Would like to re-establish care with Dr. Lopez. Were discussing repeat PKP right eye at last visit with Dr. Salinas. Would like to discuss this and see whether can schedule together with EUA with Dr. Webster.         Current Ocular Meds:  Cosopt BID, OU  Alphagan BID OU  Xalatan QHS OU  FML BID OU  Refresh PM QHS PRN each eye  Artificial tears PRN each eye      Assessment and Plan:     # s/p EDTA chelation and superficial  Keratectomy right eye 9/3/20 (Dr. Salinas)     # Pressley anomaly both eyes 2000               - s/p repeat PK RE (8/18/21) (Jessica)               - S/P bilateral penetrating keratoplasty (PK) in 2000 age 12 days and 14 days                - band keratopathy inferiorly right eye now s/p EDTA chelation (See above), but still with remaining scarring.               - Vision today 3/200, IOP 10               - follows with Dr. Fam, Dr. Garcia, Dr. Webster.                - working with Dr. Garcia on scleral lens fittings. At visit 3/18/21, Dr. Garcia noted decrease in vision from 20/150 to CF, suspected early K edema -- improved to 20/200- w/ +6.00 OR that day. Per Dr. ADAN, SCL fit excellent.               - Graft with edema and KNV, not clear.               - Repeat PKP, waiting, vs Marychuy Flap vs k-pro.  Discussed timing summer vs [place] holidays to minimize missing school. Family amenable and would like to proceed.      8/18/21 PKP right eye.    8/26/21: Post right eye PKP 8/18/21. Epi defect healing 40%. Will continue tx. Disc possible need for bandage CL.     PLAN:  right eye meds.   - Pred Forte QID RE  - Ofloxacin QID RE  -Emycin 6 / day  Restart glaucoma  meds     # right eye regraft (PKP) 8/18/21: pt doing well. Epi defect over graft.     # [S/p patch graft 2007 for spontaneous bleb formation right eye                        - with thin bleb, but chay neg, stable, monitor                         - minimize eye rubbing]     # Glaucoma, each eye                         - on xalatan, alphagan, and cosopt (timolol and dorzolamide separately now) OU                        - intraocular pressure good and stable                            - established care with Eleanor, plan for EUA with Dr WESTON in near future.               -IOP increased but pt off glaucoma meds day of surgery and this am. - Will restatr glaucoma meds today..      # Lagophthalmos, each eye                         - clear corneas, pt comfortable                         - continue refresh at bedtime both eyes        RTC:  RTN 1 week, call if sx worsen to clinic.     Rebel sanchez MD.

## 2021-09-03 ENCOUNTER — OFFICE VISIT (OUTPATIENT)
Dept: OPHTHALMOLOGY | Facility: CLINIC | Age: 21
End: 2021-09-03
Attending: OPHTHALMOLOGY
Payer: COMMERCIAL

## 2021-09-03 DIAGNOSIS — Z94.7 CORNEA REPLACED BY TRANSPLANT: Primary | ICD-10-CM

## 2021-09-03 DIAGNOSIS — H55.01 NYSTAGMUS, CONGENITAL: ICD-10-CM

## 2021-09-03 DIAGNOSIS — Q15.0 CONGENITAL GLAUCOMA: ICD-10-CM

## 2021-09-03 DIAGNOSIS — H54.3 LOW VISION, BOTH EYES: ICD-10-CM

## 2021-09-03 DIAGNOSIS — Q13.4 PETER'S ANOMALY: ICD-10-CM

## 2021-09-03 DIAGNOSIS — H21.563 PUPIL IRREGULAR OF BOTH EYES: ICD-10-CM

## 2021-09-03 PROCEDURE — 999N000103 HC STATISTIC NO CHARGE FACILITY FEE

## 2021-09-03 PROCEDURE — 99024 POSTOP FOLLOW-UP VISIT: CPT | Mod: GC | Performed by: OPHTHALMOLOGY

## 2021-09-03 PROCEDURE — G0463 HOSPITAL OUTPT CLINIC VISIT: HCPCS

## 2021-09-03 PROCEDURE — 99212 OFFICE O/P EST SF 10 MIN: CPT | Mod: 24 | Performed by: OPHTHALMOLOGY

## 2021-09-03 ASSESSMENT — CONF VISUAL FIELD
OD_INFERIOR_TEMPORAL_RESTRICTION: 1
OD_SUPERIOR_NASAL_RESTRICTION: 3
OS_INFERIOR_TEMPORAL_RESTRICTION: 3
OS_SUPERIOR_TEMPORAL_RESTRICTION: 3
OD_INFERIOR_TEMPORAL_RESTRICTION: 1
OD_SUPERIOR_NASAL_RESTRICTION: 3
OS_INFERIOR_NASAL_RESTRICTION: 3
OS_INFERIOR_NASAL_RESTRICTION: 3
OD_SUPERIOR_TEMPORAL_RESTRICTION: 3
OD_INFERIOR_NASAL_RESTRICTION: 1
OS_INFERIOR_TEMPORAL_RESTRICTION: 3
OS_SUPERIOR_TEMPORAL_RESTRICTION: 3
OS_SUPERIOR_NASAL_RESTRICTION: 3
OS_SUPERIOR_NASAL_RESTRICTION: 3
OD_SUPERIOR_TEMPORAL_RESTRICTION: 3
OD_INFERIOR_NASAL_RESTRICTION: 1

## 2021-09-03 ASSESSMENT — TONOMETRY
OD_IOP_MMHG: 07
IOP_METHOD: ICARE
OD_IOP_MMHG: 07
OS_IOP_MMHG: 10
IOP_METHOD: ICARE
OS_IOP_MMHG: 10

## 2021-09-03 ASSESSMENT — VISUAL ACUITY
METHOD: SNELLEN - LINEAR
OS_CC: 20/70
OD_SC: 5/200 E
OS_CC: 20/70
METHOD: SNELLEN - LINEAR
OD_SC: 5/200 E
CORRECTION_TYPE: GLASSES
CORRECTION_TYPE: GLASSES

## 2021-09-03 ASSESSMENT — REFRACTION_WEARINGRX
OS_CYLINDER: +3.00
OS_SPHERE: -6.50
OS_SPHERE: -6.50
OD_SPHERE: -3.50
OS_AXIS: 120
OD_CYLINDER: SPHERE
OS_CYLINDER: +3.00
OD_CYLINDER: SPHERE
OS_AXIS: 120
OD_SPHERE: -3.50

## 2021-09-03 ASSESSMENT — SLIT LAMP EXAM - LIDS
COMMENTS: EXOPHTHALMOS R>L

## 2021-09-03 ASSESSMENT — PACHYMETRY: OD_CT(UM): 657

## 2021-09-03 ASSESSMENT — EXTERNAL EXAM - LEFT EYE
OS_EXAM: PROTOSIS R>L, FAIR LID CLOSURE
OS_EXAM: PROTOSIS R>L, FAIR LID CLOSURE

## 2021-09-03 NOTE — NURSING NOTE
Chief Complaints and History of Present Illnesses   Patient presents with     Glaucoma Follow Up     4 month follow up      Chief Complaint(s) and History of Present Illness(es)     Glaucoma Follow Up     Comments: 4 month follow up               Comments     Per mom, no changes in vision. No eye pain.  Still having redness in RE, improving slowly.  Yellowish discharge from RE in the morning.    HALIMA Edwards September 3, 2021 7:41 AM

## 2021-09-03 NOTE — PROGRESS NOTES
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 Reyna for glaucoma management     Chief Complaint/Presenting Concern: Here for glaucoma evlauation    History of Present Illness:   Ameena Solorzano is a 20 year old patient who presents for evaluation of glaucoma. She is s/p repeat PK in the right eye 8/18/21. As per Dr. Lopez last visit, graft has edema and KNV and is not clear - There is a plan for possible repeat PKP, waiting or providing a K-pro or nasrin flap. Patient was not using glaucoma medications and it was restarted during visit 8/26/2021. Today she is not in pain and states that things are going well. Using glaucoma gtts very appropriately - on alphagan, cosopt, xalatan each eye.     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 Pressley anomaly both eyes  - S/P bilateral penetrating keratoplasty (PK) as an infant (2000), with revision  - S/p EDTA chelation right eye 9/3/2020.  Over the interval, working with Dr. Garcia on scleral lens fittings.  - s/p  repeat PK, right eye 8/18/21 - with edema and KNV   - Using PF QID right eye   - Follows w/ Dr. Fam and Dr. Salinas  - Dr. Salinas may consider repeat PKP vs KPro vs nasrin flap vs waiting     3. 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 Congenital nystagmus    5. Dandy Walker Syndrome    6. Exotropia of right eye  - Stable  - Following with Dr. Fam     Plan/Recommendations:    Discussed findings with patient.    Difficult to tell if there is any progression in glaucoma given limited information on ONH status. Recommended EUA repeat to compare ONH photos to latest images performed by Dr. Fam during EUA on 09/03/2020. Will try to retrieve ONH imaging from the time of EUA at the time of Dr. Reyna.     Will plan for the EUA after the right eye cornea heals     Continue Xalatan at bedtime each eye     Continue Alphagan twice a day each eye     Continue Cosopt twice a day each eye     Continue FML BID, left eye     Continue Pred Forte QID right eye     Continue Ofloxacin QID right eye    Continue Emycin 6x/ day right eye     RTC in 2 months VA, IOP     Ge Hampton MD - PGY3  Department of Ophthalmology  Pager: [phone]    Physician Attestation     Attending Physician Attestation:  Complete documentation of historical and exam elements from today's encounter can be found in the full encounter summary report (not reduplicated in this progress note). I personally obtained the chief complaint(s) and history of present illness. I confirmed and edited as necessary the review of systems, past medical/surgical history, family history, social history, and examination findings as documented by others; and I examined the patient myself. I personally reviewed the relevant tests, images, and reports as documented above. I formulated and edited as necessary the assessment and plan and discussed the findings and management plan with the patient and any family members present at the time of the  visit.  Jono Webster M.D., Glaucoma, 09/03/21

## 2021-09-03 NOTE — NURSING NOTE
Chief Complaints and History of Present Illnesses   Patient presents with     Follow Up     1 week follow up s/p EDTA chelation and superficial  Keratectomy right eye 9/3/20 (Dr. Salinas)     Chief Complaint(s) and History of Present Illness(es)     Follow Up     Comments: 1 week follow up s/p EDTA chelation and superficial  Keratectomy right eye 9/3/20 (Dr. Salinas)              Comments     Per mom, no changes in vision. No eye pain.  Still having redness in RE, improving slowly.  Yellowish discharge from RE in the morning.    [facility] Elvin  [facility] September 3, 2021 7:41 AM

## 2021-09-03 NOTE — PROGRESS NOTES
CC: s/p PK Re (8/18/21)     HPI: 21 yo F w/ Hx of pressley anomaly s/p PK BE, with history of elevated eye pressures.     Interval Hx:   s/p EDTA chelation/ SK  right eye 9/3/2020. LCV  6/7/21 with Dr. Salinas. S/p PK right eye 8/18/21.  She is here for 2 week follow up.  Patient is here with both mother. Eyes are feeling comfortable. They are using the eye drops below daily without problems.        Current Ocular Meds:  Cosopt BID, OU  Alphagan BID OU  Xalatan QHS OU  FML BID each eye (only needs it in the left eye while on Pred acetate)  Refresh PM QHS PRN each eye  Artificial tears PRN each eye     Right eye specific gtts;  Oflox QID  Pred forte QID  Erythromycin ointment 6 times daily      Assessment and Plan:     # s/p EDTA chelation and superficial  Keratectomy right eye 9/3/20 (Dr. Salinas)     # Pressley anomaly both eyes 2000               - s/p repeat PK RE (8/18/21) (Jessica)               - S/P bilateral penetrating keratoplasty (PK) in 2000 age 12 days and 14 days                - band keratopathy inferiorly right eye now s/p EDTA chelation (See above), but still with remaining scarring.               - Vision today 5/200, IOP 7               - follows with Dr. Fam, Dr. Garcia, Dr. Webster.                - working with Dr. Garcia on scleral lens fittings. At visit 3/18/21, Dr. Garcia noted decrease in vision from 20/150 to CF, suspected early K edema -- improved to 20/200- w/ +6.00 OR that day. Per Dr. ADAN, SCL fit excellent.               - Graft with edema and KNV, not clear.     8/18/21 PKP right eye.    8/26/21: Post right eye PKP 8/18/21. Epi defect healing 40%. Will continue tx. Disc possible need for bandage CL.    9/3/21. 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 Will continue meds (stop right eye FML). Pt to continue shield at night with glasses during day.     PLAN:  right eye meds.   - Pred Forte QID RE  - Ofloxacin QID RE  -Emycin 6 / day  Restart glaucoma meds  Stop FML OD     # right  eye regraft (PKP) 8/18/21: pt doing well. Epi defect over graft.     # [S/p patch graft 2007 for spontaneous bleb formation right eye                        - with thin bleb, but chay neg, stable, monitor                         - minimize eye rubbing]     # Glaucoma, each eye                         - on xalatan, alphagan, and cosopt (timolol and dorzolamide separately now) OU                        - intraocular pressure good and stable                            - established care with Eleanor, plan for EUA with Dr WESTON in near future.               -IOP increased but pt off glaucoma meds day of surgery and this am. - Will restatr glaucoma meds today..      # Lagophthalmos, each eye                         - clear corneas, pt comfortable                         - continue refresh at bedtime both eyes        RTC:  RTN 1 week, call if sx worsen to clinic.     Rigo Pascual,   Fellow, Cornea & External Disease  Department of Ophthalmology  [facility]    Attending Physician Attestation:  Complete documentation of historical and exam elements from today's encounter can be found in the full encounter summary report (not reduplicated in this progress note).  I personally obtained the chief complaint(s) and history of present illness.  I confirmed and edited as necessary the review of systems, past medical/surgical history, family history, social history, and examination findings as documented by others; and I examined the patient myself.  I personally reviewed the relevant tests, images, and reports as documented above.  I formulated and edited as necessary the assessment and plan and discussed the findings and management plan with the patient and family. - Rebel Lopez MD

## 2021-09-03 NOTE — PATIENT INSTRUCTIONS
Xalatan at bedtime each eye     Alphagan twice a day each eye     Cosopt twice a day each eye     Prednisolone 1 drop 4 times per day right eye    Ofloxacin 1 drop 4 times per day right eye    Erythromycin ointment apply 1/4 inch amount 6 times per day right eye    Fluorometholone twice a day left eye

## 2021-09-07 ENCOUNTER — MYC MEDICAL ADVICE (OUTPATIENT)
Dept: OPHTHALMOLOGY | Facility: CLINIC | Age: 21
End: 2021-09-07

## 2021-09-07 ENCOUNTER — TELEPHONE (OUTPATIENT)
Dept: OPHTHALMOLOGY | Facility: CLINIC | Age: 21
End: 2021-09-07

## 2021-09-07 DIAGNOSIS — Z94.7 CORNEA REPLACED BY TRANSPLANT: ICD-10-CM

## 2021-09-07 RX ORDER — PREDNISOLONE ACETATE 10 MG/ML
1 SUSPENSION/ DROPS OPHTHALMIC 4 TIMES DAILY
Qty: 5 ML | Refills: 2 | Status: SHIPPED | OUTPATIENT
Start: 2021-09-07 | End: 2022-02-15

## 2021-09-07 RX ORDER — OFLOXACIN 3 MG/ML
1 SOLUTION/ DROPS OPHTHALMIC 4 TIMES DAILY
Qty: 5 ML | Refills: 1 | Status: SHIPPED | OUTPATIENT
Start: 2021-09-07 | End: 2021-11-04

## 2021-09-07 NOTE — TELEPHONE ENCOUNTER
Rx's resent under Dr. Jessica Hightower, RN 5:10 PM 09/07/21         Health Call Center    Phone Message    May a detailed message be left on voicemail: yes     Reason for Call: Other: Lisa from [facility] is calling because the medication that you prescribed the Pt is not covered by insurance due to you being a student. Lisa would like to know if you can get another provider to sign off on them.      Action Taken: Message routed to:  [facility] EYE    Travel Screening: Not Applicable

## 2021-09-07 NOTE — TELEPHONE ENCOUNTER
Duplicate request to another eye provider    Rx's were sent in other [employer] message and updated pt in response in [facility]    Luke Hightower RN 4:47 PM 09/07/21

## 2021-09-09 ENCOUNTER — TELEPHONE (OUTPATIENT)
Dept: OPHTHALMOLOGY | Facility: CLINIC | Age: 21
End: 2021-09-09

## 2021-09-09 NOTE — TELEPHONE ENCOUNTER
LVM for pt father that we will need to RS this appt as provider is in an all day conferance teaching to either 9/14/21 OR 9/21/21 - ts    Jerrica Figueroa, COA 8:56 AM September 9, 2021

## 2021-09-15 LAB — BACTERIA TISS BX CULT: NO GROWTH

## 2021-09-18 ENCOUNTER — HEALTH MAINTENANCE LETTER (OUTPATIENT)
Age: 21
End: 2021-09-18

## 2021-09-21 ENCOUNTER — OFFICE VISIT (OUTPATIENT)
Dept: OPHTHALMOLOGY | Facility: CLINIC | Age: 21
End: 2021-09-21
Attending: OPHTHALMOLOGY
Payer: COMMERCIAL

## 2021-09-21 DIAGNOSIS — H02.206 LAGOPHTHALMOS OF EYELIDS OF BOTH EYES, UNSPECIFIED EYELID, UNSPECIFIED LAGOPHTHALMOS TYPE: ICD-10-CM

## 2021-09-21 DIAGNOSIS — Q15.0 CONGENITAL GLAUCOMA: ICD-10-CM

## 2021-09-21 DIAGNOSIS — Z94.7 CORNEA REPLACED BY TRANSPLANT: Primary | ICD-10-CM

## 2021-09-21 DIAGNOSIS — H04.129 DRY EYE: ICD-10-CM

## 2021-09-21 DIAGNOSIS — H55.01 NYSTAGMUS, CONGENITAL: ICD-10-CM

## 2021-09-21 DIAGNOSIS — Z98.890 POSTSURGICAL STATES FOLLOWING SURGERY OF EYE AND ADNEXA: ICD-10-CM

## 2021-09-21 DIAGNOSIS — H54.3 LOW VISION, BOTH EYES: ICD-10-CM

## 2021-09-21 DIAGNOSIS — H02.203 LAGOPHTHALMOS OF EYELIDS OF BOTH EYES, UNSPECIFIED EYELID, UNSPECIFIED LAGOPHTHALMOS TYPE: ICD-10-CM

## 2021-09-21 DIAGNOSIS — Q13.4 PETER'S ANOMALY: ICD-10-CM

## 2021-09-21 DIAGNOSIS — H21.563 PUPIL IRREGULAR OF BOTH EYES: ICD-10-CM

## 2021-09-21 PROCEDURE — 99024 POSTOP FOLLOW-UP VISIT: CPT | Mod: GC | Performed by: OPHTHALMOLOGY

## 2021-09-21 PROCEDURE — G0463 HOSPITAL OUTPT CLINIC VISIT: HCPCS

## 2021-09-21 ASSESSMENT — TONOMETRY
OD_IOP_MMHG: 09
IOP_METHOD: TONOPEN
OS_IOP_MMHG: 10

## 2021-09-21 ASSESSMENT — SLIT LAMP EXAM - LIDS
COMMENTS: EXOPHTHALMOS R>L
COMMENTS: EXOPHTHALMOS R>L

## 2021-09-21 ASSESSMENT — CONF VISUAL FIELD
OS_INFERIOR_TEMPORAL_RESTRICTION: 3
OS_SUPERIOR_TEMPORAL_RESTRICTION: 3
OS_SUPERIOR_NASAL_RESTRICTION: 3
OD_INFERIOR_NASAL_RESTRICTION: 3
OD_SUPERIOR_TEMPORAL_RESTRICTION: 3
OD_SUPERIOR_NASAL_RESTRICTION: 3
OS_INFERIOR_NASAL_RESTRICTION: 3

## 2021-09-21 ASSESSMENT — VISUAL ACUITY
CORRECTION_TYPE: GLASSES
OD_CC: 20/500
OS_CC: 20/60
METHOD: SNELLEN - LINEAR
OS_CC+: -1

## 2021-09-21 ASSESSMENT — PACHYMETRY: OD_CT(UM): 657

## 2021-09-21 ASSESSMENT — EXTERNAL EXAM - LEFT EYE: OS_EXAM: PROTOSIS R>L, FAIR LID CLOSURE

## 2021-09-21 NOTE — NURSING NOTE
Chief Complaints and History of Present Illnesses   Patient presents with     Follow Up     Chief Complaint(s) and History of Present Illness(es)     Follow Up     Laterality: right eye    Course: gradually improving    Associated symptoms: Negative for eye pain, headache, itching, floaters and flashes              Comments     Pt here for 1 month follow up on s/p EDTA chelation/ SK  right eye 9/3/2020.  S/p PK right eye 8/18/21. Pts father would like to know if the post op restrictions can be lifted.  Pt using:  Cosopt BID, OU  Alphagan BID OU  Xalatan QHS OU  FML BID each eye (only needs it in the left eye while on Pred acetate)  Refresh PM QHS PRN each eye  Artificial tears PRN each eye     right eye only drops:  Oflox QID  Pred forte QID  Erythromycin ointment 6 times daily     KAUSHAL HA 2:52 PM September 21, 2021

## 2021-09-21 NOTE — PROGRESS NOTES
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     Interval Hx:   s/p EDTA chelation/ SK  right eye 9/3/2020. LCV  6/7/21 with Dr. Salinas. S/p PK right eye 8/18/21.  She is here for 2 week follow up.  Patient is here with both mother. Eyes are feeling comfortable. They are using the eye drops below daily without problems.        Current Ocular Meds:  Cosopt BID, OU  Alphagan BID OU  Xalatan QHS OU  FML BID each eye (only needs it in the left eye while on Pred acetate)  Refresh PM QHS PRN each eye  Artificial tears PRN each eye      Right eye specific gtts;  Oflox QID  Pred forte QID  Erythromycin ointment 6 times daily      Assessment and Plan:     # s/p EDTA chelation and superficial  Keratectomy right eye 9/3/20 (Dr. Salinas)     # Pressley anomaly both eyes 2000               - s/p repeat PK RE (8/18/21) (Jessica)               - S/P bilateral penetrating keratoplasty (PK) in 2000 age 12 days and 14 days                - band keratopathy inferiorly right eye now s/p EDTA chelation (See above), but still with remaining scarring.               - Vision today 5/200, IOP 7               - follows with Dr. Fam, Dr. Garcia, Dr. Webster.                - working with Dr. Garcia on scleral lens fittings. At visit 3/18/21, Dr. Garcia noted decrease in vision from 20/150 to CF, suspected early K edema -- improved to 20/200- w/ +6.00 OR that day. Per Dr. ADAN, SCL fit excellent.               - Graft with edema and KNV, not clear.     8/18/21 PKP right eye.     8/26/21: Post right eye PKP 8/18/21. Epi defect healing 40%. Will continue tx. Disc possible need for bandage CL.     9/3/21. 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 Will continue meds (stop right eye FML). Pt to continue shield at night with glasses during day.     PLAN:  right eye meds.   - Pred Forte QID RE  - Ofloxacin QID RE  -Emycin 6 / day  Restart glaucoma meds  Stop FML OD     #  right eye regraft (PKP) 8/18/21: pt doing well. No Epi defect over graft. 0.25mm spuerior temproal epi defect on host rim. Will continue jess .     # [S/p patch graft 2007 for spontaneous bleb formation right eye                        - with thin bleb, but chay neg, stable, monitor                         - minimize eye rubbing]     # Glaucoma, each eye                         - on xalatan, alphagan, and cosopt (timolol and dorzolamide separately now) OU                        - intraocular pressure good and stable                            - established care with Eleanor, plan for EUA with Dr WESTON in near future.               -IOP increased but pt off glaucoma meds day of surgery and this am. - Will restatr glaucoma meds today..      # Lagophthalmos, each eye                         - clear corneas, pt comfortable                         - continue refresh at bedtime both eyes        RTC:  RTN 2 week, call if sx worsen to clinic.    Rebel sanchez MD, PhD    Attending Physician Attestation:  Complete documentation of historical and exam elements from today's encounter can be found in the full encounter summary report (not reduplicated in this progress note).  I personally obtained the chief complaint(s) and history of present illness.  I confirmed and edited as necessary the review of systems, past medical/surgical history, family history, social history, and examination findings as documented by others; and I examined the patient myself.  I personally reviewed the relevant tests, images, and reports as documented above.  I formulated and edited as necessary the assessment and plan and discussed the findings and management plan with the patient and family. - Rebel Sanchez MD

## 2021-10-07 ENCOUNTER — OFFICE VISIT (OUTPATIENT)
Dept: OPHTHALMOLOGY | Facility: CLINIC | Age: 21
End: 2021-10-07
Attending: OPHTHALMOLOGY
Payer: COMMERCIAL

## 2021-10-07 DIAGNOSIS — Z94.7 CORNEA REPLACED BY TRANSPLANT: ICD-10-CM

## 2021-10-07 DIAGNOSIS — H02.206 LAGOPHTHALMOS OF EYELIDS OF BOTH EYES, UNSPECIFIED EYELID, UNSPECIFIED LAGOPHTHALMOS TYPE: ICD-10-CM

## 2021-10-07 DIAGNOSIS — Z98.890 POSTSURGICAL STATES FOLLOWING SURGERY OF EYE AND ADNEXA: Primary | ICD-10-CM

## 2021-10-07 DIAGNOSIS — H55.01 NYSTAGMUS, CONGENITAL: ICD-10-CM

## 2021-10-07 DIAGNOSIS — H04.129 DRY EYE: ICD-10-CM

## 2021-10-07 DIAGNOSIS — Q13.4 PETER'S ANOMALY: ICD-10-CM

## 2021-10-07 DIAGNOSIS — H02.203 LAGOPHTHALMOS OF EYELIDS OF BOTH EYES, UNSPECIFIED EYELID, UNSPECIFIED LAGOPHTHALMOS TYPE: ICD-10-CM

## 2021-10-07 PROCEDURE — G0463 HOSPITAL OUTPT CLINIC VISIT: HCPCS

## 2021-10-07 PROCEDURE — 99024 POSTOP FOLLOW-UP VISIT: CPT | Mod: GC | Performed by: OPHTHALMOLOGY

## 2021-10-07 ASSESSMENT — TONOMETRY
OD_IOP_MMHG: 12
IOP_METHOD: ICARE
OS_IOP_MMHG: 11

## 2021-10-07 ASSESSMENT — VISUAL ACUITY
OS_CC+: -2
METHOD: SNELLEN - LINEAR
OD_CC: 20/500
CORRECTION_TYPE: GLASSES
OS_CC: 20/60

## 2021-10-07 ASSESSMENT — PACHYMETRY: OD_CT(UM): 657

## 2021-10-07 ASSESSMENT — SLIT LAMP EXAM - LIDS
COMMENTS: EXOPHTHALMOS R>L
COMMENTS: EXOPHTHALMOS R>L

## 2021-10-07 ASSESSMENT — EXTERNAL EXAM - LEFT EYE: OS_EXAM: PROTOSIS R>L, FAIR LID CLOSURE

## 2021-10-07 NOTE — NURSING NOTE
Chief Complaints and History of Present Illnesses   Patient presents with     Post Op (Ophthalmology) Right Eye     Chief Complaint(s) and History of Present Illness(es)     Post Op (Ophthalmology) Right Eye     Laterality: right eye    Onset: 2 weeks ago              Comments     s/p repeat PK RE (8/18/21) (Jessica)Pt. States that she is doing well. No change in VA BE. No pain BE.   Jil Coker COT 7:57 AM October 7, 2021

## 2021-10-07 NOTE — PROGRESS NOTES
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     Interval Hx:   s/p EDTA chelation/ SK  right eye 9/3/2020. LCV  6/7/21 with Dr. Salinas. S/p PK right eye 8/18/21.  She is here for 2 month post op visit. Patient is here with father. No eye pain. No change in vision. They are using the eye drops below daily without problems.        Current Ocular Meds:  Cosopt BID, OU  Alphagan BID OU  Xalatan QHS OU  FML BID left eye only  Refresh PM QHS PRN each eye  Artificial tears PRN each eye      Right eye specific gtts;  Oflox QID  Pred forte QID  Erythromycin ointment 6 times daily      Assessment and Plan:    # s/p EDTA chelation and superficial  Keratectomy right eye 9/3/20 (Dr. Salinas)     # Pressley anomaly both eyes 2000               - s/p repeat PK RE (8/18/21) (Jessica)               - S/P bilateral penetrating keratoplasty (PK) in 2000 age 12 days and 14 days                - band keratopathy inferiorly right eye now s/p EDTA chelation (See above), but still with remaining scarring.               - Vision today 5/200, IOP 12               - follows with Dr. Fam, Dr. Garcia, Dr. Webster.                - working with Dr. Garcia on scleral lens fittings. At visit 3/18/21, Dr. Garcia noted decrease in vision from 20/150 to CF, suspected early K edema -- improved to 20/200- w/ +6.00 OR that day. Per Dr. ADAN, SCL fit excellent.               - Graft with edema and KNV, not clear.     8/18/21 PKP right eye.     8/26/21: Post right eye PKP 8/18/21. Epi defect healing 40%. Will continue tx. Disc possible need for bandage CL.     9/3/21. 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 Will continue meds (stop right eye FML). Pt to continue shield at night with glasses during day.     PLAN:  right eye meds.   - Pred Forte QID RE  - Ofloxacin QID RE  - Emycin 6 / day  - Cont. glaucoma meds  - Stopped FML right eye (since 9/3/2021)  - wear shield  at night.     # right eye regraft (PKP) 8/18/21: pt doing well. No Epi defect over graft. 0.25mm spuerior temproal epi defect on host rim. Will continue jess .     # [S/p patch graft 2007 for spontaneous bleb formation right eye                        - with thin bleb, but chay neg, stable, monitor                         - minimize eye rubbing]     # Glaucoma, each eye                         - on xalatan, alphagan, and cosopt (timolol and dorzolamide separately now) OU                        - intraocular pressure good and stable                            - established care with Eleanor, plan for EUA with Dr WESTON in near future.               -IOP increased but pt off glaucoma meds day of surgery and this am. - Will restatr glaucoma meds today..      # Lagophthalmos, each eye                         - clear corneas, pt comfortable                         - continue refresh at bedtime both eyes        RTC:  rec EUA to remove 9 OC loose suture next week, call if sx worsen to clinic.  Then rtc 1 month.    Mariluz Davis MD  PGY3     Attending Physician Attestation:  Complete documentation of historical and exam elements from today's encounter can be found in the full encounter summary report (not reduplicated in this progress note).  I personally obtained the chief complaint(s) and history of present illness.  I confirmed and edited as necessary the review of systems, past medical/surgical history, family history, social history, and examination findings as documented by others; and I examined the patient myself.  I personally reviewed the relevant tests, images, and reports as documented above.  I formulated and edited as necessary the assessment and plan and discussed the findings and management plan with the patient and family. - Rebel Lopez MD

## 2021-10-08 ENCOUNTER — TELEPHONE (OUTPATIENT)
Dept: OPHTHALMOLOGY | Facility: CLINIC | Age: 21
End: 2021-10-08
Payer: COMMERCIAL

## 2021-10-08 DIAGNOSIS — Z11.59 ENCOUNTER FOR SCREENING FOR OTHER VIRAL DISEASES: ICD-10-CM

## 2021-10-08 NOTE — TELEPHONE ENCOUNTER
Patient is scheduled for surgery with Dr. Rebel Lopez     Spoke with: Kalpana     Date of Surgery: 10/13     Location: [facility], [facility] [place]:  [address] [place] [id number]     Informed patient they will need an adult : Yes     H&P will be completed at: [facility]     COVID testing: [facility]    Post Op scheduled on 11/02     Surgery packet was not needed     Additional comments: Advised RN will call 1 - 2 business days prior with arrival time and instructions.

## 2021-10-08 NOTE — TELEPHONE ENCOUNTER
I called patient to schedule surgery with Dr. Rebel Lopez, I left a voicemail with callback # [phone]

## 2021-10-11 ENCOUNTER — LAB (OUTPATIENT)
Dept: LAB | Facility: CLINIC | Age: 21
End: 2021-10-11
Attending: OPHTHALMOLOGY
Payer: COMMERCIAL

## 2021-10-11 DIAGNOSIS — Z11.59 ENCOUNTER FOR SCREENING FOR OTHER VIRAL DISEASES: ICD-10-CM

## 2021-10-11 LAB — SARS-COV-2 RNA RESP QL NAA+PROBE: NEGATIVE

## 2021-10-11 PROCEDURE — U0003 INFECTIOUS AGENT DETECTION BY NUCLEIC ACID (DNA OR RNA); SEVERE ACUTE RESPIRATORY SYNDROME CORONAVIRUS 2 (SARS-COV-2) (CORONAVIRUS DISEASE [COVID-19]), AMPLIFIED PROBE TECHNIQUE, MAKING USE OF HIGH THROUGHPUT TECHNOLOGIES AS DESCRIBED BY CMS-2020-01-R: HCPCS | Mod: 90 | Performed by: PATHOLOGY

## 2021-10-11 PROCEDURE — U0005 INFEC AGEN DETEC AMPLI PROBE: HCPCS | Mod: 90 | Performed by: PATHOLOGY

## 2021-10-11 NOTE — OR NURSING
"Pre-op call was done with pt's dad, who said pt needs a sedative on arrival to pre-op. The following email was sent to Deidre person:     Ameena Jett \"Lyndsay\" Rashad ( MR [id number]) is a 20-year-old arriving on 10/14/21 at [facility] for eye surgery.  She is intellectually and developmentally delayed and has low vision, low hearing, and severe anxiety. Per her dad, she needs a sedative on arrival to Pre-op. She will drink liquid sedation in coke or sprite. She also had severe vomiting after her last surgery per dad.  Her parents are co legal guardians and will both be there on DOS to sign consent.     Should I tell them to bring her 1 and 1/2 hrs early instead of 2 hrs?    Thanks so much.    Karla Jose, RN, BSN  Preanesthesia Screening  [phone] Direct number  "

## 2021-10-12 ENCOUNTER — ANESTHESIA EVENT (OUTPATIENT)
Dept: SURGERY | Facility: CLINIC | Age: 21
End: 2021-10-12
Payer: COMMERCIAL

## 2021-10-12 NOTE — ANESTHESIA PREPROCEDURE EVALUATION
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]  Low vision, both eyes [H54.3]   Procedure : Procedure(s):  KERATOPLASTY, PENETRATING and possibly iridotomy, right eye  EXAM UNDER ANESTHESIA, EYE     Past Medical History:   Diagnosis Date     6p partial monosomy syndrome      Dandy-Walker syndrome (H)      Dandy-Walker syndrome (H)      Glaucoma      Immune deficiency disorder (H)      Nystagmus      Peter's anomaly      PONV (postoperative nausea and vomiting)      Ptosis      Strabismus       Past Surgical History:   Procedure Laterality Date     C ANESTH,CORNEAL TRANSPLANT       EXAM UNDER ANESTHESIA EYE(S) Bilateral 9/3/2020    Procedure: EXAM UNDER ANESTHESIA, BILATERAL EYE, WITH RETCAM PHOTOS AND A/B ULTRASOUND;  Surgeon: Romina Fam MD;  Location: [facility] OR     EXAM UNDER ANESTHESIA EYE(S) Right 2021    Procedure: Exam under anesthesia eye(s);  Surgeon: Rebel Lopez MD;  Location: [facility] OR     g tube removed  2002     GASTROSTOMY TUBE  2001     HERNIA REPAIR  3/2009     HERNIA REPAIR  2009     KERATOPLASTY PENETRATING      BE     KERATOPLASTY PENETRATING Right 2021    Procedure: KERATOPLASTY, PENETRATING right eye;  Surgeon: Rebel Lopez MD;  Location: [facility] OR     PE TUBES      x 5     REPAIR VENTRICULAR SEPTAL DEFECT  2001     REVISE SHUNT VENTRICULARPERITONEAL CHILD  2001     SCRUB ETHYLENEDIAMENETETRAACETIC (EDTA) Right 9/3/2020    Procedure: 1. ETHYLENEDIAMINETETRAACETIC ACID Chelation,  2. Superficial keratectomy,  3. Placement of bandage contact lens (Kontour 20 mm),;  Surgeon: Carter Salinas MD;  Location: [facility] OR     TYMPANOPLASTY, RT/LT Left 2007     TYMPANOPLASTY, RT/LT Right 2008      Allergies   Allergen Reactions     Latex      Seasonal Allergies       Social History      Tobacco Use     Smoking status: Never Smoker     Smokeless tobacco: Never Used   Substance Use Topics     Alcohol use: Not on file      Wt Readings from Last 1 Encounters:   08/18/21 49.4 kg (109 lb)        Anesthesia Evaluation   Pt has had prior anesthetic.     History of anesthetic complications  - PONV.      ROS/MED HX  ENT/Pulmonary:     (+) sleep apnea,     Neurologic:     (+) Developmental delay,     Cardiovascular: Comment: S/p VSD repair at 4 weeks of age.       METS/Exercise Tolerance: 3 - Able to walk 1-2 blocks without stopping    Hematologic:  - neg hematologic  ROS     Musculoskeletal:  - neg musculoskeletal ROS     GI/Hepatic:  - neg GI/hepatic ROS     Renal/Genitourinary:  - neg Renal ROS     Endo:  - neg endo ROS     Psychiatric/Substance Use:     (+) psychiatric history anxiety     Infectious Disease:  - neg infectious disease ROS     Malignancy:  - neg malignancy ROS     Other:      (+) , other significant disability Blind (Dandy Walker syndrome),          Physical Exam    Airway      Comment: Feasible, unable to examine, non cooperant.    Mallampati: III   TM distance: > 3 FB   Neck ROM: full   Mouth opening: > 3 cm    Respiratory Devices and Support         Dental  no notable dental history         Cardiovascular   cardiovascular exam normal          Pulmonary   pulmonary exam normal            Other findings: 09/03/20; 1454; Easy; Intravenous; Easy; 3.5 (airq); laryngeal mask airway; Equal, clear and bilateral; Resident    OUTSIDE LABS:  CBC:   Lab Results   Component Value Date    WBC 5.9 01/11/2006    HGB 10.9 01/11/2006    HCT 32.9 01/11/2006     01/11/2006     BMP: No results found for: NA, POTASSIUM, CHLORIDE, CO2, BUN, CR, GLC  COAGS: No results found for: PTT, INR, FIBR  POC:   Lab Results   Component Value Date    HCG Negative 08/18/2021     HEPATIC: No results found for: ALBUMIN, PROTTOTAL, ALT, AST, GGT, ALKPHOS, BILITOTAL, BILIDIRECT, GILBERTO  OTHER: No results found for:  PH, LACT, A1C, LEONARD, PHOS, MAG, LIPASE, AMYLASE, TSH, T4, T3, CRP, SED    Anesthesia Plan    ASA Status:  3   NPO Status:  NPO Appropriate    Anesthesia Type: General.     - Airway: LMA   Induction: Intravenous, Propofol.   Maintenance: Balanced.        Consents    Anesthesia Plan(s) and associated risks, benefits, and realistic alternatives discussed. Questions answered and patient/representative(s) expressed understanding.     - Discussed with:  Patient, Parent (Mother and/or Father), Legal Guardian      - Extended Intubation/Ventilatory Support Discussed: No.      - Patient is DNR/DNI Status: No    Use of blood products discussed: No .     Postoperative Care    Pain management: IV analgesics, Oral pain medications, Multi-modal analgesia.   PONV prophylaxis: Dexamethasone or Solumedrol, Ondansetron (or other 5HT-3)     Comments:    Pre op versed oral requested. The patient spit the Vresed. We will do Ketamine im and piv placement in OR.     The patient did NOT  need the Ketamine im. Although she spit most of the Versed, enough was absorbed that she accepted mask induction. She needs a very small dose of Versed (<1/3 or 15 mg) to be sedated for mask induction).                Leilani Nj MD

## 2021-10-13 ENCOUNTER — ANESTHESIA (OUTPATIENT)
Dept: SURGERY | Facility: CLINIC | Age: 21
End: 2021-10-13
Payer: COMMERCIAL

## 2021-10-13 ENCOUNTER — HOSPITAL ENCOUNTER (OUTPATIENT)
Facility: CLINIC | Age: 21
Discharge: HOME | End: 2021-10-13
Attending: OPHTHALMOLOGY | Admitting: OPHTHALMOLOGY
Payer: COMMERCIAL

## 2021-10-13 VITALS
HEART RATE: 83 BPM | WEIGHT: 110.45 LBS | OXYGEN SATURATION: 100 % | HEIGHT: 59 IN | SYSTOLIC BLOOD PRESSURE: 101 MMHG | BODY MASS INDEX: 22.27 KG/M2 | RESPIRATION RATE: 12 BRPM | DIASTOLIC BLOOD PRESSURE: 70 MMHG | TEMPERATURE: 97.4 F

## 2021-10-13 DIAGNOSIS — Q13.4 PETER'S ANOMALY: ICD-10-CM

## 2021-10-13 DIAGNOSIS — Z94.7 PENETRATING KERATOPLASTY GRAFT IN PLACE: ICD-10-CM

## 2021-10-13 DIAGNOSIS — Z94.7 CORNEA REPLACED BY TRANSPLANT: ICD-10-CM

## 2021-10-13 DIAGNOSIS — Z98.890 POSTSURGICAL STATES FOLLOWING SURGERY OF EYE AND ADNEXA: ICD-10-CM

## 2021-10-13 DIAGNOSIS — F79 INTELLECTUAL DISABILITY: Primary | ICD-10-CM

## 2021-10-13 PROCEDURE — 250N000009 HC RX 250

## 2021-10-13 PROCEDURE — 250N000013 HC RX MED GY IP 250 OP 250 PS 637: Performed by: ANESTHESIOLOGY

## 2021-10-13 PROCEDURE — 370N000017 HC ANESTHESIA TECHNICAL FEE, PER MIN: Performed by: OPHTHALMOLOGY

## 2021-10-13 PROCEDURE — 258N000003 HC RX IP 258 OP 636

## 2021-10-13 PROCEDURE — 99207 PR BUNDLED PROCEDURE IN GLOBAL PKG: CPT | Mod: GC | Performed by: OPHTHALMOLOGY

## 2021-10-13 PROCEDURE — 710N000012 HC RECOVERY PHASE 2, PER MINUTE: Performed by: OPHTHALMOLOGY

## 2021-10-13 PROCEDURE — 360N000074 HC SURGERY LEVEL 1, PER MIN: Performed by: OPHTHALMOLOGY

## 2021-10-13 PROCEDURE — 250N000011 HC RX IP 250 OP 636

## 2021-10-13 PROCEDURE — 250N000009 HC RX 250: Performed by: STUDENT IN AN ORGANIZED HEALTH CARE EDUCATION/TRAINING PROGRAM

## 2021-10-13 PROCEDURE — 250N000011 HC RX IP 250 OP 636: Performed by: ANESTHESIOLOGY

## 2021-10-13 PROCEDURE — 999N000141 HC STATISTIC PRE-PROCEDURE NURSING ASSESSMENT: Performed by: OPHTHALMOLOGY

## 2021-10-13 PROCEDURE — 710N000010 HC RECOVERY PHASE 1, LEVEL 2, PER MIN: Performed by: OPHTHALMOLOGY

## 2021-10-13 PROCEDURE — 250N000009 HC RX 250: Performed by: OPHTHALMOLOGY

## 2021-10-13 PROCEDURE — 250N000025 HC SEVOFLURANE, PER MIN: Performed by: OPHTHALMOLOGY

## 2021-10-13 RX ORDER — FENTANYL CITRATE 50 UG/ML
0.5 INJECTION, SOLUTION INTRAMUSCULAR; INTRAVENOUS EVERY 10 MIN PRN
Status: DISCONTINUED | OUTPATIENT
Start: 2021-10-13 | End: 2021-10-13 | Stop reason: HOSPADM

## 2021-10-13 RX ORDER — LIDOCAINE HYDROCHLORIDE 20 MG/ML
INJECTION, SOLUTION INFILTRATION; PERINEURAL PRN
Status: DISCONTINUED | OUTPATIENT
Start: 2021-10-13 | End: 2021-10-13

## 2021-10-13 RX ORDER — BALANCED SALT SOLUTION 6.4; .75; .48; .3; 3.9; 1.7 MG/ML; MG/ML; MG/ML; MG/ML; MG/ML; MG/ML
SOLUTION OPHTHALMIC PRN
Status: DISCONTINUED | OUTPATIENT
Start: 2021-10-13 | End: 2021-10-13 | Stop reason: HOSPADM

## 2021-10-13 RX ORDER — MORPHINE SULFATE 2 MG/ML
1 INJECTION, SOLUTION INTRAMUSCULAR; INTRAVENOUS
Status: DISCONTINUED | OUTPATIENT
Start: 2021-10-13 | End: 2021-10-13 | Stop reason: HOSPADM

## 2021-10-13 RX ORDER — GLYCOPYRROLATE 0.2 MG/ML
INJECTION, SOLUTION INTRAMUSCULAR; INTRAVENOUS PRN
Status: DISCONTINUED | OUTPATIENT
Start: 2021-10-13 | End: 2021-10-13

## 2021-10-13 RX ORDER — MIDAZOLAM HYDROCHLORIDE 2 MG/ML
15 SYRUP ORAL ONCE
Status: COMPLETED | OUTPATIENT
Start: 2021-10-13 | End: 2021-10-13

## 2021-10-13 RX ORDER — MOXIFLOXACIN 5 MG/ML
1 SOLUTION/ DROPS OPHTHALMIC
Status: COMPLETED | OUTPATIENT
Start: 2021-10-13 | End: 2021-10-13

## 2021-10-13 RX ORDER — OFLOXACIN 3 MG/ML
SOLUTION/ DROPS OPHTHALMIC PRN
Status: DISCONTINUED | OUTPATIENT
Start: 2021-10-13 | End: 2021-10-13 | Stop reason: HOSPADM

## 2021-10-13 RX ORDER — ONDANSETRON 2 MG/ML
2 INJECTION INTRAMUSCULAR; INTRAVENOUS ONCE
Status: COMPLETED | OUTPATIENT
Start: 2021-10-13 | End: 2021-10-13

## 2021-10-13 RX ORDER — DEXAMETHASONE SODIUM PHOSPHATE 4 MG/ML
INJECTION, SOLUTION INTRA-ARTICULAR; INTRALESIONAL; INTRAMUSCULAR; INTRAVENOUS; SOFT TISSUE PRN
Status: DISCONTINUED | OUTPATIENT
Start: 2021-10-13 | End: 2021-10-13

## 2021-10-13 RX ORDER — PROPARACAINE HYDROCHLORIDE 5 MG/ML
1 SOLUTION/ DROPS OPHTHALMIC ONCE
Status: COMPLETED | OUTPATIENT
Start: 2021-10-13 | End: 2021-10-13

## 2021-10-13 RX ORDER — ONDANSETRON 2 MG/ML
INJECTION INTRAMUSCULAR; INTRAVENOUS PRN
Status: DISCONTINUED | OUTPATIENT
Start: 2021-10-13 | End: 2021-10-13

## 2021-10-13 RX ORDER — ONDANSETRON 2 MG/ML
2 INJECTION INTRAMUSCULAR; INTRAVENOUS ONCE
Status: DISCONTINUED | OUTPATIENT
Start: 2021-10-13 | End: 2021-10-13 | Stop reason: HOSPADM

## 2021-10-13 RX ORDER — PROPOFOL 10 MG/ML
INJECTION, EMULSION INTRAVENOUS PRN
Status: DISCONTINUED | OUTPATIENT
Start: 2021-10-13 | End: 2021-10-13

## 2021-10-13 RX ORDER — SODIUM CHLORIDE, SODIUM LACTATE, POTASSIUM CHLORIDE, CALCIUM CHLORIDE 600; 310; 30; 20 MG/100ML; MG/100ML; MG/100ML; MG/100ML
INJECTION, SOLUTION INTRAVENOUS CONTINUOUS PRN
Status: DISCONTINUED | OUTPATIENT
Start: 2021-10-13 | End: 2021-10-13

## 2021-10-13 RX ADMIN — SODIUM CHLORIDE, POTASSIUM CHLORIDE, SODIUM LACTATE AND CALCIUM CHLORIDE: 600; 310; 30; 20 INJECTION, SOLUTION INTRAVENOUS at 07:53

## 2021-10-13 RX ADMIN — MOXIFLOXACIN HYDROCHLORIDE 1 DROP: 5 SOLUTION/ DROPS OPHTHALMIC at 07:19

## 2021-10-13 RX ADMIN — DEXAMETHASONE SODIUM PHOSPHATE 4 MG: 4 INJECTION, SOLUTION INTRAMUSCULAR; INTRAVENOUS at 08:00

## 2021-10-13 RX ADMIN — ONDANSETRON 4 MG: 2 INJECTION INTRAMUSCULAR; INTRAVENOUS at 08:00

## 2021-10-13 RX ADMIN — ACETAMINOPHEN 325 MG: 325 SOLUTION ORAL at 07:08

## 2021-10-13 RX ADMIN — PHENYLEPHRINE HYDROCHLORIDE 100 MCG: 10 INJECTION INTRAVENOUS at 07:55

## 2021-10-13 RX ADMIN — LIDOCAINE HYDROCHLORIDE 100 MG: 20 INJECTION, SOLUTION INFILTRATION; PERINEURAL at 07:52

## 2021-10-13 RX ADMIN — ONDANSETRON 2 MG: 2 INJECTION INTRAMUSCULAR; INTRAVENOUS at 09:09

## 2021-10-13 RX ADMIN — MOXIFLOXACIN HYDROCHLORIDE 1 DROP: 5 SOLUTION/ DROPS OPHTHALMIC at 07:25

## 2021-10-13 RX ADMIN — PROPARACAINE HYDROCHLORIDE 1 DROP: 5 SOLUTION/ DROPS OPHTHALMIC at 07:12

## 2021-10-13 RX ADMIN — MIDAZOLAM HYDROCHLORIDE 15 MG: 2 SYRUP ORAL at 07:07

## 2021-10-13 RX ADMIN — PROPOFOL 40 MG: 10 INJECTION, EMULSION INTRAVENOUS at 07:53

## 2021-10-13 RX ADMIN — PROPOFOL 100 MG: 10 INJECTION, EMULSION INTRAVENOUS at 07:52

## 2021-10-13 RX ADMIN — MOXIFLOXACIN HYDROCHLORIDE 1 DROP: 5 SOLUTION/ DROPS OPHTHALMIC at 07:13

## 2021-10-13 RX ADMIN — GLYCOPYRROLATE 0.2 MG: 0.2 INJECTION, SOLUTION INTRAMUSCULAR; INTRAVENOUS at 07:55

## 2021-10-13 RX ADMIN — PHENYLEPHRINE HYDROCHLORIDE 100 MCG: 10 INJECTION INTRAVENOUS at 07:59

## 2021-10-13 ASSESSMENT — MIFFLIN-ST. JEOR: SCORE: 1176.63

## 2021-10-13 NOTE — DISCHARGE INSTRUCTIONS
** Continue current drops as prior **    Please call [phone] and wait for the prompts to reach the on-call doctor if you develop severe pain, decreased vision, redness, or severe sensitivity to light.    You have an appt with Dr. Lopez on November 2nd.     Right eye only:  Ofloxacin 4x a day  Prednisolone 4x a day  Erythromycin ointment 6 times daily     Left eye only:  FML 2x a day    Both eyes:  Cosopt 2x a day both eyes  Alphagan 2x a day both eyes  Xalatan at bedtime once both eyes  Refresh PM at bedtime as needed each eye  Artificial tears as needed each eye      Same-Day Surgery   Adult Discharge Orders & Instructions     For 24 hours after surgery:  1. Get plenty of rest.  A responsible adult must stay with you for at least 24 hours after you leave the hospital.   2. Pain medication can slow your reflexes. Do not drive or use heavy equipment.  If you have weakness or tingling, don't drive or use heavy equipment until this feeling goes away.  3. Mixing alcohol and pain medication can cause dizziness and slow your breathing. It can even be fatal. Do not drink alcohol while taking pain medication.  4. Avoid strenuous or risky activities.  Ask for help when climbing stairs.   5. You may feel lightheaded.  If so, sit for a few minutes before standing.  Have someone help you get up.   6. If you have nausea (feel sick to your stomach), drink only clear liquids such as apple juice, ginger ale, broth or 7-Up.  Rest may also help.  Be sure to drink enough fluids.  Move to a regular diet as you feel able. Take pain medications with a small amount of solid food, such as toast or crackers, to avoid nausea.   7. A slight fever is normal. Call the doctor if your fever is over 100 F (37.7 C) (taken under the tongue) or lasts longer than 24 hours.  8. You may have a dry mouth, muscle aches, trouble sleeping or a sore throat.  These symptoms should go away after 24 hours.  9. Do not make important or legal decisions.    Pain Management:      1. Take pain medication (if prescribed) for pain as directed by your physician.        2. WARNING: If the pain medication you have been prescribed contains Tylenol (acetaminophen), DO NOT take additional doses of Tylenol (acetaminophen).     Call your doctor for any of the followin.  Signs of infection (fever, growing tenderness at the surgery site, severe pain, a large amount of drainage or bleeding, foul-smelling drainage, redness, swelling).    2.  It has been over 8 to 10 hours since surgery and you are still not able to urinate (pee).    3.  Headache for over 24 hours.    4.  Numbness, tingling or weakness the day after surgery (if you had spinal anesthesia).  To contact a doctor, call Dr. Lopez, Eye Clinic at [phone] or:      [phone] and ask for the Resident On Call for:          Ophthalmology (answered 24 hours a day)      Emergency Department:  [place] [facility]: [phone]  [place] [facility]: [phone]               Rev. 10/2014      23-Dec-2019 17:19

## 2021-10-13 NOTE — ANESTHESIA CARE TRANSFER NOTE
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    Anesthesia Type:   General     Note:    Oropharynx: oropharynx clear of all foreign objects and spontaneously breathing  Level of Consciousness: drowsy  Oxygen Supplementation: face mask  Level of Supplemental Oxygen (L/min / FiO2): 4  Independent Airway: airway patency satisfactory and stable  Dentition: dentition unchanged  Vital Signs Stable: post-procedure vital signs reviewed and stable  Report to RN Given: handoff report given  Patient transferred to: PACU    Handoff Report: Identifed the Patient, Identified the Reponsible Provider, Reviewed the pertinent medical history, Discussed the surgical course, Reviewed Intra-OP anesthesia mangement and issues during anesthesia, Set expectations for post-procedure period and Allowed opportunity for questions and acknowledgement of understanding      Vitals:  Vitals Value Taken Time   /67 10/13/21 0830   Temp     Pulse 98 10/13/21 0833   Resp 66 10/13/21 0833   SpO2 100 % 10/13/21 0833   Vitals shown include unvalidated device data.    Electronically Signed By: MARLEE Calzada CRNA  October 13, 2021  8:33 AM

## 2021-10-13 NOTE — ANESTHESIA PROCEDURE NOTES
Airway       Patient location during procedure: OR  Staff -        CRNA: Jam Quintero APRN CRNA       Performed By: CRNAIndications and Patient Condition       Indications for airway management: devon-procedural       Induction type:inhalational       Mask difficulty assessment: 1 - vent by mask    Final Airway Details       Final airway type: supraglottic airway    Supraglottic Airway Details        Type: LMA       Brand: Air-Q       LMA size: 3.5    Post intubation assessment        Placement verified by: capnometry, equal breath sounds and chest rise        Number of attempts at approach: 1       Number of other approaches attempted: 0       Secured with: plastic tape       Ease of procedure: easy       Dentition: Intact and Unchanged

## 2021-10-13 NOTE — ANESTHESIA POSTPROCEDURE EVALUATION
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    Anesthesia Type:  General    Note:  Disposition: Outpatient   Postop Pain Control: Uneventful            Sign Out: Well controlled pain   PONV: No   Neuro/Psych: Uneventful            Sign Out: Acceptable/Baseline neuro status   Airway/Respiratory: Uneventful            Sign Out: Acceptable/Baseline resp. status   CV/Hemodynamics: Uneventful            Sign Out: Acceptable CV status; No obvious hypovolemia; No obvious fluid overload   Other NRE: NONE   DID A NON-ROUTINE EVENT OCCUR? No           Last vitals:  Vitals Value Taken Time   /76 10/13/21 0845   Temp 36.3  C (97.4  F) 10/13/21 0900   Pulse 106 10/13/21 0845   Resp 18 10/13/21 0845   SpO2 99 % 10/13/21 0900   Vitals shown include unvalidated device data.    Electronically Signed By: Leilani Nj MD  October 13, 2021  10:31 AM

## 2021-10-13 NOTE — OP NOTE
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 Rebel Lopez M.D.  ASSISTANT: Dr. Mariluz Davis M.D.    FINDINGS: Loose suture at 9:00  COMPLICATIONS: None  BLOOD LOSS: None  SPECIMENS: None     INDICATION FOR PROCEDURE    The patient has been followed by the ophthalmology service. In clinic exam is limited by patient underlying intellectual disability and nystagmus. Patient was found to have a loose suture on the right cornea when she was in clinic. We recommended suture removal of the right cornea for loose suture with an exam under anesthesia for both eyes.   The risks, including, but not limited to infection, loss of vision, loss of eye, need for more surgery, and bleeding, along with the benefits, alternatives, expectations, and the procedure itself were discussed at length with the patient and her parents who wished to proceed with surgery.      DESCRIPTION OF PROCEDURE    In the preoperative suite, the patient was identified and informed consent was obtained. The patient was then brought back to the operative suite where the appropriate anesthesia monitors were connected and general anesthesia induced. A routine time-out was performed and examination under anesthesia was performed for both eyes with the following findings:    Intraocular pressure, Tp: OD: 16 mmHg, OS: 12 mmHg  SLE:    Ext: exophthalmos R>L  L/L: wnl OU  C/S: w/q OU  K: right eye: PKP in place with sutures, however there is one loose suture at 9:00 of the graft with underlying mucus. PEE and mild graft edema. No infiltrates noted.   left eye: clear PKP, no sutures  AC: shallow OU  I: corectopia OU  Lens: clear OU  AV: red reflex OU    Loose suture at 9:00 of the graft with underlying mucus was cut with a Super Sharp blade and  removed with forceps pull, without any complication.     Ofloxacin was then placed in both eyes at the end for antibiotic prophylaxis. The patient was then awoken from anesthesia and extubated by the anesthesia team.  The patient was then transferred to the post-operative suite in good condition. Patient tolerated the surgery well.     Dr. Rebel Lopez was scrubbed and present for the entire case.     I was present for the entire procedure(s). - Rebel Lopez MD

## 2021-10-13 NOTE — BRIEF OP NOTE
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

## 2021-11-02 ENCOUNTER — MYC MEDICAL ADVICE (OUTPATIENT)
Dept: OPHTHALMOLOGY | Facility: CLINIC | Age: 21
End: 2021-11-02

## 2021-11-02 ENCOUNTER — OFFICE VISIT (OUTPATIENT)
Dept: OPHTHALMOLOGY | Facility: CLINIC | Age: 21
End: 2021-11-02
Attending: OPHTHALMOLOGY
Payer: COMMERCIAL

## 2021-11-02 ENCOUNTER — TELEPHONE (OUTPATIENT)
Dept: OPHTHALMOLOGY | Facility: CLINIC | Age: 21
End: 2021-11-02

## 2021-11-02 DIAGNOSIS — Z98.890 POSTSURGICAL STATES FOLLOWING SURGERY OF EYE AND ADNEXA: Primary | ICD-10-CM

## 2021-11-02 DIAGNOSIS — Z94.7 CORNEA REPLACED BY TRANSPLANT: ICD-10-CM

## 2021-11-02 DIAGNOSIS — H21.563 PUPIL IRREGULAR OF BOTH EYES: ICD-10-CM

## 2021-11-02 DIAGNOSIS — H55.01 NYSTAGMUS, CONGENITAL: ICD-10-CM

## 2021-11-02 DIAGNOSIS — H04.129 DRY EYE: ICD-10-CM

## 2021-11-02 PROCEDURE — 99024 POSTOP FOLLOW-UP VISIT: CPT | Mod: GC | Performed by: OPHTHALMOLOGY

## 2021-11-02 PROCEDURE — G0463 HOSPITAL OUTPT CLINIC VISIT: HCPCS

## 2021-11-02 ASSESSMENT — CONF VISUAL FIELD
OD_SUPERIOR_TEMPORAL_RESTRICTION: 3
OD_SUPERIOR_NASAL_RESTRICTION: 3
OD_INFERIOR_TEMPORAL_RESTRICTION: 3
OD_INFERIOR_NASAL_RESTRICTION: 3

## 2021-11-02 ASSESSMENT — VISUAL ACUITY
OD_CC: 20/400
OS_CC+: +2
METHOD: SNELLEN - LINEAR
OS_CC: 20/80
CORRECTION_TYPE: GLASSES

## 2021-11-02 ASSESSMENT — TONOMETRY
OS_IOP_MMHG: 10
IOP_METHOD: ICARE
OD_IOP_MMHG: 11

## 2021-11-02 ASSESSMENT — SLIT LAMP EXAM - LIDS
COMMENTS: EXOPHTHALMOS R>L
COMMENTS: EXOPHTHALMOS R>L

## 2021-11-02 ASSESSMENT — REFRACTION_WEARINGRX
OD_CYLINDER: SPHERE
OS_CYLINDER: +3.00
OS_AXIS: 120
OD_SPHERE: -3.50
OS_SPHERE: -6.50

## 2021-11-02 ASSESSMENT — EXTERNAL EXAM - LEFT EYE: OS_EXAM: PROTOSIS R>L, FAIR LID CLOSURE

## 2021-11-02 ASSESSMENT — PACHYMETRY: OD_CT(UM): 657

## 2021-11-02 NOTE — PROGRESS NOTES
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     Interval Hx:   s/p EDTA chelation/ SK  right eye 9/3/2020. LCV  6/7/21 with Dr. Salinas. S/p PK right eye 8/18/21.  She is here for 3 month post op visit. Patient is here with father. No eye pain. No change in vision. They are using the eye drops below daily without problems.        Current Ocular Meds:  Cosopt BID, OU  Alphagan BID OU  Xalatan QHS OU  FML BID left eye only  Refresh PM QHS PRN each eye  Artificial tears PRN each eye      Right eye specific gtts;  Oflox QID  Pred forte QID  Erythromycin ointment 6 times daily      Assessment and Plan:    # s/p EDTA chelation and superficial  Keratectomy right eye 9/3/20 (Dr. Salinas)     # Pressley anomaly both eyes 2000               - s/p repeat PK RE (8/18/21) (Jessica)               - S/P bilateral penetrating keratoplasty (PK) in 2000 age 12 days and 14 days                - band keratopathy inferiorly right eye now s/p EDTA chelation (See above), but still with remaining scarring.               - Vision today 20/400, IOP 11               - follows with Dr. Fam, Dr. Garcia, Dr. Webster.                - working with Dr. Garcia on scleral lens fittings. At visit 3/18/21, Dr. Garcia noted decrease in vision from 20/150 to CF, suspected early K edema -- improved to 20/200- w/ +6.00 OR that day. Per Dr. ADAN, SCL fit excellent.               - Graft with edema and KNV, not clear.     8/18/21 PKP right eye.     8/26/21: Post right eye PKP 8/18/21. Epi defect healing 40%. Will continue tx. Disc possible need for bandage CL.     9/3/21. 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 Will continue meds (stop right eye FML). Pt to continue shield at night with glasses during day.    10/13/21: EAU for loose suture removal.  Doing well.      11/2/21:      PLAN:  right eye meds.   - Pred Forte QID RE  - Ofloxacin QID RE  - Emycin 6 / day  -  Cont. glaucoma meds    - wear shield at night.     # right eye regraft (PKP) 8/18/21: pt doing well. No Epi defect over graft. 0.25mm superior temporal epi defect on host rim. Will continue jess for dryness on surface - but increase to q 2 hours (and include while at school). Disc need for tx of dry ocular surface.     # [S/p patch graft 2007 for spontaneous bleb formation right eye                        - with thin bleb, but chay neg, stable, monitor                         - minimize eye rubbing]     # Glaucoma, each eye                         - on xalatan, alphagan, and cosopt (timolol and dorzolamide separately now) OU                        - intraocular pressure good and stable                            - established care with Eleanor, plan for EUA with Dr WESTON in near future.               -IOP increased but pt off glaucoma meds day of surgery and this am. - Will restatr glaucoma meds today..      # Lagophthalmos, each eye                         - clear corneas, pt comfortable                         - continue refresh at bedtime both eyes        RTC:  2 months - call with any problems.    Rigo Pascual,   Fellow, Cornea & External Disease  Department of Ophthalmology  [facility]      Attending Physician Attestation:  Complete documentation of historical and exam elements from today's encounter can be found in the full encounter summary report (not reduplicated in this progress note).  I personally obtained the chief complaint(s) and history of present illness.  I confirmed and edited as necessary the review of systems, past medical/surgical history, family history, social history, and examination findings as documented by others; and I examined the patient myself.  I personally reviewed the relevant tests, images, and reports as documented above.  I formulated and edited as necessary the assessment and plan and discussed the findings and management plan with the patient and family. -  Rebel Lopez MD

## 2021-11-02 NOTE — TELEPHONE ENCOUNTER
Letter created with orders for school nursing./para to administer erythromycin ointment during school hours every 2 hours.    Letter printed, Dr. Pascual to sign and will be faxed today    Father aware  Father confirmed pt would not be able to administer on own.    Luke Hightower RN 2:31 PM 11/02/21

## 2021-11-04 ENCOUNTER — OFFICE VISIT (OUTPATIENT)
Dept: OPHTHALMOLOGY | Facility: CLINIC | Age: 21
End: 2021-11-04
Attending: OPHTHALMOLOGY
Payer: COMMERCIAL

## 2021-11-04 DIAGNOSIS — Z98.890 POSTSURGICAL STATES FOLLOWING SURGERY OF EYE AND ADNEXA: Primary | ICD-10-CM

## 2021-11-04 DIAGNOSIS — Q13.4 PETER'S ANOMALY: ICD-10-CM

## 2021-11-04 DIAGNOSIS — H55.01 NYSTAGMUS, CONGENITAL: ICD-10-CM

## 2021-11-04 DIAGNOSIS — Z94.7 CORNEA REPLACED BY TRANSPLANT: ICD-10-CM

## 2021-11-04 DIAGNOSIS — Q15.0 CONGENITAL GLAUCOMA: ICD-10-CM

## 2021-11-04 PROCEDURE — G0463 HOSPITAL OUTPT CLINIC VISIT: HCPCS

## 2021-11-04 PROCEDURE — 99213 OFFICE O/P EST LOW 20 MIN: CPT | Mod: 24 | Performed by: OPHTHALMOLOGY

## 2021-11-04 ASSESSMENT — CONF VISUAL FIELD
OS_INFERIOR_NASAL_RESTRICTION: 3
OD_INFERIOR_TEMPORAL_RESTRICTION: 3
OS_INFERIOR_TEMPORAL_RESTRICTION: 3
OD_SUPERIOR_TEMPORAL_RESTRICTION: 3
METHOD: COUNTING FINGERS

## 2021-11-04 ASSESSMENT — VISUAL ACUITY
METHOD: SNELLEN - LINEAR
OS_CC: 20/60
OD_CC: 20/400

## 2021-11-04 ASSESSMENT — REFRACTION_WEARINGRX
OS_CYLINDER: +3.00
OD_CYLINDER: SPHERE
OS_AXIS: 120
OD_SPHERE: -3.50
OS_SPHERE: -6.50

## 2021-11-04 ASSESSMENT — TONOMETRY
OS_IOP_MMHG: 9
OD_IOP_MMHG: 12
IOP_METHOD: ICARE

## 2021-11-04 NOTE — PATIENT INSTRUCTIONS
Xalatan at bedtime each eye     Alphagan twice a day each eye     Cosopt twice a day each eye     Prednisolone 1 drop 4 times per day right eye    Erythromycin ointment apply 1/4 inch amount every 2 hours in the  right eye    Fluorometholone twice a day left eye

## 2021-11-04 NOTE — NURSING NOTE
Chief Complaints and History of Present Illnesses   Patient presents with     Glaucoma Follow-Up     Chief Complaint(s) and History of Present Illness(es)     Glaucoma Follow-Up     Associated symptoms: Negative for eye pain, dryness, itching, floaters and flashes    Pain scale: 0/10              Comments     Lyndsay is here to follow up on glaucoma. 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 She says no changes since last visit here (two days ago).     Thiago Johnson COT 3:36 PM November 4, 2021

## 2021-11-04 NOTE — PROGRESS NOTES
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 Reyna for glaucoma management     Chief Complaint/Presenting Concern: Here for glaucoma follow up    History of Present Illness:   Ameena Solorzano is a 20 year old patient who presents for glaucoma follow up. She is s/p repeat PK in the right eye 8/18/21.. Today she is not in pain and states that things are going well. Using glaucoma gtts very appropriately - on alphagan, cosopt, xalatan each eye.     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 Pressley anomaly both eyes  - S/P bilateral penetrating keratoplasty (PK) as an infant (2000), with revision  - S/p EDTA chelation right eye 9/3/2020.  Over the interval, working with Dr. Garcia on scleral lens fittings.  - s/p repeat PK, right eye 8/18/21 - with edema and KNV   - Using PF QID right eye   - Follows w/ Dr. Fam and Dr. Salinas  - Dr. Salinas may consider repeat PKP vs KPro vs nasrin flap vs waiting     3. Lagophthalmos at night  - continue refresh at bedtime both  eyes    4. Congenital nystagmus    5. Dandy Walker Syndrome    6. Exotropia of right eye  - Stable  - Following with Dr. Fam     Plan/Recommendations:    Discussed findings with patient.    Difficult to tell if there is any progression in glaucoma given limited information on ONH status. Recommended EUA repeat to compare ONH photos to latest images performed by Dr. Fam during EUA on 09/03/2020. Will try to retrieve ONH imaging from the time of EUA at the time of Dr. Reyna.     Will plan for the EUA after the right eye cornea heals     Continue Xalatan at bedtime each eye     Continue Alphagan twice a day each eye     Continue Cosopt twice a day each eye     Continue FML BID, left eye     Continue Pred Forte QID right eye     Continue Ofloxacin QID right eye    Continue Emycin 6x/ day right eye     RTC in 5 months [facility], IOP       Physician Attestation     Attending Physician Attestation:  Complete documentation of historical and exam elements from today's encounter can be found in the full encounter summary report (not reduplicated in this progress note). I personally obtained the chief complaint(s) and history of present illness. I confirmed and edited as necessary the review of systems, past medical/surgical history, family history, social history, and examination findings as documented by others; and I examined the patient myself. I personally reviewed the relevant tests, images, and reports as documented above. I formulated and edited as necessary the assessment and plan and discussed the findings and management plan with the patient and any family members present at the time of the visit.  Jono Webster M.D., Glaucoma, November 5, 2021

## 2021-11-05 ASSESSMENT — EXTERNAL EXAM - LEFT EYE: OS_EXAM: PROTOSIS R>L, FAIR LID CLOSURE

## 2021-11-05 ASSESSMENT — SLIT LAMP EXAM - LIDS
COMMENTS: EXOPHTHALMOS R>L
COMMENTS: EXOPHTHALMOS R>L

## 2022-01-06 ENCOUNTER — OFFICE VISIT (OUTPATIENT)
Dept: OPHTHALMOLOGY | Facility: CLINIC | Age: 22
End: 2022-01-06
Attending: OPHTHALMOLOGY
Payer: COMMERCIAL

## 2022-01-06 DIAGNOSIS — H55.01 NYSTAGMUS, CONGENITAL: ICD-10-CM

## 2022-01-06 DIAGNOSIS — Z94.7 CORNEA REPLACED BY TRANSPLANT: Primary | ICD-10-CM

## 2022-01-06 DIAGNOSIS — Q15.0 CONGENITAL GLAUCOMA: ICD-10-CM

## 2022-01-06 DIAGNOSIS — H04.129 DRY EYE: ICD-10-CM

## 2022-01-06 PROCEDURE — G0463 HOSPITAL OUTPT CLINIC VISIT: HCPCS

## 2022-01-06 PROCEDURE — 99214 OFFICE O/P EST MOD 30 MIN: CPT | Mod: GC | Performed by: OPHTHALMOLOGY

## 2022-01-06 ASSESSMENT — SLIT LAMP EXAM - LIDS
COMMENTS: EXOPHTHALMOS R>L
COMMENTS: EXOPHTHALMOS R>L

## 2022-01-06 ASSESSMENT — REFRACTION_WEARINGRX
OS_SPHERE: -6.50
OD_CYLINDER: SPHERE
OS_AXIS: 120
OD_SPHERE: -3.50
OS_CYLINDER: +3.00

## 2022-01-06 ASSESSMENT — VISUAL ACUITY
OS_CC: 20/70
OD_CC: 20/400
METHOD: SNELLEN - LINEAR
CORRECTION_TYPE: GLASSES
OD_PH_CC: 20/300

## 2022-01-06 ASSESSMENT — CONF VISUAL FIELD
OD_SUPERIOR_NASAL_RESTRICTION: 3
OD_SUPERIOR_TEMPORAL_RESTRICTION: 3
OS_INFERIOR_NASAL_RESTRICTION: 3
OD_INFERIOR_NASAL_RESTRICTION: 3
OD_INFERIOR_TEMPORAL_RESTRICTION: 1

## 2022-01-06 ASSESSMENT — EXTERNAL EXAM - LEFT EYE: OS_EXAM: PROTOSIS R>L, FAIR LID CLOSURE

## 2022-01-06 ASSESSMENT — TONOMETRY
IOP_METHOD: ICARE
OD_IOP_MMHG: 13
OS_IOP_MMHG: 08

## 2022-01-06 NOTE — NURSING NOTE
Chief Complaints and History of Present Illnesses   Patient presents with     Follow Up     2 month follow up  right eye regraft (PKP) 8/18/21     Chief Complaint(s) and History of Present Illness(es)     Follow Up     Comments: 2 month follow up  right eye regraft (PKP) 8/18/21              Comments     Pt states vision has been good since last visit. No eye pain today.  No flashes or floaters. Some redness in RE upon waking, relief with drops.  No discharge.    HALIMA Edwarsd January 6, 2022 7:39 AM

## 2022-01-06 NOTE — PROGRESS NOTES
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     Interval Hx:   s/p EDTA chelation/ SK  right eye 9/3/2020.  S/p PK right eye 8/18/21.  Post PKP(8/18/21)  Patient is here with father. No eye pain. No change in vision. They are using the eye drops/ointment below daily without problems.        Current Ocular Meds:  Cosopt BID, OU  Alphagan BID OU  Xalatan QHS OU  FML BID left eye only  Refresh PM QHS PRN each eye  Artificial tears PRN each eye      Right eye specific gtts:  Pred forte QID  Erythromycin ointment Q2 hours daily      Assessment and Plan:    # s/p EDTA chelation and superficial  Keratectomy right eye 9/3/20 (Dr. Salinas)     # Pressley anomaly both eyes 2000               - s/p repeat PK RE (8/18/21) (Jessica)               - S/P bilateral penetrating keratoplasty (PK) in 2000 age 12 days and 14 days                - band keratopathy inferiorly right eye now s/p EDTA chelation (See above), but still with remaining scarring.               - Vision today 20/300, IOP 13               - follows with Dr. Fam, Dr. Garcia, Dr. Webster.                - working with Dr. Garcia on scleral lens fittings. At visit 3/18/21, Dr. Garcia noted decrease in vision from 20/150 to CF, suspected early K edema -- improved to 20/200- w/ +6.00 OR that day. Per Dr. ADAN, SCL fit excellent.               - Graft with edema and KNV, not clear.     8/18/21 PKP right eye.     8/26/21: Post right eye PKP 8/18/21. Epi defect healing 40%. Will continue tx. Disc possible need for bandage CL.     9/3/21. 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 Will continue meds (stop right eye FML). Pt to continue shield at night with glasses during day.    10/13/21: EAU for loose suture removal.  Doing well.      1/6/22: Dryness right eye despite Q2 hour erythro.  No epi defects.     PLAN:  right eye meds.   - Pred Forte QID RE  - Emycin Q2   - Cont. glaucoma  meds OU    - wear shield at night.     # right eye regraft (PKP) 1/6/22: pt doing well overall.  Vision a bit improved but surface quite dry. No Epi defect over graft. Will continue jess for dryness on surface q 2 hours (and include while at school). Disc need for tx of dry ocular surface.     # [S/p patch graft 2007 for spontaneous bleb formation right eye                        - with thin bleb, but chay neg, stable, monitor                         - minimize eye rubbing]     # Glaucoma, each eye                         - on xalatan, alphagan, and cosopt (timolol and dorzolamide separately now) OU                        - intraocular pressure good and stable                            - established care with Eleanor, plan for EUA with Dr WESTON in near future.               - IOP good today    # Lagophthalmos, each eye                         - clear corneas, pt comfortable                         - continue refresh at bedtime both eyes     RTC:  2-3 months - call with any problems.    Rigo Pascual,   Fellow, Cornea & External Disease  Department of Ophthalmology  [facility]      Attending Physician Attestation:  Complete documentation of historical and exam elements from today's encounter can be found in the full encounter summary report (not reduplicated in this progress note).  I personally obtained the chief complaint(s) and history of present illness.  I confirmed and edited as necessary the review of systems, past medical/surgical history, family history, social history, and examination findings as documented by others; and I examined the patient myself.  I personally reviewed the relevant tests, images, and reports as documented above.  I formulated and edited as necessary the assessment and plan and discussed the findings and management plan with the patient and family. - Rebel Lopez MD

## 2022-01-08 ENCOUNTER — HEALTH MAINTENANCE LETTER (OUTPATIENT)
Age: 22
End: 2022-01-08

## 2022-02-13 DIAGNOSIS — Z94.7 CORNEA REPLACED BY TRANSPLANT: ICD-10-CM

## 2022-02-14 DIAGNOSIS — Q13.4 PETER'S ANOMALY: ICD-10-CM

## 2022-02-14 RX ORDER — BRIMONIDINE TARTRATE 1 MG/ML
1 SOLUTION/ DROPS OPHTHALMIC 2 TIMES DAILY
Qty: 15 ML | Refills: 5 | Status: CANCELLED | OUTPATIENT
Start: 2022-02-14

## 2022-02-15 DIAGNOSIS — Q13.4 PETER'S ANOMALY: Primary | ICD-10-CM

## 2022-02-15 RX ORDER — PREDNISOLONE ACETATE 10 MG/ML
1 SUSPENSION/ DROPS OPHTHALMIC 4 TIMES DAILY
Qty: 5 ML | Refills: 2 | OUTPATIENT
Start: 2022-02-15

## 2022-02-20 DIAGNOSIS — H18.30 CORNEAL EPITHELIAL DEFECT: ICD-10-CM

## 2022-02-21 RX ORDER — ERYTHROMYCIN 5 MG/G
OINTMENT OPHTHALMIC
Qty: 3.5 G | Refills: 10 | Status: SHIPPED | OUTPATIENT
Start: 2022-02-21 | End: 2022-04-12

## 2022-04-12 ENCOUNTER — OFFICE VISIT (OUTPATIENT)
Dept: OPHTHALMOLOGY | Facility: CLINIC | Age: 22
End: 2022-04-12
Attending: OPHTHALMOLOGY
Payer: COMMERCIAL

## 2022-04-12 ENCOUNTER — TELEPHONE (OUTPATIENT)
Dept: OPHTHALMOLOGY | Facility: CLINIC | Age: 22
End: 2022-04-12

## 2022-04-12 ENCOUNTER — PREP FOR PROCEDURE (OUTPATIENT)
Dept: OPHTHALMOLOGY | Facility: CLINIC | Age: 22
End: 2022-04-12
Payer: COMMERCIAL

## 2022-04-12 DIAGNOSIS — Z94.7 CORNEA REPLACED BY TRANSPLANT: Primary | ICD-10-CM

## 2022-04-12 DIAGNOSIS — H21.563 PUPIL IRREGULAR OF BOTH EYES: ICD-10-CM

## 2022-04-12 DIAGNOSIS — H02.206 LAGOPHTHALMOS OF EYELIDS OF BOTH EYES, UNSPECIFIED EYELID, UNSPECIFIED LAGOPHTHALMOS TYPE: ICD-10-CM

## 2022-04-12 DIAGNOSIS — Q15.0 CONGENITAL GLAUCOMA: ICD-10-CM

## 2022-04-12 DIAGNOSIS — Z94.7 CORNEA REPLACED BY TRANSPLANT: ICD-10-CM

## 2022-04-12 DIAGNOSIS — Q13.4 PETER'S ANOMALY: ICD-10-CM

## 2022-04-12 DIAGNOSIS — H02.203 LAGOPHTHALMOS OF EYELIDS OF BOTH EYES, UNSPECIFIED EYELID, UNSPECIFIED LAGOPHTHALMOS TYPE: ICD-10-CM

## 2022-04-12 DIAGNOSIS — H18.30 CORNEAL EPITHELIAL DEFECT: ICD-10-CM

## 2022-04-12 DIAGNOSIS — Q13.4 PETER'S ANOMALY: Primary | ICD-10-CM

## 2022-04-12 DIAGNOSIS — H04.129 DRY EYE: ICD-10-CM

## 2022-04-12 DIAGNOSIS — Q15.0 CONGENITAL GLAUCOMA OF BOTH EYES: Primary | ICD-10-CM

## 2022-04-12 DIAGNOSIS — H55.01 NYSTAGMUS, CONGENITAL: ICD-10-CM

## 2022-04-12 PROCEDURE — 99214 OFFICE O/P EST MOD 30 MIN: CPT | Mod: 25 | Performed by: OPHTHALMOLOGY

## 2022-04-12 PROCEDURE — G0463 HOSPITAL OUTPT CLINIC VISIT: HCPCS

## 2022-04-12 PROCEDURE — 999N000103 HC STATISTIC NO CHARGE FACILITY FEE

## 2022-04-12 PROCEDURE — 99213 OFFICE O/P EST LOW 20 MIN: CPT | Mod: GC | Performed by: OPHTHALMOLOGY

## 2022-04-12 RX ORDER — LATANOPROST 50 UG/ML
1 SOLUTION/ DROPS OPHTHALMIC AT BEDTIME
Qty: 7.5 ML | Refills: 3 | Status: SHIPPED | OUTPATIENT
Start: 2022-04-12 | End: 2022-04-15

## 2022-04-12 RX ORDER — FLUOROMETHOLONE 0.1 %
SUSPENSION, DROPS(FINAL DOSAGE FORM)(ML) OPHTHALMIC (EYE)
Qty: 15 ML | Refills: 11 | Status: SHIPPED | OUTPATIENT
Start: 2022-04-12 | End: 2022-04-15

## 2022-04-12 RX ORDER — DORZOLAMIDE HYDROCHLORIDE AND TIMOLOL MALEATE 20; 5 MG/ML; MG/ML
1 SOLUTION/ DROPS OPHTHALMIC 2 TIMES DAILY
Qty: 10 ML | Refills: 7 | Status: SHIPPED | OUTPATIENT
Start: 2022-04-12 | End: 2022-04-15

## 2022-04-12 RX ORDER — CYCLOPENTOLAT/TROPIC/PHENYLEPH 1%-1%-2.5%
1 DROPS (EA) OPHTHALMIC (EYE)
Status: CANCELLED | OUTPATIENT
Start: 2022-04-12

## 2022-04-12 RX ORDER — ERYTHROMYCIN 5 MG/G
OINTMENT OPHTHALMIC
Qty: 3.5 G | Refills: 10 | Status: SHIPPED | OUTPATIENT
Start: 2022-04-12 | End: 2022-04-15

## 2022-04-12 RX ORDER — BRIMONIDINE TARTRATE 1.5 MG/ML
1 SOLUTION/ DROPS OPHTHALMIC 2 TIMES DAILY
Qty: 10 ML | Refills: 3 | Status: SHIPPED | OUTPATIENT
Start: 2022-04-12 | End: 2022-04-15

## 2022-04-12 RX ORDER — PREDNISOLONE ACETATE 10 MG/ML
1 SUSPENSION/ DROPS OPHTHALMIC 4 TIMES DAILY
Qty: 10 ML | Refills: 2 | Status: SHIPPED | OUTPATIENT
Start: 2022-04-12 | End: 2022-04-15

## 2022-04-12 ASSESSMENT — CONF VISUAL FIELD
OD_SUPERIOR_TEMPORAL_RESTRICTION: 3
OS_SUPERIOR_NASAL_RESTRICTION: 3
OD_INFERIOR_NASAL_RESTRICTION: 3
OS_SUPERIOR_TEMPORAL_RESTRICTION: 3
OD_SUPERIOR_TEMPORAL_RESTRICTION: 3
OD_SUPERIOR_NASAL_RESTRICTION: 3
OD_INFERIOR_TEMPORAL_RESTRICTION: 3
OD_SUPERIOR_NASAL_RESTRICTION: 3
OS_INFERIOR_TEMPORAL_RESTRICTION: 3
OS_SUPERIOR_NASAL_RESTRICTION: 3
OS_INFERIOR_TEMPORAL_RESTRICTION: 3
OD_INFERIOR_TEMPORAL_RESTRICTION: 3
OD_INFERIOR_NASAL_RESTRICTION: 3
OS_SUPERIOR_TEMPORAL_RESTRICTION: 3
OS_INFERIOR_NASAL_RESTRICTION: 3
OS_INFERIOR_NASAL_RESTRICTION: 3

## 2022-04-12 ASSESSMENT — VISUAL ACUITY
OS_CC: 20/60
OS_CC+: -
OD_CC: 20/250
CORRECTION_TYPE: GLASSES
CORRECTION_TYPE: GLASSES
OS_CC: 20/60
METHOD: SNELLEN - LINEAR
METHOD: SNELLEN - LINEAR
OS_CC+: -
OD_CC: 20/250

## 2022-04-12 ASSESSMENT — REFRACTION_WEARINGRX
OS_CYLINDER: +3.00
OD_SPHERE: -3.50
OD_CYLINDER: SPHERE
OS_SPHERE: -6.50
OS_AXIS: 120

## 2022-04-12 ASSESSMENT — TONOMETRY
OS_IOP_MMHG: 13
IOP_METHOD: TONOPEN
OD_IOP_MMHG: 18
IOP_METHOD: TONOPEN
OD_IOP_MMHG: 18
OS_IOP_MMHG: 13

## 2022-04-12 ASSESSMENT — SLIT LAMP EXAM - LIDS
COMMENTS: EXOPHTHALMOS R>L

## 2022-04-12 ASSESSMENT — EXTERNAL EXAM - LEFT EYE
OS_EXAM: PROTOSIS R>L, FAIR LID CLOSURE
OS_EXAM: PROTOSIS R>L, FAIR LID CLOSURE

## 2022-04-12 NOTE — NURSING NOTE
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  She states that her vision has seemed stable in both eyes since her last eye exam.    Patient denies having any eye discomfort.    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  She states that her vision has seemed stable in both eyes since her last eye exam.    Patient denies having any eye discomfort.    She is using:   Xalatan at bedtime each eye    Alphagan twice a day each eye    Cosopt twice a day each eye    FML BID, left eye    Pred Forte QID right eye    Emycin 6x/ day right eye     Karyna Gonzalez, COT 7:36 AM  April 12, 2022                        Warm

## 2022-04-12 NOTE — PROGRESS NOTES
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     Interval Hx:   s/p EDTA chelation/ SK  right eye 9/3/2020.  S/p PK right eye 8/18/21.  Post PKP(8/18/21)  Patient is here with father. No new vision changes, eye pain, discharge, redness since last visit. Saw Dr. Webster today, will be undergoing EUA       Current Ocular Meds:  Cosopt BID, OU  Alphagan BID OU  Xalatan QHS OU  FML BID left eye only   Pred forte QID right eye   Erythromycin ointment Q2 hours daily right eye    Anthony ointment at nighttime left eye   Artificial tears PRN each eye      Assessment and Plan:  # s/p EDTA chelation and superficial  Keratectomy right eye 9/3/20 (Dr. Salinas)     # Pressley anomaly both eyes 2000               - s/p repeat PK RE (8/18/21) (Jessica)               - S/P bilateral penetrating keratoplasty (PK) in 2000 age 12 days and 14 days                - band keratopathy inferiorly right eye now s/p EDTA chelation (See above), but still with remaining scarring.               - Vision today 20/300, IOP 13               - follows with Dr. Fam, Dr. Garcia, Dr. Webster.                - working with Dr. Garcia on scleral lens fittings. At visit 3/18/21, Dr. Garcia noted decrease in vision from 20/150 to CF, suspected early K edema -- improved to 20/200- w/ +6.00 OR that day. Per Dr. ADAN, SCL fit excellent.               - Graft with edema and KNV, not clear.     8/18/21 PKP right eye.     8/26/21: Post right eye PKP 8/18/21. Epi defect healing 40%. Will continue tx. Disc possible need for bandage CL.     9/3/21. 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 Will continue meds (stop right eye FML). Pt to continue shield at night with glasses during day.    10/13/21: EAU for loose suture removal.  Doing well.      1/6/22: pt doing well overall.  Vision a bit improved but surface quite dry. No Epi defect over graft. Will continue jess for dryness  on surface q 2 hours (and include while at school). Disc need for tx of dry ocular surface.    4/12/22: Vision stable Surface dryness, no epi defects.      PLAN:  - Continue Pred Forte QID right eye   - Continue Emycin Q2H right eye   - Continue FML BID left eye   - Continue Anthony jess nightly left eye  - Continue glaucoma meds each eye  - Will plan for EUA with Dr. Webster to further assess cornea and possible suture removal     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                        - with thin bleb, but chay neg, stable, monitor                        - minimize eye rubbing     # Glaucoma, each eye                         - on xalatan, alphagan, and cosopt (timolol and dorzolamide separately now) OU                        - intraocular pressure good and stable                        - established care with Eleanor, plan for EUA in the near future                        - IOP good today    # Lagophthalmos, each eye                        - clear corneas, pt comfortable                         - continue refresh at bedtime both eyes     RTC: Will leave sutures in place to protect eye (no loose sutures) - RTC 4 months; sooner if changes    Criselda Winchester MD  Ophthalmology Resident, PGY-3      Attending Physician Attestation:  Complete documentation of historical and exam elements from today's encounter can be found in the full encounter summary report (not reduplicated in this progress note).  I personally obtained the chief complaint(s) and history of present illness.  I confirmed and edited as necessary the review of systems, past medical/surgical history, family history, social history, and examination findings as documented by others; and I examined the patient myself.  I personally reviewed the relevant tests, images, and reports as documented above.  I formulated and edited as necessary the assessment and plan and discussed the findings and management plan with the patient  and family. - Rebel Lopez MD

## 2022-04-12 NOTE — TELEPHONE ENCOUNTER
Prior Authorization Retail Medication Request    Medication/Dose: brimonidine 0.15% ophthalmic sol    ICD code (if different than what is on RX):    Previously Tried and Failed:    Rationale:      Insurance Name:    Insurance ID:        Pharmacy Information (if different than what is on RX)  Name:    Phone:      See chart

## 2022-04-12 NOTE — PROGRESS NOTES
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 Reyna for glaucoma management     Chief Complaint/Presenting Concern: Here for glaucoma follow up    History of Present Illness:   Ameena Solorzano is a 20 year old patient who presents for glaucoma follow up. She is s/p repeat PK in the right eye 8/18/21. She is here with dad. Both reports her eyes are stable and no new concerns today. Denies eye pain or vision changes.They are also seeing Dr. Lopez today. She is using alphagan, cosopt BID both eyes, and latanoprost at bedtime both eyes. She is on prednisolone QID right eye and FML BID left eye. Erythromycin ointment Q2H right eye. Used cosopt and alphagan this morning.    Today she is not in pain and states that things are going well. Using glaucoma gtts very appropriately - on alphagan, cosopt, xalatan each eye.     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 Pressley anomaly both eyes  - S/P bilateral penetrating keratoplasty (PK) as an infant (2000), with  revision  - S/p EDTA chelation right eye 9/3/2020.  Over the interval, working with Dr. Garcia on scleral lens fittings.  - s/p repeat PK, right eye 8/18/21 - with edema and KNV   - Using PF QID right eye   - Follows w/ Dr. Fam and Dr. Salinas  - Dr. Salinas may consider repeat PKP vs KPro vs nasrin flap vs waiting     3. 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 Congenital nystagmus    5. Dandy Walker Syndrome    6. Exotropia of right eye  - Stable  - Following with Dr. Fam     Plan/Recommendations:    Discussed findings with patient.    Difficult to tell if there is any progression in glaucoma given limited information on ONH status. Recommended EUA repeat to compare ONH photos to latest images performed by Dr. Fam during EUA on 09/03/2020. Will try to retrieve ONH imaging from the time of EUA at the time of Dr. Reyna.     Will plan for the EUA    Continue Xalatan at bedtime each eye     Continue Alphagan twice a day each eye     Continue Cosopt twice a day each eye     Continue steroids as per Dr. Lopez, patient has appointment today.      Schedule EUA     Mel Dunn MD  Ophthalmology Resident, PGY-3  [facility]         Physician Attestation     Attending Physician Attestation:  Complete documentation of historical and exam elements from today's encounter can be found in the full encounter summary report (not reduplicated in this progress note). I personally obtained the chief complaint(s) and history of present illness. I confirmed and edited as necessary the review of systems, past medical/surgical history, family history, social history, and examination findings as documented by others; and I examined the patient myself. I personally reviewed the relevant tests, images, and reports as documented above. I formulated and edited as necessary the assessment and plan and discussed the findings and management plan with the patient and any family members  present at the time of the visit.  Jono Webster M.D., Glaucoma, April 12, 2022

## 2022-04-12 NOTE — NURSING NOTE
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  She states that her vision has seemed stable in both eyes since her last eye exam. Patient denies having any eye discomfort.     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  She states that her vision has seemed stable in both eyes since her last eye exam. Patient denies having any eye discomfort.     She is using:  Xalatan at bedtime each eye  Alphagan twice a day each eye  Cosopt twice a day each eye  FML BID, left eye  Pred Forte QID right eye  Emycin 6x/ day right eye Karyna Gonzalez, COT 7:36 AM April 12, 2022

## 2022-04-13 ENCOUNTER — TELEPHONE (OUTPATIENT)
Dept: OPHTHALMOLOGY | Facility: CLINIC | Age: 22
End: 2022-04-13
Payer: COMMERCIAL

## 2022-04-13 DIAGNOSIS — Z11.59 ENCOUNTER FOR SCREENING FOR OTHER VIRAL DISEASES: Primary | ICD-10-CM

## 2022-04-13 NOTE — TELEPHONE ENCOUNTER
I called patient to schedule surgery with Dr. Jono Webster, I left a voicemail with callback # [phone]

## 2022-04-14 ENCOUNTER — TELEPHONE (OUTPATIENT)
Dept: OPHTHALMOLOGY | Facility: CLINIC | Age: 22
End: 2022-04-14

## 2022-04-14 ENCOUNTER — TELEPHONE (OUTPATIENT)
Dept: OPHTHALMOLOGY | Facility: CLINIC | Age: 22
End: 2022-04-14
Payer: COMMERCIAL

## 2022-04-14 NOTE — TELEPHONE ENCOUNTER
PA Initiation    Medication: brimonidine (ALPHAGAN-P) 0.15 % ophthalmic solution SECONDARY INSURANCE  Insurance Company: [place] Medicaid ([facility]) - Phone [phone] Fax [phone]  Pharmacy Filling the Rx: [facility] - [place], [place] - [facility]  Filling Pharmacy Phone: [phone]  Filling Pharmacy Fax: [phone]  Start Date: 4/14/2022

## 2022-04-14 NOTE — TELEPHONE ENCOUNTER
Prior Authorization Not Needed per Insurance    Medication: brimonidine (ALPHAGAN-P) 0.15 % ophthalmic solution--NO PA NEEDED  Insurance Company: [employer] - Phone [phone] Fax [phone]  Expected CoPay:      Pharmacy Filling the Rx: [facility] - [place], [place] - [facility] [facility]  Pharmacy Notified: Yes  Patient Notified: Yes **Instructed pharmacy to notify patient when script is ready to /ship.**    Per pharmacy, [place].  They will be sending screen shot of what primary is paying to PA team to start secondary insurance PA request.

## 2022-04-14 NOTE — TELEPHONE ENCOUNTER
Returned Andres's voicemail and verified I will take the case for Lyndsay off the surgery schedule as they do not want to proceed since Dr. Rebel Lopez does not have any procedures to perform as well.    Andres stated in his voicemail that he was surprised the case was scheduled. Andres had said they would not go forward with the EUA unless there were other procedures. This  was not made aware of these conditions when sent the orders to schedule.

## 2022-04-15 ENCOUNTER — TELEPHONE (OUTPATIENT)
Dept: OPHTHALMOLOGY | Facility: CLINIC | Age: 22
End: 2022-04-15
Payer: COMMERCIAL

## 2022-04-15 DIAGNOSIS — Q13.4 PETER'S ANOMALY: ICD-10-CM

## 2022-04-15 DIAGNOSIS — Q15.0 CONGENITAL GLAUCOMA: ICD-10-CM

## 2022-04-15 DIAGNOSIS — Z94.7 CORNEA REPLACED BY TRANSPLANT: ICD-10-CM

## 2022-04-15 DIAGNOSIS — H18.30 CORNEAL EPITHELIAL DEFECT: ICD-10-CM

## 2022-04-15 RX ORDER — BRIMONIDINE TARTRATE 1.5 MG/ML
1 SOLUTION/ DROPS OPHTHALMIC 2 TIMES DAILY
Qty: 10 ML | Refills: 3 | Status: SHIPPED | OUTPATIENT
Start: 2022-04-15 | End: 2022-10-14

## 2022-04-15 RX ORDER — LATANOPROST 50 UG/ML
1 SOLUTION/ DROPS OPHTHALMIC AT BEDTIME
Qty: 7.5 ML | Refills: 3 | Status: SHIPPED | OUTPATIENT
Start: 2022-04-15 | End: 2023-05-05

## 2022-04-15 RX ORDER — DORZOLAMIDE HYDROCHLORIDE AND TIMOLOL MALEATE 20; 5 MG/ML; MG/ML
1 SOLUTION/ DROPS OPHTHALMIC 2 TIMES DAILY
Qty: 10 ML | Refills: 7 | Status: SHIPPED | OUTPATIENT
Start: 2022-04-15 | End: 2023-05-16

## 2022-04-15 RX ORDER — ERYTHROMYCIN 5 MG/G
OINTMENT OPHTHALMIC
Qty: 3.5 G | Refills: 10 | Status: SHIPPED | OUTPATIENT
Start: 2022-04-15 | End: 2022-05-27

## 2022-04-15 RX ORDER — PREDNISOLONE ACETATE 10 MG/ML
1 SUSPENSION/ DROPS OPHTHALMIC 4 TIMES DAILY
Qty: 10 ML | Refills: 2 | Status: SHIPPED | OUTPATIENT
Start: 2022-04-15 | End: 2022-08-16

## 2022-04-15 RX ORDER — FLUOROMETHOLONE 0.1 %
SUSPENSION, DROPS(FINAL DOSAGE FORM)(ML) OPHTHALMIC (EYE)
Qty: 15 ML | Refills: 11 | Status: SHIPPED | OUTPATIENT
Start: 2022-04-15 | End: 2023-01-24

## 2022-04-15 NOTE — TELEPHONE ENCOUNTER
6 Rx's sent by Dr. Winchester    Sent brimonidine, cosopt and latanoprost under Dr. Webster and FML, EES ointment and PF under Dr. Jessica Hightower, RN 3:00 PM 04/15/22              [facility] Call Center    Phone Message    May a detailed message be left on voicemail: yes     Reason for Call: Medication Question or concern regarding medication   Prescription Clarification  Name of Medication: All 6 medications from 04/12/22  Prescribing Provider: Dr. Winchester   Pharmacy: [facility]   What on the order needs clarification? Farida calling from [facility] to report that Lyndsay is a Medicaid recipient and Dr. Winchester is not actively enrolled with Medicaid [place] at this time. Is Dr. Morfin able to write these prescriptions and resend them to pharmacy? Please call Farida if there are any questions or concerns. Thanks!          Action Taken: Message routed to:  [facility] ([facility] Eye    Travel Screening: Not Applicable

## 2022-04-21 NOTE — TELEPHONE ENCOUNTER
Provider on script is not enrolled in [place].  Clinic has updated script with new provider.  Will resend in PA with updated provider.

## 2022-04-21 NOTE — TELEPHONE ENCOUNTER
PA Initiation    Medication: brimonidine (ALPHAGAN-P) 0.15 % ophthalmic solution SECONDARY INSURANCE  Insurance Company: [place] Medicaid ([facility]) - Phone [phone] Fax [phone]  Pharmacy Filling the Rx: [facility] - [place], [place] - [facility]  Filling Pharmacy Phone: [phone]  Filling Pharmacy Fax: [phone]  Start Date: 4/14/2022

## 2022-04-22 ENCOUNTER — OFFICE VISIT (OUTPATIENT)
Dept: OPHTHALMOLOGY | Facility: CLINIC | Age: 22
End: 2022-04-22
Attending: OPHTHALMOLOGY
Payer: COMMERCIAL

## 2022-04-22 DIAGNOSIS — H02.206 LAGOPHTHALMOS OF EYELIDS OF BOTH EYES, UNSPECIFIED EYELID, UNSPECIFIED LAGOPHTHALMOS TYPE: ICD-10-CM

## 2022-04-22 DIAGNOSIS — H21.563 PUPIL IRREGULAR OF BOTH EYES: ICD-10-CM

## 2022-04-22 DIAGNOSIS — H52.213 IRREGULAR ASTIGMATISM OF BOTH EYES: ICD-10-CM

## 2022-04-22 DIAGNOSIS — Z94.7 CORNEA REPLACED BY TRANSPLANT: ICD-10-CM

## 2022-04-22 DIAGNOSIS — H54.3 LOW VISION, BOTH EYES: ICD-10-CM

## 2022-04-22 DIAGNOSIS — H55.01 NYSTAGMUS, CONGENITAL: ICD-10-CM

## 2022-04-22 DIAGNOSIS — H50.15 EXOTROPIA, ALTERNATING: ICD-10-CM

## 2022-04-22 DIAGNOSIS — Q15.0 CONGENITAL GLAUCOMA: ICD-10-CM

## 2022-04-22 DIAGNOSIS — Q93.59: ICD-10-CM

## 2022-04-22 DIAGNOSIS — H02.203 LAGOPHTHALMOS OF EYELIDS OF BOTH EYES, UNSPECIFIED EYELID, UNSPECIFIED LAGOPHTHALMOS TYPE: ICD-10-CM

## 2022-04-22 DIAGNOSIS — H52.32 ANISEIKONIA: ICD-10-CM

## 2022-04-22 DIAGNOSIS — H52.31 ANISOMETROPIA: ICD-10-CM

## 2022-04-22 DIAGNOSIS — Q13.4 PETER'S ANOMALY: Primary | ICD-10-CM

## 2022-04-22 PROCEDURE — G0463 HOSPITAL OUTPT CLINIC VISIT: HCPCS | Mod: 25

## 2022-04-22 PROCEDURE — 92015 DETERMINE REFRACTIVE STATE: CPT

## 2022-04-22 PROCEDURE — 99214 OFFICE O/P EST MOD 30 MIN: CPT | Performed by: OPHTHALMOLOGY

## 2022-04-22 ASSESSMENT — REFRACTION_WEARINGRX
OS_AXIS: 120
SPECS_TYPE: SVL
OS_CYLINDER: +3.00
OS_SPHERE: -6.50
OD_AXIS: 120
OD_SPHERE: -12.50
OD_CYLINDER: +6.00

## 2022-04-22 ASSESSMENT — VISUAL ACUITY
OS_CC: 20/60
OS_CC+: -1
METHOD: SNELLEN - LINEAR
OD_CC: 20/400
CORRECTION_TYPE: GLASSES

## 2022-04-22 ASSESSMENT — CONF VISUAL FIELD
OD_SUPERIOR_TEMPORAL_RESTRICTION: 3
OS_SUPERIOR_TEMPORAL_RESTRICTION: 3
METHOD: COUNTING FINGERS
OD_SUPERIOR_NASAL_RESTRICTION: 3
OS_SUPERIOR_NASAL_RESTRICTION: 3

## 2022-04-22 ASSESSMENT — REFRACTION
OD_CYLINDER: +5.50
OS_CYLINDER: +3.50
OD_SPHERE: -14.50
OS_AXIS: 120
OS_SPHERE: -7.00
OD_AXIS: 100

## 2022-04-22 ASSESSMENT — EXTERNAL EXAM - RIGHT EYE: OD_EXAM: NORMAL

## 2022-04-22 ASSESSMENT — PACHYMETRY: OD_CT(UM): 657

## 2022-04-22 ASSESSMENT — SLIT LAMP EXAM - LIDS
COMMENTS: EXOPHTHALMOS R>L
COMMENTS: EXOPHTHALMOS R>L

## 2022-04-22 ASSESSMENT — TONOMETRY
IOP_METHOD: TONOPEN 5% KW
OD_IOP_MMHG: 19
OS_IOP_MMHG: 13

## 2022-04-22 ASSESSMENT — EXTERNAL EXAM - LEFT EYE: OS_EXAM: NORMAL

## 2022-04-22 NOTE — PATIENT INSTRUCTIONS
Get new glasses and wear full time (100% of waking hours).     Continue all current medications.     Call to be seen right away for any worsening eye redness, pain, or vision changes.

## 2022-04-22 NOTE — NURSING NOTE
Chief Complaint(s) and History of Present Illness(es)     Amblyopia Follow Up     Associated symptoms: Negative for droopy eyelid, eye pain and headaches    Comments: + peter's anomaly. Had right cornea transplant redone. Saw dr sanchez last week, still has stiches. Unable to wear CL right now. Light sens stable. Eyes are not bothersome, no redness or tearing. FTGW, wearing old pair of glasses. VA seems okay in glasses.               Comments     Ocular Meds:  Xalatan at bedtime BE (10pm last night)  Alphagan BID   Cosopt BID  Fluorometholone LE BID  Refresh LE bedtime   Pred Forte RE QID   Erythromycin RE 6-8x per/day

## 2022-04-22 NOTE — LETTER
4/22/2022    To: Cailin Maxwell MD  [facility]  [address]  [facility] [address]    Re:  Ameena Solorzano    YOB: 2000    MRN: [id number]    Dear Colleague,     It was my pleasure to see Ameena on 4/22/2022.  In summary, Ameena Solorzano is a 21 year old female who presents with:     Peter's anomaly, post corneal transplant (PKP) both eyes    s/p EDTA chelation and superficial  Keratectomy right eye 9/3/20 (Dr. Salinas)   s/p repeat PK RE (8/18/21) (Jessica)               - S/P bilateral penetrating keratoplasty (PK) in 2000 age 12 days and 14 days     Working on surface health with Dr. Lopez. Last seen 4/12/22 with improvement per his note. Continued diffuse epitheliopathy of the graft surface today. Has been holding off on using the scleral lens due to being post-PKP. May have sutures removed in a few months.   - Continue Fluorometholone twice a day left eye and Prednisolone acetate four times a day right eye.  - Continue Erythromycin ophthalmic ointment right eye 6-8x/day and preservative free artificial tears as needed in between.   - Discussed that she may benefit from a contact lens in the future more for ocular surface health than for visual acuity.   - Follow-up with Dr. Lopez in August as planned.     Congenital glaucoma  Intraocular pressure is 19 right eye and 13 left eye today by tonopen. Unable to visualize the optic nerves today in clinic due to difficulty for Lyndsay to fixation in the necessary position and significant nystagmus as well as some haze from epitheliopathy of the right eye.   - Continue present management: cosopt twice a day, alphagan twice a day, xalatan at bedtime. All both eyes. Established with Dr. Morfin who recommended bilateral eye examination under anesthesia (EUA).  - Family is hoping to delay EUA as anesthetic events are hard on Lyndsay and she may need suture removal soon. Message to Radha Lopez and Navjot to potentially combine  EUA and suture removal.  - I also discussed with Lyndsay's father how difficult it is to examine her optic nerves in clinic and that intraocular pressure measurements are not as reliable post PKP. I encouraged them to schedule the EUA per Dr. Morfin's recommended timeframe. While they hope to combine the EUA with the suture removal they agree with EUA if needed sooner.     Anisometropia and aniseikonia  Irregular astigmatism of both eyes  Low vision, both eyes              Best corrected visual acuity in 2019 ranged from 20/125-20/200 right eye.   Status-post PKP with improved visual acuity to 20/400 right eye today and left eye 20/60.  - Continue full time glasses wear. Updated glasses prescription provided. Get new glasses and wear full time. May need further updates given ocular surface changes right eye that makes cycloplegic refraction less reliable and she may have a shift in prescription as sutures are removed.    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     Exotropia Stable. Monitor.     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 Better dilation today.     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      Thank you for the opportunity to care for Ameena. I have asked her to Return in about 1 year (around 4/22/2023) for Vision & alignment, CRx & Dilated Exam.  Until then, please do not hesitate to contact me or my clinic with any questions or concerns.          Warm regards,          Romina Fam MD                 Pediatric Ophthalmology & Strabismus        Department of Ophthalmology & Visual Neurosciences        [facility]   CC:  MD Rebel Suresh MD Maggie E. Allen

## 2022-04-22 NOTE — PROGRESS NOTES
Chief Complaint(s) and History of Present Illness(es)     Amblyopia Follow Up     Associated symptoms include Negative for droopy eyelid, eye pain and headaches. Additional comments: + peter's anomaly. Had right cornea transplant redone. Saw dr sanchez last week, still has stiches. Unable to wear CL right now. Light sens stable. Eyes are not bothersome, no redness or tearing. FTGW, wearing old pair of glasses. VA seems okay in glasses.               Comments     Ocular Meds:  Xalatan at bedtime BE (10pm last night)  Alphagan BID   Cosopt BID  Fluorometholone LE BID  Refresh LE bedtime   Pred Forte RE QID   Erythromycin RE 6-8x per/day      Chart review:  1/6/22 Dr. Sanchez note  4/12/22 Dr. Sanchez note  4/12/22 Dr. Webster note              Review of systems for the eyes was negative other than the pertinent positives and negatives noted in the HPI. History is obtained from the patient and father.     Primary care: Cailin Maxwell   Referring provider: Referred Self  MALOU JEFFERS is home  Assessment & Plan   Ameena Solorzano is a 21 year old female who presents with:     Peter's anomaly, post corneal transplant (PKP) both eyes    s/p EDTA chelation and superficial  Keratectomy right eye 9/3/20 (Dr. Salinas)   s/p repeat PK RE (8/18/21) (Jessica)               - S/P bilateral penetrating keratoplasty (PK) in 2000 age 12 days and 14 days     Working on surface health with Dr. Sanchez. Last seen 4/12/22 with improvement per his note. Continued diffuse epitheliopathy of the graft surface today. Has been holding off on using the scleral lens due to being post-PKP. May have sutures removed in a few months.   - Continue Fluorometholone twice a day left eye and Prednisolone acetate four times a day right eye.  - Continue Erythromycin ophthalmic ointment right eye 6-8x/day and preservative free artificial tears as needed in between.   - Discussed that she may benefit from a contact lens in the future more for ocular  surface health than for visual acuity.   - Follow-up with Dr. Lopez in August as planned.     Congenital glaucoma  Intraocular pressure is 19 right eye and 13 left eye today by tonopen. Unable to visualize the optic nerves today in clinic due to difficulty for Lyndsay to fixation in the necessary position and significant nystagmus as well as some haze from epitheliopathy of the right eye.   - Continue present management: cosopt twice a day, alphagan twice a day, xalatan at bedtime. All both eyes. Established with Dr. Morfin who recommended bilateral eye examination under anesthesia (EUA).  - Family is hoping to delay EUA as anesthetic events are hard on Lyndsay and she may need suture removal soon. Message to Radha Lopez and Navjot to potentially combine EUA and suture removal.  - I also discussed with Lyndsay's father how difficult it is to examine her optic nerves in clinic and that intraocular pressure measurements are not as reliable post PKP. I encouraged them to schedule the EUA per Dr. Morfin's recommended timeframe. While they hope to combine the EUA with the suture removal they agree with EUA if needed sooner.     Anisometropia and aniseikonia  Irregular astigmatism of both eyes  Low vision, both eyes              Best corrected visual acuity in 2019 ranged from 20/125-20/200 right eye.   Status-post PKP with improved visual acuity to 20/400 right eye today and left eye 20/60.  - Continue full time glasses wear. Updated glasses prescription provided. Get new glasses and wear full time. May need further updates given ocular surface changes right eye that makes cycloplegic refraction less reliable and she may have a shift in prescription as sutures are removed.    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     Exotropia Stable. Monitor.     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 Better dilation  today.     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        Return in about 1 year (around 4/22/2023) for Vision & alignment, CRx & Dilated Exam.  30 minutes spent on the date of the encounter doing chart review, history and exam, documentation and further activities as noted above.    Patient Instructions   Get new glasses and wear full time (100% of waking hours).     Continue all current medications.     Call to be seen right away for any worsening eye redness, pain, or vision changes.      Visit Diagnoses & Orders    ICD-10-CM    1. Peter's anomaly  Q13.89    2. 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 Congenital glaucoma - Both Eyes  Q15.0    4. Anisometropia  H52.31    5. Aniseikonia  H52.32    6. Irregular astigmatism of both eyes  H52.213    7. Nystagmus, congenital  H55.01    8. Low vision, both eyes  H54.3    9. Pupil irregular of both eyes  H21.563    10. 6p partial monosomy syndrome  Q93.59    11. Lagophthalmos of eyelids of both eyes, unspecified eyelid, unspecified lagophthalmos type  H02.203     H02.206    12. Exotropia, alternating  H50.15       Attending Physician Attestation:  Complete documentation of historical and exam elements from today's encounter can be found in the full encounter summary report (not reduplicated in this progress note).  I personally obtained the chief complaint(s) and history of present illness.  I confirmed and edited as necessary the review of systems, past medical/surgical history, family history, social history, and examination findings as documented by others; and I examined the patient myself.  I personally reviewed the relevant tests, images, and reports as documented above.  I formulated and edited as necessary the assessment and plan and discussed the findings and management plan with the patient and family. - Romina Fam MD

## 2022-04-25 NOTE — TELEPHONE ENCOUNTER
PRIOR AUTHORIZATION DENIED    Medication: brimonidine (ALPHAGAN-P) 0.15 % ophthalmic solution SECONDARY INSURANCE--DENIED    Denial Date: 4/21/2022    Denial Rational: Diagnosis is not a medically accepted indication for the medication.      Appeal Information:

## 2022-04-27 ENCOUNTER — TELEPHONE (OUTPATIENT)
Dept: OPHTHALMOLOGY | Facility: CLINIC | Age: 22
End: 2022-04-27
Payer: COMMERCIAL

## 2022-04-27 DIAGNOSIS — Q15.0 CONGENITAL GLAUCOMA: Primary | ICD-10-CM

## 2022-04-27 DIAGNOSIS — Q13.4 PETER'S ANOMALY: ICD-10-CM

## 2022-04-27 RX ORDER — BRIMONIDINE TARTRATE 2 MG/ML
1 SOLUTION/ DROPS OPHTHALMIC 2 TIMES DAILY
Qty: 10 ML | Refills: 3 | Status: ON HOLD | OUTPATIENT
Start: 2022-04-27 | End: 2023-04-17

## 2022-05-27 ENCOUNTER — TELEPHONE (OUTPATIENT)
Dept: OPHTHALMOLOGY | Facility: CLINIC | Age: 22
End: 2022-05-27
Payer: COMMERCIAL

## 2022-05-27 DIAGNOSIS — H18.30 CORNEAL EPITHELIAL DEFECT: ICD-10-CM

## 2022-05-27 RX ORDER — ERYTHROMYCIN 5 MG/G
OINTMENT OPHTHALMIC
Qty: 7 G | Refills: 10 | Status: SHIPPED | OUTPATIENT
Start: 2022-05-27 | End: 2023-01-24

## 2022-05-27 NOTE — TELEPHONE ENCOUNTER
Rx sent under Dr. Pascual/Fellow    Luke Hightower, ЮЛИЯ 9:46 AM 05/27/22         Health Call Center    Phone Message    May a detailed message be left on voicemail: yes     Reason for Call: Medication Question or concern regarding medication   Prescription Clarification  Name of Medication: erythromycin (ROMYCIN) 5 MG/GM ophthalmic ointment    Prescribing Provider: Dr. Lopez      Pharmacy: [address]     What on the order needs clarification? Rx was sent to pharmacy signed by Dr. Winchester, however the [facility] will not cover it with Dr. Winchester not enrolled in [facility]. The pharmacy will need to have another provider sign the Rx and either send over another order or call the pharmacy directly and speak with a pharmacist to authorize the Rx.           Action Taken: Message routed to:  [facility] eye    Travel Screening: Not Applicable

## 2022-08-16 ENCOUNTER — OFFICE VISIT (OUTPATIENT)
Dept: OPHTHALMOLOGY | Facility: CLINIC | Age: 22
End: 2022-08-16
Attending: OPHTHALMOLOGY
Payer: COMMERCIAL

## 2022-08-16 DIAGNOSIS — Q15.0 CONGENITAL GLAUCOMA: ICD-10-CM

## 2022-08-16 DIAGNOSIS — Z94.7 CORNEA REPLACED BY TRANSPLANT: Primary | ICD-10-CM

## 2022-08-16 PROCEDURE — 99214 OFFICE O/P EST MOD 30 MIN: CPT | Mod: GC | Performed by: OPHTHALMOLOGY

## 2022-08-16 PROCEDURE — G0463 HOSPITAL OUTPT CLINIC VISIT: HCPCS

## 2022-08-16 RX ORDER — PREDNISOLONE ACETATE 10 MG/ML
1 SUSPENSION/ DROPS OPHTHALMIC 3 TIMES DAILY
Qty: 10 ML | Refills: 11 | Status: SHIPPED | OUTPATIENT
Start: 2022-08-16 | End: 2023-01-24

## 2022-08-16 ASSESSMENT — SLIT LAMP EXAM - LIDS
COMMENTS: EXOPHTHALMOS R>L
COMMENTS: EXOPHTHALMOS R>L

## 2022-08-16 ASSESSMENT — TONOMETRY
OD_IOP_MMHG: 22
OD_IOP_MMHG: 22
OS_IOP_MMHG: 13
IOP_METHOD: TONOPEN
OD_IOP_MMHG: 26
OD_IOP_MMHG: 26
IOP_METHOD: TONOPEN
IOP_METHOD: TONOPEN
OS_IOP_MMHG: 14
OS_IOP_MMHG: X
IOP_METHOD: TONOPEN
OS_IOP_MMHG: X

## 2022-08-16 ASSESSMENT — VISUAL ACUITY
OD_PH_CC: 20/400
OS_PH_CC: 20/70
METHOD: SNELLEN - LINEAR
OS_CC: 20/70
CORRECTION_TYPE: GLASSES
OD_CC: 20/600
OS_PH_CC+: +2

## 2022-08-16 ASSESSMENT — EXTERNAL EXAM - LEFT EYE: OS_EXAM: PROTOSIS R>L, FAIR LID CLOSURE

## 2022-08-16 ASSESSMENT — CONF VISUAL FIELD
OD_SUPERIOR_TEMPORAL_RESTRICTION: 3
OS_SUPERIOR_NASAL_RESTRICTION: 3
OS_SUPERIOR_TEMPORAL_RESTRICTION: 3
OD_SUPERIOR_NASAL_RESTRICTION: 3

## 2022-08-16 NOTE — NURSING NOTE
Chief Complaints and History of Present Illnesses   Patient presents with     Follow Up     Peter's anomaly, post corneal transplant (PKP) both eyes               s/p EDTA chelation and superficial  Keratectomy right eye 9/3/20 (Dr. Salinas)              s/p repeat PK RE (8/18/21) (Jessica)              S/P bilateral penetrating keratoplasty (PK) in 2000 age 12 days and 14 days     Chief Complaint(s) and History of Present Illness(es)     Follow Up     Laterality: both eyes    Quality: blurred vision    Timing: throughout the day    Associated symptoms: Negative for eye pain, tearing, itching, foreign body sensation and burning    Pain scale: 0/10    Comments: Peter's anomaly, post corneal transplant (PKP) both eyes               s/p EDTA chelation and superficial  Keratectomy right eye 9/3/20 (Dr. Salinas)              s/p repeat PK RE (8/18/21) (Jessica)              S/P bilateral penetrating keratoplasty (PK) in 2000 age 12 days and 14 days              Comments     Peter's anomaly, post corneal transplant (PKP) both eyes               s/p EDTA chelation and superficial  Keratectomy right eye 9/3/20 (Dr. Salinas)              s/p repeat PK RE (8/18/21) (Jessica)              S/P bilateral penetrating keratoplasty (PK) in 2000 age 12 days and 14 days  Pt present with father.  Pt states no changes in VA / states instilling eye meds as instructed / states BE comfortable with no FB sensation / wears PG full time, no CTL.  Elana Ackerman, MARILUZ COT 8:19 AM 08/16/2022

## 2022-08-16 NOTE — PROGRESS NOTES
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     Interval Hx:   s/p EDTA chelation/ SK  right eye 9/3/2020.  S/p PK right eye 8/18/21.  Post PKP(8/18/21)  Patient is here with father. No changes in vision. Feels that her eyes are comfortable without pain or reported issues. Also following with DR. Webster for consideration of EUA to evaluate optic nerves.     Current Ocular Meds:  Cosopt BID, OU  Alphagan BID OU  Xalatan QHS OU  FML BID left eye only   Pred forte QID right eye   Erythromycin ointment Q2 hours daily right eye    Anthony ointment at nighttime left eye   Artificial tears PRN each eye      Assessment and Plan:  # s/p EDTA chelation and superficial  Keratectomy right eye 9/3/20 (Dr. Salinas)     # Pressley anomaly both eyes 2000               - s/p repeat PK RE (8/18/21) (Jessica)               - S/P bilateral penetrating keratoplasty (PK) in 2000 age 12 days and 14 days                -  s/p EDTA chelation right eye (See above), with remaining scarring.               - follows with Dr. Fam,  Dr. Webster.                - Previously saw Dr. Garcia prior to PKP, though has not seen again since     8/18/21 PKP right eye.     8/26/21: Post right eye PKP 8/18/21. Epi defect healing 40%. Will continue tx. Disc possible need for bandage CL.     9/3/21. 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 Will continue meds (stop right eye FML). Pt to continue shield at night with glasses during day.    10/13/21: EAU for loose suture removal.  Doing well.      1/6/22: pt doing well overall.  Vision a bit improved but surface quite dry. No Epi defect over graft. Will continue jess for dryness on surface q 2 hours (and include while at school). Disc need for tx of dry ocular surface.    4/12/22: Vision stable Surface dryness, no epi defects.    8/16/22: VA somewhat worsened, though subjective vision stability. Diffuse surface dryness       PLAN:  - Reduce Pred Forte TID right eye   - Continue Emycin Q2H right eye   - Continue FML BID left eye   - Continue Anthony jess nightly left eye  - Continue glaucoma meds each eye  - EUA with Dr. Webster to further assess cornea and possible suture removal - Not needed today (8/16/22)  -Continue each eye lubrication.     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                        - with thin bleb, but chay neg, stable, monitor                        - minimize eye rubbing     # Glaucoma, each eye                         - on xalatan, alphagan, and cosopt (timolol and dorzolamide separately now) OU                        - intraocular pressure good and stable                        - established care with Eleanor, plan for EUA in the near future                        - IOP good today    # Lagophthalmos, each eye                        - clear corneas, pt comfortable                         - continue refresh at bedtime both eyes     RTC: 3 -4 months - call sooner with any problems    John Merida MD  Fellow, Cornea, External Disease and Refractive Surgery  Department of Ophthalmology  [facility]      Attending Physician Attestation:  Complete documentation of historical and exam elements from today's encounter can be found in the full encounter summary report (not reduplicated in this progress note).  I personally obtained the chief complaint(s) and history of present illness.  I confirmed and edited as necessary the review of systems, past medical/surgical history, family history, social history, and examination findings as documented by others; and I examined the patient myself.  I personally reviewed the relevant tests, images, and reports as documented above.  I formulated and edited as necessary the assessment and plan and discussed the findings and management plan with the patient and family. - Rebel Lopez MD

## 2022-08-20 ENCOUNTER — HEALTH MAINTENANCE LETTER (OUTPATIENT)
Age: 22
End: 2022-08-20

## 2022-10-03 DIAGNOSIS — Q13.4 PETER'S ANOMALY: ICD-10-CM

## 2022-10-16 RX ORDER — BRIMONIDINE TARTRATE 1.5 MG/ML
SOLUTION/ DROPS OPHTHALMIC
Qty: 10 ML | Refills: 3 | Status: SHIPPED | OUTPATIENT
Start: 2022-10-16 | End: 2023-09-27

## 2022-11-20 ENCOUNTER — HEALTH MAINTENANCE LETTER (OUTPATIENT)
Age: 22
End: 2022-11-20

## 2022-12-07 ENCOUNTER — TELEPHONE (OUTPATIENT)
Dept: OPHTHALMOLOGY | Facility: CLINIC | Age: 22
End: 2022-12-07

## 2023-01-24 ENCOUNTER — OFFICE VISIT (OUTPATIENT)
Dept: OPHTHALMOLOGY | Facility: CLINIC | Age: 23
End: 2023-01-24
Attending: OPHTHALMOLOGY
Payer: COMMERCIAL

## 2023-01-24 DIAGNOSIS — H18.30 CORNEAL EPITHELIAL DEFECT: ICD-10-CM

## 2023-01-24 DIAGNOSIS — Z94.7 CORNEA REPLACED BY TRANSPLANT: ICD-10-CM

## 2023-01-24 PROCEDURE — G0463 HOSPITAL OUTPT CLINIC VISIT: HCPCS

## 2023-01-24 PROCEDURE — 99213 OFFICE O/P EST LOW 20 MIN: CPT | Performed by: OPHTHALMOLOGY

## 2023-01-24 RX ORDER — ERYTHROMYCIN 5 MG/G
OINTMENT OPHTHALMIC
Qty: 7 G | Refills: 10 | Status: SHIPPED | OUTPATIENT
Start: 2023-01-24 | End: 2023-05-30

## 2023-01-24 RX ORDER — PREDNISOLONE ACETATE 10 MG/ML
1 SUSPENSION/ DROPS OPHTHALMIC 3 TIMES DAILY
Qty: 10 ML | Refills: 11 | Status: SHIPPED | OUTPATIENT
Start: 2023-01-24 | End: 2023-05-30

## 2023-01-24 RX ORDER — FLUOROMETHOLONE 0.1 %
SUSPENSION, DROPS(FINAL DOSAGE FORM)(ML) OPHTHALMIC (EYE)
Qty: 15 ML | Refills: 11 | Status: SHIPPED | OUTPATIENT
Start: 2023-01-24 | End: 2023-09-05

## 2023-01-24 ASSESSMENT — CONF VISUAL FIELD
OD_SUPERIOR_TEMPORAL_RESTRICTION: 1
OD_SUPERIOR_NASAL_RESTRICTION: 1
OS_SUPERIOR_NASAL_RESTRICTION: 1
OS_SUPERIOR_TEMPORAL_RESTRICTION: 1
OD_INFERIOR_NASAL_RESTRICTION: 3

## 2023-01-24 ASSESSMENT — VISUAL ACUITY
OS_CC: 20/80
OD_CC: 20/600
OD_PH_CC: 20/500
METHOD: SNELLEN - SINGLE

## 2023-01-24 ASSESSMENT — EXTERNAL EXAM - LEFT EYE: OS_EXAM: PROTOSIS R>L, FAIR LID CLOSURE

## 2023-01-24 ASSESSMENT — TONOMETRY
OD_IOP_MMHG: 25
IOP_METHOD: ICARE
OS_IOP_MMHG: 09

## 2023-01-24 ASSESSMENT — SLIT LAMP EXAM - LIDS
COMMENTS: EXOPHTHALMOS R>L
COMMENTS: EXOPHTHALMOS R>L

## 2023-01-24 NOTE — PROGRESS NOTES
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     Interval Hx: Reports that her vision is about the same as last visit. No issues with her current drop regimen as below. Patient's father expressed interest in EUA as previously noted, though has not been successful in scheduling. No pain. No f/f/c.      Current Ocular Meds:  Cosopt BID, OU  Alphagan BID OU  Xalatan QHS OU  FML BID left eye only   Pred forte TID right eye   Erythromycin ointment Q2 hours daily right eye    Anthony ointment at nighttime left eye   Artificial tears PRN each eye      Assessment and Plan:  # s/p EDTA chelation and superficial  Keratectomy right eye 9/3/20 (Dr. Salinas)     # Pressley anomaly both eyes 2000               - s/p repeat PK RE (8/18/21) (Jessica)               - S/P bilateral penetrating keratoplasty (PK) in 2000 age 12 days and 14 days                -  s/p EDTA chelation right eye (See above), with remaining scarring.               - follows with Dr. Fam,  Dr. Webster.                - Previously saw Dr. Garcia prior to PKP, though has not seen again since    8/18/21 PKP right eye.  8/26/21: Post right eye PKP 8/18/21. Epi defect healing 40%. Will continue tx. Disc possible need for bandage CL.  9/3/21. 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 Will continue meds (stop right eye FML). Pt to continue shield at night with glasses during day.  10/13/21: EAU for loose suture removal.  Doing well.    1/6/22: pt doing well overall.  Vision a bit improved but surface quite dry. No Epi defect over graft. Will continue jess for dryness on surface q 2 hours (and include while at school). Disc need for tx of dry ocular surface.  4/12/22: Vision stable Surface dryness, no epi defects.  8/16/22: VA somewhat worsened, though subjective vision stability. Diffuse surface dryness   1/24/23: VA stable, diffuse surface dryness, stable     PLAN:  - continue Pred  Forte TID right eye   - Continue Emycin Q2H right eye   - Continue FML BID left eye   - Continue Anthony jess nightly left eye  - Continue glaucoma meds each eye  - EUA with Dr. Webster to further assess cornea and possible suture removal - will schedule  - Continue each eye lubrication.     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                        - with thin bleb, but chay neg, stable, monitor                        - minimize eye rubbing     # Glaucoma, each eye                         - on xalatan, alphagan, and cosopt (timolol and dorzolamide separately now) OU                        - intraocular pressure good and stable                        - established care with Eleanor, plan for EUA in the near future                        - IOP good today    # Lagophthalmos, each eye                        - clear corneas, pt comfortable                         - continue refresh at bedtime both eyes     RTC:4 months - call sooner with any problems  - will schedule with Dr Webster within 1 - 2 months or sooner.    John Merida MD  Fellow, Cornea, External Disease and Refractive Surgery  Department of Ophthalmology  [facility]      Attending Physician Attestation:  Complete documentation of historical and exam elements from today's encounter can be found in the full encounter summary report (not reduplicated in this progress note).  I personally obtained the chief complaint(s) and history of present illness.  I confirmed and edited as necessary the review of systems, past medical/surgical history, family history, social history, and examination findings as documented by others; and I examined the patient myself.  I personally reviewed the relevant tests, images, and reports as documented above.  I formulated and edited as necessary the assessment and plan and discussed the findings and management plan with the patient and family. - Rebel Lopez MD

## 2023-01-27 ENCOUNTER — TELEPHONE (OUTPATIENT)
Dept: OPHTHALMOLOGY | Facility: CLINIC | Age: 23
End: 2023-01-27
Payer: COMMERCIAL

## 2023-01-27 NOTE — TELEPHONE ENCOUNTER
I called Lyndsay Campos's father to schedule surgery with Dr. Jono Webster, I left a voicemail with callback # [phone]

## 2023-01-27 NOTE — TELEPHONE ENCOUNTER
Patient is scheduled for surgery with Dr. Jono Webster     Spoke with: Andres (father)     Date of Surgery: 03/20     Location: [facility]:  [address] [place] [id number]     H&P will be completed at: [facility]     Post Op Not Needed     Surgery packet was mailed 01/27     Additional comments: Advised RN will call 1 - 3 business days prior with arrival time and instructions. Informed patient they will need an adult  and responsible adult to stay with for 24 hours following surgery

## 2023-02-24 ENCOUNTER — TELEPHONE (OUTPATIENT)
Dept: OPHTHALMOLOGY | Facility: CLINIC | Age: 23
End: 2023-02-24
Payer: COMMERCIAL

## 2023-02-24 NOTE — TELEPHONE ENCOUNTER
I called Andres to reschedule surgery with Dr. Jono Webster, I left a voicemail with callback # [phone]. Looking to move EUA up a week to 03/13.

## 2023-03-12 ENCOUNTER — ANESTHESIA EVENT (OUTPATIENT)
Dept: SURGERY | Facility: CLINIC | Age: 23
End: 2023-03-12
Payer: COMMERCIAL

## 2023-03-13 ENCOUNTER — ANESTHESIA (OUTPATIENT)
Dept: SURGERY | Facility: CLINIC | Age: 23
End: 2023-03-13
Payer: COMMERCIAL

## 2023-03-13 ENCOUNTER — HOSPITAL ENCOUNTER (OUTPATIENT)
Facility: CLINIC | Age: 23
Discharge: HOME | End: 2023-03-13
Attending: OPHTHALMOLOGY | Admitting: OPHTHALMOLOGY
Payer: COMMERCIAL

## 2023-03-13 VITALS
WEIGHT: 114.2 LBS | BODY MASS INDEX: 22.42 KG/M2 | HEART RATE: 87 BPM | OXYGEN SATURATION: 98 % | HEIGHT: 60 IN | TEMPERATURE: 98.6 F | DIASTOLIC BLOOD PRESSURE: 47 MMHG | RESPIRATION RATE: 22 BRPM | SYSTOLIC BLOOD PRESSURE: 104 MMHG

## 2023-03-13 DIAGNOSIS — Q15.0 CONGENITAL GLAUCOMA OF BOTH EYES: ICD-10-CM

## 2023-03-13 PROCEDURE — 92250 FUNDUS PHOTOGRAPHY W/I&R: CPT | Mod: 26 | Performed by: OPHTHALMOLOGY

## 2023-03-13 PROCEDURE — 250N000011 HC RX IP 250 OP 636: Performed by: NURSE ANESTHETIST, CERTIFIED REGISTERED

## 2023-03-13 PROCEDURE — 15851 REMOVAL SUTR/STAPLE REQ ANES: CPT | Mod: GC | Performed by: OPHTHALMOLOGY

## 2023-03-13 PROCEDURE — 92285 EXTERNAL OCULAR PHOTOGRAPHY: CPT | Mod: 26 | Performed by: OPHTHALMOLOGY

## 2023-03-13 PROCEDURE — 258N000003 HC RX IP 258 OP 636: Performed by: NURSE ANESTHETIST, CERTIFIED REGISTERED

## 2023-03-13 PROCEDURE — 710N000012 HC RECOVERY PHASE 2, PER MINUTE: Performed by: OPHTHALMOLOGY

## 2023-03-13 PROCEDURE — 250N000009 HC RX 250: Performed by: NURSE ANESTHETIST, CERTIFIED REGISTERED

## 2023-03-13 PROCEDURE — 250N000009 HC RX 250: Performed by: OPHTHALMOLOGY

## 2023-03-13 PROCEDURE — 250N000013 HC RX MED GY IP 250 OP 250 PS 637: Performed by: ANESTHESIOLOGY

## 2023-03-13 PROCEDURE — 370N000017 HC ANESTHESIA TECHNICAL FEE, PER MIN: Performed by: OPHTHALMOLOGY

## 2023-03-13 PROCEDURE — 999N000141 HC STATISTIC PRE-PROCEDURE NURSING ASSESSMENT: Performed by: OPHTHALMOLOGY

## 2023-03-13 PROCEDURE — 250N000025 HC SEVOFLURANE, PER MIN: Performed by: OPHTHALMOLOGY

## 2023-03-13 PROCEDURE — 710N000010 HC RECOVERY PHASE 1, LEVEL 2, PER MIN: Performed by: OPHTHALMOLOGY

## 2023-03-13 PROCEDURE — 250N000009 HC RX 250: Performed by: ANESTHESIOLOGY

## 2023-03-13 PROCEDURE — 360N000074 HC SURGERY LEVEL 1, PER MIN: Performed by: OPHTHALMOLOGY

## 2023-03-13 RX ORDER — PROPOFOL 10 MG/ML
INJECTION, EMULSION INTRAVENOUS PRN
Status: DISCONTINUED | OUTPATIENT
Start: 2023-03-13 | End: 2023-03-13

## 2023-03-13 RX ORDER — SODIUM CHLORIDE, SODIUM LACTATE, POTASSIUM CHLORIDE, CALCIUM CHLORIDE 600; 310; 30; 20 MG/100ML; MG/100ML; MG/100ML; MG/100ML
INJECTION, SOLUTION INTRAVENOUS CONTINUOUS PRN
Status: DISCONTINUED | OUTPATIENT
Start: 2023-03-13 | End: 2023-03-13

## 2023-03-13 RX ORDER — FENTANYL CITRATE 50 UG/ML
INJECTION, SOLUTION INTRAMUSCULAR; INTRAVENOUS PRN
Status: DISCONTINUED | OUTPATIENT
Start: 2023-03-13 | End: 2023-03-13

## 2023-03-13 RX ORDER — DEXMEDETOMIDINE HYDROCHLORIDE 4 UG/ML
INJECTION, SOLUTION INTRAVENOUS PRN
Status: DISCONTINUED | OUTPATIENT
Start: 2023-03-13 | End: 2023-03-13

## 2023-03-13 RX ORDER — CYCLOPENTOLAT/TROPIC/PHENYLEPH 1%-1%-2.5%
1 DROPS (EA) OPHTHALMIC (EYE)
Status: COMPLETED | OUTPATIENT
Start: 2023-03-13 | End: 2023-03-13

## 2023-03-13 RX ORDER — MOXIFLOXACIN 5 MG/ML
SOLUTION/ DROPS OPHTHALMIC PRN
Status: DISCONTINUED | OUTPATIENT
Start: 2023-03-13 | End: 2023-03-13 | Stop reason: HOSPADM

## 2023-03-13 RX ORDER — DEXAMETHASONE SODIUM PHOSPHATE 4 MG/ML
INJECTION, SOLUTION INTRA-ARTICULAR; INTRALESIONAL; INTRAMUSCULAR; INTRAVENOUS; SOFT TISSUE PRN
Status: DISCONTINUED | OUTPATIENT
Start: 2023-03-13 | End: 2023-03-13

## 2023-03-13 RX ORDER — LIDOCAINE HYDROCHLORIDE 20 MG/ML
INJECTION, SOLUTION INFILTRATION; PERINEURAL PRN
Status: DISCONTINUED | OUTPATIENT
Start: 2023-03-13 | End: 2023-03-13

## 2023-03-13 RX ORDER — SODIUM CHLORIDE, SODIUM LACTATE, POTASSIUM CHLORIDE, CALCIUM CHLORIDE 600; 310; 30; 20 MG/100ML; MG/100ML; MG/100ML; MG/100ML
INJECTION, SOLUTION INTRAVENOUS CONTINUOUS
Status: DISCONTINUED | OUTPATIENT
Start: 2023-03-13 | End: 2023-03-13 | Stop reason: HOSPADM

## 2023-03-13 RX ORDER — ACETAMINOPHEN 325 MG/1
650 TABLET ORAL ONCE
Status: DISCONTINUED | OUTPATIENT
Start: 2023-03-13 | End: 2023-03-13

## 2023-03-13 RX ORDER — MIDAZOLAM HYDROCHLORIDE 2 MG/ML
20 SYRUP ORAL ONCE
Status: COMPLETED | OUTPATIENT
Start: 2023-03-13 | End: 2023-03-13

## 2023-03-13 RX ORDER — PROPOFOL 10 MG/ML
INJECTION, EMULSION INTRAVENOUS CONTINUOUS PRN
Status: DISCONTINUED | OUTPATIENT
Start: 2023-03-13 | End: 2023-03-13

## 2023-03-13 RX ORDER — EPHEDRINE SULFATE 50 MG/ML
INJECTION, SOLUTION INTRAMUSCULAR; INTRAVENOUS; SUBCUTANEOUS PRN
Status: DISCONTINUED | OUTPATIENT
Start: 2023-03-13 | End: 2023-03-13

## 2023-03-13 RX ORDER — ONDANSETRON 2 MG/ML
INJECTION INTRAMUSCULAR; INTRAVENOUS PRN
Status: DISCONTINUED | OUTPATIENT
Start: 2023-03-13 | End: 2023-03-13

## 2023-03-13 RX ORDER — GLYCOPYRROLATE 0.2 MG/ML
INJECTION, SOLUTION INTRAMUSCULAR; INTRAVENOUS PRN
Status: DISCONTINUED | OUTPATIENT
Start: 2023-03-13 | End: 2023-03-13

## 2023-03-13 RX ORDER — BALANCED SALT SOLUTION 6.4; .75; .48; .3; 3.9; 1.7 MG/ML; MG/ML; MG/ML; MG/ML; MG/ML; MG/ML
SOLUTION OPHTHALMIC PRN
Status: DISCONTINUED | OUTPATIENT
Start: 2023-03-13 | End: 2023-03-13 | Stop reason: HOSPADM

## 2023-03-13 RX ORDER — SCOLOPAMINE TRANSDERMAL SYSTEM 1 MG/1
1 PATCH, EXTENDED RELEASE TRANSDERMAL
Status: DISCONTINUED | OUTPATIENT
Start: 2023-03-13 | End: 2023-03-13 | Stop reason: HOSPADM

## 2023-03-13 RX ADMIN — Medication 5 MG: at 10:36

## 2023-03-13 RX ADMIN — PROPOFOL 50 MG: 10 INJECTION, EMULSION INTRAVENOUS at 10:17

## 2023-03-13 RX ADMIN — ACETAMINOPHEN 650 MG: 325 SOLUTION ORAL at 08:50

## 2023-03-13 RX ADMIN — SODIUM CHLORIDE, POTASSIUM CHLORIDE, SODIUM LACTATE AND CALCIUM CHLORIDE: 600; 310; 30; 20 INJECTION, SOLUTION INTRAVENOUS at 10:15

## 2023-03-13 RX ADMIN — MIDAZOLAM HYDROCHLORIDE 20 MG: 2 SYRUP ORAL at 08:50

## 2023-03-13 RX ADMIN — Medication 1 DROP: at 09:10

## 2023-03-13 RX ADMIN — PROPOFOL 100 MCG/KG/MIN: 10 INJECTION, EMULSION INTRAVENOUS at 10:20

## 2023-03-13 RX ADMIN — FENTANYL CITRATE 25 MCG: 50 INJECTION, SOLUTION INTRAMUSCULAR; INTRAVENOUS at 10:13

## 2023-03-13 RX ADMIN — MIDAZOLAM 2 MG: 1 INJECTION INTRAMUSCULAR; INTRAVENOUS at 10:13

## 2023-03-13 RX ADMIN — DEXMEDETOMIDINE 20 MCG: 100 INJECTION, SOLUTION, CONCENTRATE INTRAVENOUS at 10:14

## 2023-03-13 RX ADMIN — Medication 1 DROP: at 09:15

## 2023-03-13 RX ADMIN — Medication 5 MG: at 10:27

## 2023-03-13 RX ADMIN — ONDANSETRON 4 MG: 2 INJECTION INTRAMUSCULAR; INTRAVENOUS at 10:15

## 2023-03-13 RX ADMIN — GLYCOPYRROLATE 0.1 MG: 0.2 INJECTION, SOLUTION INTRAMUSCULAR; INTRAVENOUS at 10:43

## 2023-03-13 RX ADMIN — Medication 5 MG: at 10:39

## 2023-03-13 RX ADMIN — DEXAMETHASONE SODIUM PHOSPHATE 4 MG: 4 INJECTION, SOLUTION INTRA-ARTICULAR; INTRALESIONAL; INTRAMUSCULAR; INTRAVENOUS; SOFT TISSUE at 10:29

## 2023-03-13 RX ADMIN — PROPOFOL 100 MG: 10 INJECTION, EMULSION INTRAVENOUS at 10:16

## 2023-03-13 RX ADMIN — LIDOCAINE HYDROCHLORIDE 20 MG: 20 INJECTION, SOLUTION INFILTRATION; PERINEURAL at 10:15

## 2023-03-13 RX ADMIN — PHENYLEPHRINE HYDROCHLORIDE 50 MCG: 10 INJECTION INTRAVENOUS at 10:38

## 2023-03-13 RX ADMIN — SCOPALAMINE 1 PATCH: 1 PATCH, EXTENDED RELEASE TRANSDERMAL at 09:40

## 2023-03-13 RX ADMIN — Medication 1 DROP: at 09:20

## 2023-03-13 ASSESSMENT — ACTIVITIES OF DAILY LIVING (ADL)
ADLS_ACUITY_SCORE: 35
ADLS_ACUITY_SCORE: 35

## 2023-03-13 NOTE — ANESTHESIA PREPROCEDURE EVALUATION
Anesthesia Pre-Procedure Evaluation    Patient: Ameena Solorzano   MRN: [id number] : 2000        Procedure : Procedure(s):  EXAM UNDER ANESTHESIA, EYE BILATERAL          Past Medical History:   Diagnosis Date     6p partial monosomy syndrome      Dandy-Walker syndrome (H)      Dandy-Walker syndrome (H)      Glaucoma      Immune deficiency disorder (H)      Nystagmus      Peter's anomaly      PONV (postoperative nausea and vomiting)      Ptosis      Strabismus       Past Surgical History:   Procedure Laterality Date     EXAM UNDER ANESTHESIA EYE(S) Bilateral 9/3/2020    Procedure: EXAM UNDER ANESTHESIA, BILATERAL EYE, WITH RETCAM PHOTOS AND A/B ULTRASOUND;  Surgeon: Romina Fam MD;  Location: [facility] OR     EXAM UNDER ANESTHESIA EYE(S) Right 2021    Procedure: Exam under anesthesia eye(s);  Surgeon: Rebel Lopez MD;  Location: [facility] OR     EXAM UNDER ANESTHESIA EYE(S) Bilateral 10/13/2021    Procedure: Bilateral Eye Exam Under Anesthesia;  Surgeon: Rebel Lopez MD;  Location: [facility] OR     g tube removed  2002     GASTROSTOMY TUBE  2001     HERNIA REPAIR  3/2009     HERNIA REPAIR  2009     KERATOPLASTY PENETRATING      BE     KERATOPLASTY PENETRATING Right 2021    Procedure: KERATOPLASTY, PENETRATING right eye;  Surgeon: Rebel Lopez MD;  Location: [facility] OR     PE TUBES      x 5     REMOVE SUTURE EYE POST PROCEDURE Right 10/13/2021    Procedure: Suture Removal Right Eye;  Surgeon: Rebel Lopez MD;  Location: [facility]     REPAIR VENTRICULAR SEPTAL DEFECT  2001     REVISE SHUNT VENTRICULARPERITONEAL CHILD  2001     SCRUB ETHYLENEDIAMENETETRAACETIC (EDTA) Right 9/3/2020    Procedure: 1. ETHYLENEDIAMINETETRAACETIC ACID Chelation,  2. Superficial keratectomy,  3. Placement of bandage contact lens (Kontour 20 mm),;  Surgeon: Carter Salinas MD;  Location: [facility] OR     TYMPANOPLASTY, RT/LT Left 2007     TYMPANOPLASTY, RT/LT Right 2008     [facility]  ANESTH,CORNEAL TRANSPLANT        Allergies   Allergen Reactions     Latex      Seasonal Allergies       Social History     Tobacco Use     Smoking status: Never     Smokeless tobacco: Never   Substance Use Topics     Alcohol use: Never      Wt Readings from Last 1 Encounters:   03/13/23 51.8 kg (114 lb 3.2 oz)        Anesthesia Evaluation   Pt has had prior anesthetic. Type: General.    History of anesthetic complications (high anxiety in medical setting)  - PONV.      ROS/MED HX  ENT/Pulmonary:     (+) asthma (well controlled, on Montelukast)  (-) sleep apnea (in infancy only)   Neurologic: Comment:   - congenital glaucoma, Peter's anomaly  - Dandy walker syndrome  - requires hearing aides    (+) Developmental delay,     Cardiovascular: Comment:   - S/p VSD repair at 4 weeks of age, known murmur  - Mild Aortic insufficiency and AORTIC STENOSIS, normal function    (+) -----valvular problems/murmurs type: AS and AI both mild.     METS/Exercise Tolerance: 3 - Able to walk 1-2 blocks without stopping    Hematologic:  - neg hematologic  ROS     Musculoskeletal:  - neg musculoskeletal ROS     GI/Hepatic:  - neg GI/hepatic ROS     Renal/Genitourinary:  - neg Renal ROS     Endo:  - neg endo ROS     Psychiatric/Substance Use:     (+) psychiatric history anxiety     Infectious Disease:  - neg infectious disease ROS     Malignancy:  - neg malignancy ROS     Other:      (+) , other significant disability Blind (Dandy Walker syndrome),          Physical Exam    Airway        Mallampati: II   TM distance: > 3 FB   Neck ROM: full   Mouth opening: > 3 cm    Respiratory Devices and Support         Dental       (+) Minor Abnormalities - some fillings, tiny chips      Cardiovascular          Rhythm and rate: regular and normal   (+) murmur       Pulmonary           breath sounds clear to auscultation           OUTSIDE LABS:  CBC:   Lab Results   Component Value Date    WBC 5.9 01/11/2006    HGB 10.9 01/11/2006    HCT 32.9 01/11/2006      01/11/2006     BMP: No results found for: NA, POTASSIUM, CHLORIDE, CO2, BUN, CR, GLC  COAGS: No results found for: PTT, INR, FIBR  POC:   Lab Results   Component Value Date    HCG Negative 08/18/2021     HEPATIC: No results found for: ALBUMIN, PROTTOTAL, ALT, AST, GGT, ALKPHOS, BILITOTAL, BILIDIRECT, GILBERTO  OTHER: No results found for: PH, LACT, A1C, LEONARD, PHOS, MAG, LIPASE, AMYLASE, TSH, T4, T3, CRP, SED    Anesthesia Plan    ASA Status:  3   NPO Status:  NPO Appropriate    Anesthesia Type: General.     - Airway: LMA   Induction: Intravenous.   Maintenance: Balanced.        Consents    Anesthesia Plan(s) and associated risks, benefits, and realistic alternatives discussed. Questions answered and patient/representative(s) expressed understanding.    - Discussed:     - Discussed with:  Parent (Mother and/or Father), Patient, Legal Guardian (Parents are legal guardians)      - Extended Intubation/Ventilatory Support Discussed: No.      - Patient is DNR/DNI Status: No    Use of blood products discussed: No .     Postoperative Care    Pain management: IV analgesics.   PONV prophylaxis: Ondansetron (or other 5HT-3), Dexamethasone or Solumedrol, Background Propofol Infusion     Comments:    Other Comments: Discussed common and potentially harmful risks for General Anesthesia.   These risks include, but were not limited to: Conversion to secured airway, Sore throat, Airway injury, Dental injury, Aspiration, Respiratory issues (Bronchospasm, Laryngospasm, Desaturation), Hemodynamic issues (Arrhythmia, Hypotension, Ischemia), Potential long term consequences of respiratory and hemodynamic issues, PONV, Emergence delirium/agitation  Risks of invasive procedures were not discussed: N/A    All questions were answered.            Edward Ann MD

## 2023-03-13 NOTE — OP NOTE
OPHTHALMOLOGY OPERATIVE REPORT    PATIENT:  Ameena Solorzano   YOB: 2000   MEDICAL RECORD NUMBER:  [id number]     DATE OF SURGERY:  3/13/2023   LOCATION: [facility]  ANESTHESIA TYPE:  General    SURGEON:  Jono Webster MD    ASSISTANTS:  Bianka Mahan MD    PREOPERATIVE DIAGNOSES:    Congenital Glaucoma     POSTOPERATIVE DIAGNOSES:    Same as preoperative diagnosis     PROCEDURES:    - Bilateral eye examination under anesthesia, complete both e yes  - External Photography, both eyes  - Fundus Photography with Ret Cam, both eyes  - Corneal suture removal, right eye    IMPLANTS: None     COMPLICATIONS:  None    ESTIMATED BLOOD LOSS:  None      IV FLUIDS:  Per Anesthesia    DISPOSITION:  Ameena was stable for transfer to the postoperative recovery unit upon completion of the procedures.    DETAILS OF THE PROCEDURE:       On the day of surgery, I met the patient, Ameena Solorzano, in the preoperative holding area with her family.  I identified the patient and operative sites and marked them on the preoperative marking sheet.  The indications, risks, benefits, and alternatives for the planned procedure were again discussed with the patient and family.  I answered their questions, and they agreed to proceed.  The patient was then transported to the operating room where she was placed under general anesthesia by the anesthesiologist.  The bed was turned 90 degrees. I participated in a preoperative briefing and time-out and personally identified the patient, surgical plan, and operative site(s).    We proceeded with Ameena's eye examination under anesthesia utilizing the Zeiss operative microscope and indirect ophthalmoscope:  Base Eye Exam     Tonometry (Tonopen, 11:40 AM)       Right Left    Pressure 32 15          Tonometry #2 (Tonopen, 11:41 AM)       Right Left    Pressure 33 13          Tonometry #3 (Tonopen, 11:41 AM)       Right Left    Pressure 32 12           Tonometry #4 (Tonopen, 11:41 AM)       Right Left    Pressure 27 11          Tonometry #5 (Tonopen, 11:41 AM)       Right Left    Pressure 27 9          Tonometry #6 (Tonopen, 11:41 AM)       Right Left    Pressure 26           Tonometry Comments    T1-T3 just after induction of anesthesia, T4-T6 post-intubation at start of EUA           Neuro/Psych     Oriented x3: Yes    Mood/Affect: Normal            Slit Lamp and Fundus Exam     External Exam       Right Left    External protosis R>L, poor lid closure, 4 mm lagophthalmos protosis R>L, poor lid closure, 4 mm lagophthalmos          Slit Lamp Exam       Right Left    Lids/Lashes Exophthalmos R>L Exophthalmos R>L    Conjunctiva/Sclera Two isolated cysts with uveal tissue vs scarring within superior to the limbus at 12:00 and 1:00 White and quiet    Cornea PKP in place 16 10-0 sutures intact. Graft clear and no infiltrates. Superior cornea thin area of cornea between sutures Clear PKP, scattered PEE, no epi defect, inferonasal conjunctivalization of graft    Anterior Chamber Shallow, IK touch 360 moderate shallow    Iris irregular, eccentric pupil, dilated oval pupil, eccentric inferonasal correctopia, irregular, Iris atrophy inferotemporally    Lens No lens structures visualized, clear red reflex Clear    Anterior Vitreous red reflex clear                Indicated studies were performed as reported below.    After the EUA, the eye was prepped and draped in the usual sterile fashion. All 16 sutures were removed from the right eye cornea using a super sharp and jewlers forceps. The suture sites were checked with fluorescein and found to be water tight. The drapes were then removed and one drop of moxifloxacin was placed in the right eye.    The patient was stable at the end of the eye exam and there were no complications. Dr. Pretty was present for the entire procedure. The anesthesiologist reversed anesthesia and Ameena was stable for transfer to the  post-operative recovery unit.    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   Previous glaucoma surgery/laser: spontaneous bleb formation right eye post patch graft  Maximum intraocular pressure: unknown   Currently Meds: xalatan at bedtime each eye, alphagan BID each eye, and cosopt BID each eye   Gonio: unable to perform   Refractive status: Myopia  Steroid exposure: positive, topical FML BID each eye   Meds AEs/intolerance: None   PMHx: No history of Asthma and respiratory problems/Cardiac/Renal/Kidney stones/Sulfa Allergy  Prior testing:  Fundus photo (5/4/21): poor quality due to nystagmus      Today's testing:  IOP 32/13 mmHg   Optic nerve demonstrates increased cupping in the right eye compared to prior photos     Additional Ocular History:     2. Pressley anomaly both eyes  - S/P bilateral penetrating keratoplasty (PK) as an infant (2000), with revision  - S/p EDTA chelation right eye 9/3/2020.  Over the interval, working with Dr. Garcia on scleral lens fittings.  - s/p repeat PK, right eye 8/18/21 - with edema and KNV   - Using PF TID right eye   - Follows w/ Dr. Fam and Dr. Salinas  - Dr. Salinas may consider repeat PKP vs KPro vs nasrin flap vs waiting      3. 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 Congenital nystagmus     5. Dandy Walker Syndrome     6. Exotropia of right eye  - Stable  - Following with Dr. Fam      Plan/Recommendations:    Discussed findings with patient's parents.    Compared to the ONH photos performed by Dr. Fam during EUA on 09/03/2020 there is suspicion for progressive cupping of the right optic nerve. ONH imaging from the time of EUA at the time of Dr. Reyna not visible.     Continue Xalatan at bedtime each eye     Continue Alphagan twice a day each eye     Continue Cosopt twice a day each eye     Start Vigamox 3 times daily right eye post suture removal      Follow up tomorrow for post-op evaluation to make sure none of the suture wounds are leaking     She will need a glaucoma tube in the right eye for improved IOP control (in the next 2 months or so), may consider diamox for the short term, will also need retina evaluation for possible vitrectomy at the same time as tube shunt placement    Continue steroids as per Dr. Lopez, however will inquire as to whether these can be tapered in the short term to improve with IOP control until tube can be placed    RTC: tomorrow 3/14/23 at 8am for post-op evaluation, will see if Dr. Lopez and/or Dr. Calderón are available to evaluate her tomorrow as well.    Bianka Mahan MD  Ophthalmology Resident, PGY-3  [facility]

## 2023-03-13 NOTE — ANESTHESIA POSTPROCEDURE EVALUATION
Patient: Ameena Solorzano    Procedure: Procedure(s):  EXAM UNDER ANESTHESIA, EYE BILATERAL  Remove suture eye post procedure       Anesthesia Type:  General    Note:  Disposition: Outpatient   Postop Pain Control: Uneventful            Sign Out: Well controlled pain   PONV: No   Neuro/Psych: Uneventful            Sign Out: Acceptable/Baseline neuro status   Airway/Respiratory: Uneventful            Sign Out: Acceptable/Baseline resp. status   CV/Hemodynamics: Uneventful            Sign Out: Acceptable CV status; No obvious hypovolemia; No obvious fluid overload   Other NRE:    DID A NON-ROUTINE EVENT OCCUR? No         Summary of Anesthesia management today (3/13/2023)   Preoperative Discussion with patient/patient caregiver    Discussion of plan and proposed anesthesia management were well received    Specific concerns included: highly anxious in medical setting, often responds with nausea/vomiting, if anxiety escalates    History of significant PONV   Preprocedure anxiolysis    CFL was involved in preparation of the patient    Pre-Procedure anxiolysis was required.    Anxiolytic/Sedating meds prior to procedure:  o Midazolam 20 mg, Enteral (PO/NG/OG/G-Tube)    Pre-Procedure interventions  were  adequate    Concerns included: N/A   Intraop/Emergence    Concerns included: uneventful emergence, denies nausea with current regimen   Recommendations for the NEXT anesthesia encounter    Patient has high anxiety and benefits from EARLY administration of oral Midazolam. Today 20 mg worked well, reduced anxiety, allowed placement of PIV    Quadruple PONV ppx (Scopolamine patch, Propofol background infusion, Dexamethasone, Ondansetron) provided adequate PONV ppx, denies nausea         Last vitals:  Vitals Value Taken Time   /48 03/13/23 1230   Temp 37.1  C (98.8  F) 03/13/23 1200   Pulse 93 03/13/23 1235   Resp 20 03/13/23 1235   SpO2 99 % 03/13/23 1235   Vitals shown include unvalidated device data.    Electronically  Signed By: Edward Ann MD  March 13, 2023  12:41 PM

## 2023-03-13 NOTE — DISCHARGE INSTRUCTIONS
Discharge Instructions Following Eye Surgery    Date: 3/13/2023    EYE MEDICATIONS:  You should start drops immediately when arrive home.      Vigamox - 1 drop 3 times a day to operative eye for 5 days  Put only ordered eye drops into the operative eye.  If you have glaucoma, continue your usual drops, unless directed otherwise.    You may notice blurred or double vision initially, this will gradually clear.     If you experience any severe pain, sudden decrease in vision, or discharge for the eye, contact your doctor immediately.     Minor itching, scratching, burning etc. is normal. Use regular or extra-strength Tylenol for discomfort.    Do not rub or push on the operative eye for 1 week.  You may wipe the lids gently with a warm wet cloth to remove matter from eyelashes.    Continue with your usual diet and medications prescribed by your doctor.    Sunglasses will increase your comfort post-operatively.    Post Operative Visit on 3/14/23 at 8am  Bring all material and medications to the office on the first post-op day.  Call your surgeon at [phone] or the answering service for any problems, especially pain.            Scopolamine Patch  (Absorbed through the skin)    The Scopolamine Patch prevents nausea and vomiting caused by motion sickness or anesthesia and surgery in adults.    Brand Name(s): Transderm Scop, Transderm-Scope  There may be other brand names for this medicine.    When This Medicine Should Not Be Used:  You should not use this medicine if you have had an allergic reaction to scopolamine, or if you have narrow angle glaucoma.    How to Use This Medicine:  Your doctor will tell you how many patches to use, where to apply them, and how often to apply them.   Do not use more patches or apply them more often than your doctor tells you to.  This medicine comes with patient instructions. Read and follow these instructions carefully. Ask your doctor or pharmacist if you have any  questions.  To prevent motion sickness, apply the patch at least 4 hours before you need it.  Wash and dry your hands thoroughly before applying the patch.  Leave the patch in its sealed wrapper until you are ready to put it on. Tear the wrapper open carefully. NEVER CUT the wrapper or the patch with scissors. Do not use any patch that has been cut by accident.  Take the liner off the sticky side before applying.  Apply the patch to dry, hairless skin behind the ear.  If the patch is loose or falls off, apply a new patch at a different place behind the ear.  After you take off the patch, wash the place where the patch was and your hands thoroughly.  Only one patch should be used at any time.    If a dose is missed:  If you forget to wear or change a patch, put one on as soon as you can. If it is almost time to put on your next patch, wait until then to apply a new patch and skip the one you missed. Do not apply extra patches to make up for a missed dose.    How to Store and Dispose of This Medicine:  Store the patches at room temperature in a closed container, away from heat, moisture and direct light.  Fold the used patch in half with the sticky sides together. Throw any used patch away so that children or pets cannot get to it. You will also need to throw away old patches after the expiration date has passed.  Keep all medicine away from children and never share your medicine with anyone.    Ask your doctor or pharmacist before using any other medicine, including over-the-counter medicines, vitamins, and herbal products.  Tell your doctor if you are using any medicines that make you sleepy. These include sleeping pills, cold and allergy medicine, narcotic pain relievers and sedatives.   Tell your doctor if you are using any medicine that make you sleepy. These include sleeping pills, roly and allergy medicine, narcotic pain relievers and sedatives.  Do not drink alcohol while you are using this medicine.      Warnings While Using This Medicine:  Make sure your doctor knows if you are pregnant or breastfeeding or if you have glaucoma, prostate problems, trouble urinating, blocked bowels, liver disease, kidney disease or a history of seizures or mental illness.  This medicine can cause blurring of vision and other vision problems if it comes in contact with the eyes. This medicine may also cause problems with urination. If any of these reactions occur, remove the patch and call your doctor right away.  This medicine may make you dizzy or drowsy. Avoid driving, using machines, or doing anything else that could be dangerous if you are not alert. If you plan to participate in underwater sports, this medicine may cause disorienting effects. If this is a concern for you, talk with your doctor.  This medicine may make you sweat less and cause your body to get too hot. Be careful in hot weather, when you are exercising or if using sauna or whirlpool.  Make sure any doctor or dentist who treats you knows that you are using this medicine. This medicine may affect the results of certain medical tests.  Skin burns have been reported at the patch site in several patients wearing an aluminized transdermal system during a magnetic resonance imaging scan (MRI). Because Transderm Scop contains aluminum, it is recommended to remove the system before undergoing an MRI.    Call your doctor right away if you notice any of these side effects:  Allergic reaction: Itching or hives, swelling in your face or hands, swelling or tingling in your mouth or throat, chest tightness, trouble breathing  Blurred vision  Confusion or memory loss  Fast, slow or uneven heartbeat  Lightheadedness, dizziness, drowsiness or fainting  Seeing, hearing or feeling things that are not there  Severe eye pain  Trouble urinating    If you notice these less serious side effects, talk with your doctor:  Dry mouth  Dry, itchy or red eyes  Restlessness  Skin rash or  redness    If you notice other side effects that you think are caused by this medicine, tell your doctor immediately.        Same-Day Surgery   Adult Discharge Orders & Instructions     For 24 hours after surgery:  Get plenty of rest.  A responsible adult must stay with you for at least 24 hours after you leave the hospital.   Pain medication can slow your reflexes. Do not drive or use heavy equipment.  If you have weakness or tingling, don't drive or use heavy equipment until this feeling goes away.  Mixing alcohol and pain medication can cause dizziness and slow your breathing. It can even be fatal. Do not drink alcohol while taking pain medication.  Avoid strenuous or risky activities.  Ask for help when climbing stairs.   You may feel lightheaded.  If so, sit for a few minutes before standing.  Have someone help you get up.   If you have nausea (feel sick to your stomach), drink only clear liquids such as apple juice, ginger ale, broth or 7-Up.  Rest may also help.  Be sure to drink enough fluids.  Move to a regular diet as you feel able. Take pain medications with a small amount of solid food, such as toast or crackers, to avoid nausea.   A slight fever is normal. Call the doctor if your fever is over 100 F (37.7 C) (taken under the tongue) or lasts longer than 24 hours.  You may have a dry mouth, muscle aches, trouble sleeping or a sore throat.  These symptoms should go away after 24 hours.  Do not make important or legal decisions.   Pain Management:      1. Take pain medication (if prescribed) for pain as directed by your physician.        2. WARNING: If the pain medication you have been prescribed contains Tylenol  (acetaminophen), DO NOT take additional doses of Tylenol (acetaminophen).     Call your doctor for any of the followin.  Signs of infection (fever, growing tenderness at the surgery site, severe pain, a large amount of drainage or bleeding, foul-smelling drainage, redness, swelling).    2.   It has been over 8 to 10 hours since surgery and you are still not able to urinate (pee).    3.  Headache for over 24 hours.    4.  Numbness, tingling or weakness the day after surgery (if you had spinal anesthesia).  To contact a doctor, call _____________________________________ or:  '   [phone] and ask for the Resident On Call for:          __________________________________________ (answered 24 hours a day)  '   Emergency Department:  [facility]: [phone]  [facility]: [phone]               Rev. 10/2014      Dr. Webster, [facility], [phone]   Rev. 4/2014

## 2023-03-13 NOTE — ANESTHESIA CARE TRANSFER NOTE
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    Anesthesia Type:   General     Note:    Oropharynx: spontaneously breathing and oropharynx clear of all foreign objects  Level of Consciousness: drowsy  Oxygen Supplementation: face mask  Level of Supplemental Oxygen (L/min / FiO2): 8  Independent Airway: airway patency satisfactory and stable  Dentition: dentition unchanged  Vital Signs Stable: post-procedure vital signs reviewed and stable  Report to RN Given: handoff report given  Patient transferred to: PACU    Handoff Report: Identifed the Patient, Identified the Reponsible Provider, Reviewed the pertinent medical history, Discussed the surgical course, Reviewed Intra-OP anesthesia mangement and issues during anesthesia, Set expectations for post-procedure period and Allowed opportunity for questions and acknowledgement of understanding      Vitals:  Vitals Value Taken Time   BP 95/55 03/13/23 1142   Temp     Pulse 94 03/13/23 1142   Resp 14 03/13/23 1143   SpO2 99 % 03/13/23 1144   Vitals shown include unvalidated device data.    Electronically Signed By: MARLEE Carrera CRNA  March 13, 2023  11:46 AM

## 2023-03-13 NOTE — BRIEF OP NOTE
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  Complications: None.  Implants: * No implants in log *    Bianka Mahan MD  Ophthalmology Resident, PGY-3  [facility]

## 2023-03-13 NOTE — ANESTHESIA PROCEDURE NOTES
Airway       Patient location during procedure: OR  Staff -        Performed By: CRNA  Consent for Airway        Urgency: elective  Indications and Patient Condition       Indications for airway management: devon-procedural       Induction type:intravenous       Mask difficulty assessment: 1 - vent by mask    Final Airway Details       Final airway type: supraglottic airway    Supraglottic Airway Details        Type: LMA       Brand: Air-Q       LMA size: 3.5    Post intubation assessment        Placement verified by: capnometry, equal breath sounds and chest rise        Number of attempts at approach: 1       Number of other approaches attempted: 0       Secured with: silk tape       Ease of procedure: easy       Dentition: Intact

## 2023-03-14 ENCOUNTER — OFFICE VISIT (OUTPATIENT)
Dept: OPHTHALMOLOGY | Facility: CLINIC | Age: 23
End: 2023-03-14
Attending: OPHTHALMOLOGY
Payer: COMMERCIAL

## 2023-03-14 DIAGNOSIS — H18.30 CORNEAL EPITHELIAL DEFECT: ICD-10-CM

## 2023-03-14 DIAGNOSIS — Z94.7 CORNEA REPLACED BY TRANSPLANT: Primary | ICD-10-CM

## 2023-03-14 DIAGNOSIS — Q15.0 CONGENITAL GLAUCOMA: ICD-10-CM

## 2023-03-14 DIAGNOSIS — Q13.4 PETER'S ANOMALY: ICD-10-CM

## 2023-03-14 PROCEDURE — G0463 HOSPITAL OUTPT CLINIC VISIT: HCPCS

## 2023-03-14 PROCEDURE — G0463 HOSPITAL OUTPT CLINIC VISIT: HCPCS | Performed by: OPHTHALMOLOGY

## 2023-03-14 PROCEDURE — 99024 POSTOP FOLLOW-UP VISIT: CPT | Mod: GC | Performed by: OPHTHALMOLOGY

## 2023-03-14 RX ORDER — ACETAZOLAMIDE 250 MG/1
250 TABLET ORAL 2 TIMES DAILY
Qty: 60 TABLET | Refills: 1 | Status: ON HOLD | OUTPATIENT
Start: 2023-03-14 | End: 2023-04-17

## 2023-03-14 ASSESSMENT — REFRACTION_WEARINGRX
OS_CYLINDER: +3.00
OD_AXIS: 120
OD_SPHERE: -12.50
SPECS_TYPE: SVL
OS_AXIS: 120
OD_CYLINDER: +6.00
OS_SPHERE: -6.50

## 2023-03-14 ASSESSMENT — VISUAL ACUITY
METHOD: SNELLEN - LINEAR
OD_CC: 20/600
OS_CC+: -1
OS_CC: 20/70
CORRECTION_TYPE: GLASSES

## 2023-03-14 ASSESSMENT — TONOMETRY
OD_IOP_MMHG: 17
OS_IOP_MMHG: 11
IOP_METHOD: ICARE

## 2023-03-14 ASSESSMENT — SLIT LAMP EXAM - LIDS
COMMENTS: EXOPHTHALMOS R>L
COMMENTS: EXOPHTHALMOS R>L

## 2023-03-14 NOTE — PROGRESS NOTES
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 Reyna for glaucoma management     Chief Complaint/Presenting Concern: Here for glaucoma follow up    History of Present Illness:   Ameena Solorzano is a 20 year old patient who presents for glaucoma follow up. She is s/p repeat PK in the right eye 8/18/21. She is here with dad. Both reports her eyes are stable and no new concerns today. Denies eye pain or vision changes.They are also seeing Dr. Lopez today. She is using alphagan, cosopt BID both eyes, and latanoprost at bedtime both eyes. She is on prednisolone QID right eye and FML BID left eye. Erythromycin ointment Q2H right eye. Used cosopt and alphagan this morning.    3/14/23: POD1 s/p bilateral EUA and right eye corneal sutures removal. She is currently still on alphagan, cosopt, xalatan each eye. She had some pain in the right eye last night with drops.     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   Previous glaucoma surgery/laser: spontaneous bleb formation right eye post patch graft  Maximum intraocular pressure: unknown   Currently Meds: xalatan at bedtime each eye, alphagan BID each eye, and cosopt BID each eye   Gonio: unable to perform   Refractive status: Myopia  Steroid exposure: positive, topical FML BID each eye   Meds AEs/intolerance: None   PMHx: No history of Asthma and respiratory problems/Cardiac/Renal/Kidney stones/Sulfa Allergy  Prior testing:  Fundus photo (5/4/21): poor quality due to nystagmus    Optic nerve demonstrates increased cupping in the right eye compared to prior photos (03/13/23 EUA)     Additional Ocular  History:     2. Pressley anomaly both eyes  - S/P bilateral penetrating keratoplasty (PK) as an infant (2000), with revision  - S/p EDTA chelation right eye 9/3/2020.  Over the interval, working with Dr. Garcia on scleral lens fittings.  - s/p repeat PK, right eye 8/18/21 - with edema and KNV   - Using PF TID right eye    - Follows w/ Dr. Fam and Dr. 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 Congenital nystagmus     5. Dandy Walker Syndrome     6. Exotropia of right eye  - Stable  - Following with Dr. Fam      Plan/Recommendations:    Discussed findings with patient's parents.    Compared to the ONH photos performed by Dr. Fam during EUA on 09/03/2020 there is progressive cupping of the right optic nerve. ONH imaging from the time of EUA at the time of Dr. Reyna not visible. Recommend GENTRY tube shunt. She will need to have the tube placed in the pars plana given that there is extensive PAS and little space to place the tube in the anterior chamber. Wiill refer to retina for combine PPV+GENTRY Tube     Start Diamox 250 mg BID     Continue Xalatan at bedtime each eye     Continue Alphagan twice a day each eye     Continue Cosopt twice a day each eye     Continue Vigamox 3 times daily right eye post suture removal, Will follow with Dr. Lopez in 1 week for epithelial defect post suture removal.     Continue steroids as per Dr. Lopez.     Schedule Ahmed tube surgery in the right eye within the next month combined with vitrectomy.     Bianka Mahan MD  Ophthalmology Resident, PGY-3  [facility]      Physician Attestation     Attending Physician Attestation:  Complete documentation of historical and exam elements from today's encounter can be found in the full encounter summary report (not reduplicated in this progress note). I personally obtained the chief complaint(s) and history of present illness. I confirmed and edited as necessary the review of  systems, past medical/surgical history, family history, social history, and examination findings as documented by others; and I examined the patient myself. I personally reviewed the relevant tests, images, and reports as documented above. I formulated and edited as necessary the assessment and plan and discussed the findings and management plan with the patient and any family members present at the time of the visit.  Jono Webster M.D., Glaucoma, March 14, 2023

## 2023-03-14 NOTE — NURSING NOTE
No chief complaint on file.    Chief Complaint(s) and History of Present Illness(es)    1 day s/p exam under anesthesia BE/Suture removal RE 03/13/2023-Pt. States that she is doing well. Did have some pain last night. No pain today. Did have some  matrer RE this morning.  Jil Coker COT 7:49 AM March 14, 2023

## 2023-03-16 ENCOUNTER — TELEPHONE (OUTPATIENT)
Dept: OPHTHALMOLOGY | Facility: CLINIC | Age: 23
End: 2023-03-16
Payer: COMMERCIAL

## 2023-03-16 NOTE — TELEPHONE ENCOUNTER
Pt's family had told Dr. Webster's assistant that they would prefer an appt on 3/28 (Tues) rather than 3/29 (Weds) with Dr. Calderon.    I called and left a voicemail for pt offering an appt on 3/28 and left the direct call back. Pt's father (co-guardian) called back and stated they would now prefer to keep the 3/29 appt that was set for them. This will be kept as is.    KAUSHAL Bernal 12:59 PM March 16, 2023

## 2023-03-21 DIAGNOSIS — H43.393 VITREOUS SYNERESIS, BILATERAL: Primary | ICD-10-CM

## 2023-03-22 ENCOUNTER — TELEPHONE (OUTPATIENT)
Dept: OPHTHALMOLOGY | Facility: CLINIC | Age: 23
End: 2023-03-22
Payer: COMMERCIAL

## 2023-03-22 DIAGNOSIS — H18.30 CORNEAL EPITHELIAL DEFECT: Primary | ICD-10-CM

## 2023-03-22 RX ORDER — MOXIFLOXACIN 5 MG/ML
1 SOLUTION/ DROPS OPHTHALMIC 3 TIMES DAILY
Qty: 3 ML | Refills: 1 | Status: SHIPPED | OUTPATIENT
Start: 2023-03-22 | End: 2023-05-30

## 2023-03-22 NOTE — TELEPHONE ENCOUNTER
Rx sent for moxifloxacin 3/day in right eye     Pt has f/u appt with Dr. Lopez tomorrow    Left message on home/cell [phone] with direct number for any further assistance and reviewed rx sent    Luke Hightower RN 2:38 PM 03/22/23            [facility]    Phone Message    May a detailed message be left on voicemail: yes     Reason for Call: Medication Question or concern regarding medication   Prescription Clarification  Name of Medication: ?  Prescribing Provider: Dr Webster   Pharmacy: [facility] [address]   What on the order needs clarification? Patients mother Kalpana calling in that patient had surgery done recently and while leaving Dr Webster gave them an antibiotic eye drop that was supposed to last 1 month but patient is completely out. They do not know the name of the medication and want to know if she should continue taking it. If yes, please send a refill to [facility]. Kalpana can be reached at [phone].    Action Taken: Message routed to:  [facility] Eye    Travel Screening: Not Applicable                                                                       Detail Level: Zone Render In Strict Bullet Format?: No Initiate Treatment: Opzelura- Apply twice daily to affected areas for 8 weeks

## 2023-03-23 ENCOUNTER — TELEPHONE (OUTPATIENT)
Dept: OPHTHALMOLOGY | Facility: CLINIC | Age: 23
End: 2023-03-23

## 2023-03-23 ENCOUNTER — OFFICE VISIT (OUTPATIENT)
Dept: OPHTHALMOLOGY | Facility: CLINIC | Age: 23
End: 2023-03-23
Attending: OPHTHALMOLOGY
Payer: COMMERCIAL

## 2023-03-23 DIAGNOSIS — Q15.0 CONGENITAL GLAUCOMA OF BOTH EYES: ICD-10-CM

## 2023-03-23 DIAGNOSIS — H52.213 IRREGULAR ASTIGMATISM OF BOTH EYES: ICD-10-CM

## 2023-03-23 DIAGNOSIS — H21.563 PUPIL IRREGULAR OF BOTH EYES: ICD-10-CM

## 2023-03-23 DIAGNOSIS — Z98.890 POSTSURGICAL STATES FOLLOWING SURGERY OF EYE AND ADNEXA: ICD-10-CM

## 2023-03-23 DIAGNOSIS — H55.01 NYSTAGMUS, CONGENITAL: ICD-10-CM

## 2023-03-23 DIAGNOSIS — Z94.7 CORNEA REPLACED BY TRANSPLANT: Primary | ICD-10-CM

## 2023-03-23 DIAGNOSIS — H04.129 DRY EYE: ICD-10-CM

## 2023-03-23 PROCEDURE — G0463 HOSPITAL OUTPT CLINIC VISIT: HCPCS | Performed by: OPHTHALMOLOGY

## 2023-03-23 PROCEDURE — 99214 OFFICE O/P EST MOD 30 MIN: CPT | Mod: GC | Performed by: OPHTHALMOLOGY

## 2023-03-23 ASSESSMENT — VISUAL ACUITY
OS_CC: 20/70
OD_CC: CF @ 1'
METHOD: SNELLEN - LINEAR
OS_CC+: -2

## 2023-03-23 ASSESSMENT — EXTERNAL EXAM - LEFT EYE: OS_EXAM: PROTOSIS R>L, FAIR LID CLOSURE

## 2023-03-23 ASSESSMENT — TONOMETRY
OS_IOP_MMHG: 9
OD_IOP_MMHG: 18
IOP_METHOD: ICARE

## 2023-03-23 ASSESSMENT — SLIT LAMP EXAM - LIDS
COMMENTS: EXOPHTHALMOS R>L
COMMENTS: EXOPHTHALMOS R>L

## 2023-03-23 NOTE — NURSING NOTE
Chief Complaints and History of Present Illnesses   Patient presents with     Follow Up     Chief Complaint(s) and History of Present Illness(es)     Follow Up            Laterality: both eyes    Associated symptoms: Negative for eye pain, flashes and floaters    Treatments tried: eye drops          Comments    Here for corneal follow up. Vision is about the same. No eye pain. No flashes or floaters. Compliant with drops.    Keven Chamberlain COT 9:39 AM March 23, 2023

## 2023-03-23 NOTE — TELEPHONE ENCOUNTER
Andres called back and left a message confirming the 04/17 date will work for Lyndsay's procedure. Andres said I can call with any questions. I will be reaching out once orders have been placed to schedule post-op appointments and review.

## 2023-03-23 NOTE — TELEPHONE ENCOUNTER
Andres called back to let me know [place] will be [place] on 04[place]03 and that date will not work for surgery. I told Andres I will check with Dr. Webster about other possible dates and get back to him.    I called back and offered 04/17 as a new surgery date. After speaking with Dr. Webster this will be a combined case with Dr. Luc Rao and he is also available 04/17. Andres said 04/17 should work, he is going to speak with Lyndsay Acuna's mom and confirm with me.

## 2023-03-23 NOTE — PROGRESS NOTES
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     Interval Hx 3/23/23:  Underwent EUA with Dr. Webster last week during which corneal sutures were removed. On POD1 patient noted to have epithelial defect, and presents today for monitoring. - defect healed OD     Current Ocular Meds:  - Cosopt BID, OU  - Alphagan BID OU  - Xalatan QHS each eye  - Vigamox TID right eye.   - FML BID left eye only   - Pred forte TID right eye   - Erythromycin ointment QID right eye    - Anthony ointment at nighttime left eye   - Artificial tears PRN each eye   - Oral diamox 250 mg BID (started 3/19/23).      Assessment and Plan:  # s/p EDTA chelation and superficial  Keratectomy right eye 9/3/20 (Dr. Salinas)     # Pressley anomaly both eyes 2000               - s/p repeat PK RE (8/18/21) (Jessica)               - S/P bilateral penetrating keratoplasty (PK) in 2000 age 12 days and 14 days                -  s/p EDTA chelation right eye (See above), with remaining scarring.               - follows with Dr. Fam,  Dr. Webster.                - Previously saw Dr. Garcia prior to PKP, though has not seen again since    8/18/21 PKP right eye.  8/26/21: Post right eye PKP 8/18/21. Epi defect healing 40%. Will continue tx. Disc possible need for bandage CL.  9/3/21. 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 Will continue meds (stop right eye FML). Pt to continue shield at night with glasses during day.  10/13/21: EAU for loose suture removal.  Doing well.    1/6/22: pt doing well overall.  Vision a bit improved but surface quite dry. No Epi defect over graft. Will continue jess for dryness on surface q 2 hours (and include while at school). Disc need for tx of dry ocular surface.  4/12/22: Vision stable Surface dryness, no epi defects.  8/16/22: VA somewhat worsened, though subjective vision stability. Diffuse surface dryness   1/24/23: VA stable, diffuse  surface dryness, stable  3/23/23: No epi defect.      PLAN:  - continue Pred Forte TID right eye   - continue vigamox TID OD  - Continue Emycin Q2H right eye   - Continue FML BID left eye   - Continue Anthony jess nightly left eye  - Continue glaucoma meds each eye  - EUA with Dr. Webster to further assess cornea and possible suture removal - will schedule  - Continue each eye lubrication.  - Continue diamox per Dr. Webster.      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                        - with thin bleb, but chay neg, stable, monitor                        - minimize eye rubbing     # Glaucoma, each eye   - on xalatan, alphagan, and cosopt (timolol and dorzolamide separately now)  - established care with Eleanor.  - IOP good today  - Pt OK for Ahmed surgery    # Lagophthalmos, each eye                        - clear corneas, pt comfortable                         - continue refresh at bedtime both eyes     RTC: has appt in [place] - call sooner with any problems      Bacilio Camacho MD  Ophthalmology, PGY-3      Attending Physician Attestation:  Complete documentation of historical and exam elements from today's encounter can be found in the full encounter summary report (not reduplicated in this progress note).  I personally obtained the chief complaint(s) and history of present illness.  I confirmed and edited as necessary the review of systems, past medical/surgical history, family history, social history, and examination findings as documented by others; and I examined the patient myself.  I personally reviewed the relevant tests, images, and reports as documented above.  I formulated and edited as necessary the assessment and plan and discussed the findings and management plan with the patient and family. - Rebel Lopez MD

## 2023-03-23 NOTE — TELEPHONE ENCOUNTER
I called Andres to schedule EUA for Lyndsay with Dr. Jono Webster, I left a voicemail with callback # [phone]

## 2023-03-24 ENCOUNTER — PREP FOR PROCEDURE (OUTPATIENT)
Dept: OPHTHALMOLOGY | Facility: CLINIC | Age: 23
End: 2023-03-24
Payer: COMMERCIAL

## 2023-03-24 DIAGNOSIS — Q15.0 CONGENITAL GLAUCOMA OF RIGHT EYE: Primary | ICD-10-CM

## 2023-03-24 RX ORDER — PROPARACAINE HYDROCHLORIDE 5 MG/ML
1 SOLUTION/ DROPS OPHTHALMIC ONCE
Status: CANCELLED | OUTPATIENT
Start: 2023-03-24 | End: 2023-03-24

## 2023-03-29 ENCOUNTER — PREP FOR PROCEDURE (OUTPATIENT)
Dept: OPHTHALMOLOGY | Facility: CLINIC | Age: 23
End: 2023-03-29

## 2023-03-29 ENCOUNTER — OFFICE VISIT (OUTPATIENT)
Dept: OPHTHALMOLOGY | Facility: CLINIC | Age: 23
End: 2023-03-29
Attending: OPHTHALMOLOGY
Payer: COMMERCIAL

## 2023-03-29 DIAGNOSIS — Q15.0 CONGENITAL GLAUCOMA OF RIGHT EYE: Primary | ICD-10-CM

## 2023-03-29 DIAGNOSIS — H43.393 VITREOUS SYNERESIS, BILATERAL: ICD-10-CM

## 2023-03-29 DIAGNOSIS — H21.563 PUPIL IRREGULAR OF BOTH EYES: ICD-10-CM

## 2023-03-29 DIAGNOSIS — H55.01 NYSTAGMUS, CONGENITAL: ICD-10-CM

## 2023-03-29 DIAGNOSIS — H04.129 DRY EYE: ICD-10-CM

## 2023-03-29 DIAGNOSIS — Z94.7 CORNEA REPLACED BY TRANSPLANT: ICD-10-CM

## 2023-03-29 PROCEDURE — 92134 CPTRZ OPH DX IMG PST SGM RTA: CPT | Performed by: OPHTHALMOLOGY

## 2023-03-29 PROCEDURE — G0463 HOSPITAL OUTPT CLINIC VISIT: HCPCS | Performed by: OPHTHALMOLOGY

## 2023-03-29 PROCEDURE — 99214 OFFICE O/P EST MOD 30 MIN: CPT | Mod: GC | Performed by: OPHTHALMOLOGY

## 2023-03-29 ASSESSMENT — CONF VISUAL FIELD
OD_SUPERIOR_NASAL_RESTRICTION: 1
OD_INFERIOR_NASAL_RESTRICTION: 3
OS_INFERIOR_TEMPORAL_RESTRICTION: 3
OS_SUPERIOR_NASAL_RESTRICTION: 3
OS_INFERIOR_NASAL_RESTRICTION: 3
OS_SUPERIOR_TEMPORAL_RESTRICTION: 3
OD_INFERIOR_TEMPORAL_RESTRICTION: 3
OD_SUPERIOR_TEMPORAL_RESTRICTION: 1

## 2023-03-29 ASSESSMENT — REFRACTION_WEARINGRX
OD_AXIS: 120
OD_CYLINDER: +6.00
OS_CYLINDER: +3.00
OS_AXIS: 120
SPECS_TYPE: SVL
OD_SPHERE: -12.50
OS_SPHERE: -6.50

## 2023-03-29 ASSESSMENT — VISUAL ACUITY
OD_CC: 3/200 E
OS_CC+: -2
METHOD: SNELLEN - LINEAR
OS_CC: 20/70
CORRECTION_TYPE: GLASSES

## 2023-03-29 ASSESSMENT — TONOMETRY
OD_IOP_MMHG: 09
IOP_METHOD: ICARE
OS_IOP_MMHG: 04

## 2023-03-29 ASSESSMENT — SLIT LAMP EXAM - LIDS
COMMENTS: EXOPHTHALMOS R>L
COMMENTS: EXOPHTHALMOS R>L

## 2023-03-29 ASSESSMENT — EXTERNAL EXAM - LEFT EYE: OS_EXAM: PROPTOSIS R>L, FAIR LID CLOSURE

## 2023-03-29 NOTE — NURSING NOTE
Chief Complaints and History of Present Illnesses   Patient presents with     Retinal Evaluation     Consult for PPV+GENTRY tube     Chief Complaint(s) and History of Present Illness(es)     Retinal Evaluation            Comments: Consult for PPV+GENTRY tube          Comments    Pt states vision is the same as last week. No eye pain today. No new flashes or floaters.  No new redness or dryness. No DM.    HALIMA Edwards March 29, 2023 8:23 AM

## 2023-03-29 NOTE — PROGRESS NOTES
CC -  Evaluation     INTERVAL HISTORY - Initial visit with me 23.    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 Underdeveloped speech and coordination. Patient underwent EUA 3/13/2023 which shoed progressive cupping of right optic nerve. Dr. Webster recommends GENTRY OD in pars plana given extensive PAS in AC.     Current Ocular Meds:  - Cosopt BID, OU  - Alphagan BID OU  - Xalatan QHS each eye  - Vigamox TID right eye.   - FML BID left eye only   - Pred forte TID right eye   - Erythromycin ointment QID right eye    - Anthony ointment at nighttime left eye   - Artificial tears PRN each eye   - Oral diamox 250 mg BID (started 3/19/23).     PAST OCULAR SURGERY  EUA 3/13/2023 (Dr. Webster)  Repeat PK OD 2021 (Dr. Lopez)  PK OU  (Dr. John Lira - as )  EDTA chelation OD    RETINAL IMAGING:  OCT 23   OD - unable  OS - hazy, retina appears attached on single cuts    ASSESSMENT & PLAN    # Congenital glaucoma 2/2 Pressley Anomaly OU   - concern for glaucoma progression OD on EUA with plan for GENTRY OD in pars plana combo PPV   - is scheduled for combined right eye PPV/GENTRY on 2023   - 3/13/2023 no good view to retina due to nystagmus but retina is attached and I agree with plan to place glaucoma tube in vit cavity that needs PPV   - After explaining all risks, benefits and alternatives of the surgery including but not limited to endophthalmitis (rare but a devastating complication that will need antibiotics injection and more surgeries), retinal detachment (will need additional surgeries), progression of cataract or new cataract formation, high or low intraocular pressure that may need  medications surgeries, need for face down positioning if gas or oil bubble placed in the eye were discussed with the patient. We discussed about the benefits of PPV surgery and its alternatives. All the questions answered. Lyndsay and parents (Andres and Kalpana) agreed and wanted to proceed.   - scheduled for 4/17/23: will dilate the left eye for examination of the left retina        # PKP OU   - s/p repeat OD 8/2021 with Dr. Lopez   - Follows with Dr. Lopez - last seen 3/23/2023 follow up 5/2023    # Spontaneous bleb, OD   - treated with patch graft 2007    - Follows with Dr. 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 Fam    return to clinic: POD1    Shaina Clements MD  Resident Physician - PGY3  Department of Ophthalmology   [facility]      Complete documentation of historical and exam elements from today's encounter can be found in the full encounter summary report (not reduplicated in this progress note). I personally obtained the chief complaint(s) and history of present illness.  I confirmed and edited as necessary the review of systems, past medical/surgical history, family history, social history, and examination findings as documented by others; and I examined the patient myself. I personally reviewed the relevant tests, images, and reports as documented above. I formulated and edited as necessary the assessment and plan and discussed the findings and management plan with the patient and family.     Luc Calderon MD, PhD

## 2023-04-13 ENCOUNTER — TELEPHONE (OUTPATIENT)
Dept: OPHTHALMOLOGY | Facility: CLINIC | Age: 23
End: 2023-04-13
Payer: COMMERCIAL

## 2023-04-13 NOTE — TELEPHONE ENCOUNTER
Returned Andres's call to confirm Lyndsay is on the surgery schedule for Monday still. Andres said they never received the surgery packet sent out. I let Andres know he should receive a phone call 1-3 business days before surgery with the arrival time/surgery time and instructions.

## 2023-04-15 ENCOUNTER — HEALTH MAINTENANCE LETTER (OUTPATIENT)
Age: 23
End: 2023-04-15

## 2023-04-16 ENCOUNTER — ANESTHESIA EVENT (OUTPATIENT)
Dept: SURGERY | Facility: CLINIC | Age: 23
End: 2023-04-16
Payer: COMMERCIAL

## 2023-04-17 ENCOUNTER — HOSPITAL ENCOUNTER (OUTPATIENT)
Facility: CLINIC | Age: 23
Discharge: HOME | End: 2023-04-17
Attending: OPHTHALMOLOGY | Admitting: OPHTHALMOLOGY
Payer: COMMERCIAL

## 2023-04-17 ENCOUNTER — ANESTHESIA (OUTPATIENT)
Dept: SURGERY | Facility: CLINIC | Age: 23
End: 2023-04-17
Payer: COMMERCIAL

## 2023-04-17 VITALS
RESPIRATION RATE: 22 BRPM | HEART RATE: 90 BPM | WEIGHT: 112.21 LBS | TEMPERATURE: 98.5 F | HEIGHT: 60 IN | BODY MASS INDEX: 22.03 KG/M2 | DIASTOLIC BLOOD PRESSURE: 69 MMHG | OXYGEN SATURATION: 95 % | SYSTOLIC BLOOD PRESSURE: 102 MMHG

## 2023-04-17 DIAGNOSIS — Z98.890 POSTOPERATIVE EYE STATE: Primary | ICD-10-CM

## 2023-04-17 DIAGNOSIS — Q15.0 CONGENITAL GLAUCOMA: ICD-10-CM

## 2023-04-17 PROCEDURE — C1762 CONN TISS, HUMAN(INC FASCIA): HCPCS | Performed by: OPHTHALMOLOGY

## 2023-04-17 PROCEDURE — 710N000012 HC RECOVERY PHASE 2, PER MINUTE: Performed by: OPHTHALMOLOGY

## 2023-04-17 PROCEDURE — 710N000010 HC RECOVERY PHASE 1, LEVEL 2, PER MIN: Performed by: OPHTHALMOLOGY

## 2023-04-17 PROCEDURE — 250N000013 HC RX MED GY IP 250 OP 250 PS 637: Performed by: ANESTHESIOLOGY

## 2023-04-17 PROCEDURE — 370N000017 HC ANESTHESIA TECHNICAL FEE, PER MIN: Performed by: OPHTHALMOLOGY

## 2023-04-17 PROCEDURE — 66180 AQUEOUS SHUNT EYE W/GRAFT: CPT | Mod: RT | Performed by: OPHTHALMOLOGY

## 2023-04-17 PROCEDURE — 67036 REMOVAL OF INNER EYE FLUID: CPT | Mod: LT | Performed by: OPHTHALMOLOGY

## 2023-04-17 PROCEDURE — 999N000141 HC STATISTIC PRE-PROCEDURE NURSING ASSESSMENT: Performed by: OPHTHALMOLOGY

## 2023-04-17 PROCEDURE — 250N000009 HC RX 250: Performed by: OPHTHALMOLOGY

## 2023-04-17 PROCEDURE — 250N000011 HC RX IP 250 OP 636: Performed by: OPHTHALMOLOGY

## 2023-04-17 PROCEDURE — 250N000011 HC RX IP 250 OP 636

## 2023-04-17 PROCEDURE — C1783 OCULAR IMP, AQUEOUS DRAIN DE: HCPCS | Performed by: OPHTHALMOLOGY

## 2023-04-17 PROCEDURE — 272N000001 HC OR GENERAL SUPPLY STERILE: Performed by: OPHTHALMOLOGY

## 2023-04-17 PROCEDURE — 258N000003 HC RX IP 258 OP 636

## 2023-04-17 PROCEDURE — 250N000009 HC RX 250

## 2023-04-17 PROCEDURE — 360N000077 HC SURGERY LEVEL 4, PER MIN: Performed by: OPHTHALMOLOGY

## 2023-04-17 PROCEDURE — 250N000009 HC RX 250: Performed by: ANESTHESIOLOGY

## 2023-04-17 DEVICE — EYE IMP OPTIGRAFT CORNEA 1/2 HALO HCO-HH1: Type: IMPLANTABLE DEVICE | Site: EYE | Status: FUNCTIONAL

## 2023-04-17 DEVICE — EYE IMP VALVE AHMED FLEXIBLE FP7: Type: IMPLANTABLE DEVICE | Site: EYE | Status: FUNCTIONAL

## 2023-04-17 RX ORDER — FENTANYL CITRATE 50 UG/ML
25 INJECTION, SOLUTION INTRAMUSCULAR; INTRAVENOUS
Status: DISCONTINUED | OUTPATIENT
Start: 2023-04-17 | End: 2023-04-17 | Stop reason: HOSPADM

## 2023-04-17 RX ORDER — ATROPINE SULFATE 10 MG/ML
1 SOLUTION/ DROPS OPHTHALMIC 2 TIMES DAILY
Qty: 5 ML | Refills: 1 | Status: SHIPPED | OUTPATIENT
Start: 2023-04-18 | End: 2023-05-30

## 2023-04-17 RX ORDER — ATROPINE SULFATE 10 MG/ML
SOLUTION/ DROPS OPHTHALMIC PRN
Status: DISCONTINUED | OUTPATIENT
Start: 2023-04-17 | End: 2023-04-17 | Stop reason: HOSPADM

## 2023-04-17 RX ORDER — ONDANSETRON 4 MG/1
4 TABLET, ORALLY DISINTEGRATING ORAL EVERY 30 MIN PRN
Status: DISCONTINUED | OUTPATIENT
Start: 2023-04-17 | End: 2023-04-17 | Stop reason: HOSPADM

## 2023-04-17 RX ORDER — ONDANSETRON 2 MG/ML
4 INJECTION INTRAMUSCULAR; INTRAVENOUS EVERY 30 MIN PRN
Status: DISCONTINUED | OUTPATIENT
Start: 2023-04-17 | End: 2023-04-17 | Stop reason: HOSPADM

## 2023-04-17 RX ORDER — LIDOCAINE HYDROCHLORIDE 20 MG/ML
INJECTION, SOLUTION INFILTRATION; PERINEURAL PRN
Status: DISCONTINUED | OUTPATIENT
Start: 2023-04-17 | End: 2023-04-17

## 2023-04-17 RX ORDER — ONDANSETRON 2 MG/ML
INJECTION INTRAMUSCULAR; INTRAVENOUS PRN
Status: DISCONTINUED | OUTPATIENT
Start: 2023-04-17 | End: 2023-04-17

## 2023-04-17 RX ORDER — FENTANYL CITRATE 50 UG/ML
50 INJECTION, SOLUTION INTRAMUSCULAR; INTRAVENOUS EVERY 5 MIN PRN
Status: DISCONTINUED | OUTPATIENT
Start: 2023-04-17 | End: 2023-04-17 | Stop reason: HOSPADM

## 2023-04-17 RX ORDER — BALANCED SALT SOLUTION 6.4; .75; .48; .3; 3.9; 1.7 MG/ML; MG/ML; MG/ML; MG/ML; MG/ML; MG/ML
SOLUTION OPHTHALMIC PRN
Status: DISCONTINUED | OUTPATIENT
Start: 2023-04-17 | End: 2023-04-17 | Stop reason: HOSPADM

## 2023-04-17 RX ORDER — MIDAZOLAM HYDROCHLORIDE 2 MG/ML
20 SYRUP ORAL ONCE
Status: COMPLETED | OUTPATIENT
Start: 2023-04-17 | End: 2023-04-17

## 2023-04-17 RX ORDER — FENTANYL CITRATE 50 UG/ML
25 INJECTION, SOLUTION INTRAMUSCULAR; INTRAVENOUS EVERY 5 MIN PRN
Status: DISCONTINUED | OUTPATIENT
Start: 2023-04-17 | End: 2023-04-17 | Stop reason: HOSPADM

## 2023-04-17 RX ORDER — MOXIFLOXACIN 5 MG/ML
1 SOLUTION/ DROPS OPHTHALMIC 4 TIMES DAILY
Qty: 3 ML | Refills: 3 | Status: SHIPPED | OUTPATIENT
Start: 2023-04-18 | End: 2023-05-30

## 2023-04-17 RX ORDER — GLYCOPYRROLATE 0.2 MG/ML
INJECTION, SOLUTION INTRAMUSCULAR; INTRAVENOUS PRN
Status: DISCONTINUED | OUTPATIENT
Start: 2023-04-17 | End: 2023-04-17

## 2023-04-17 RX ORDER — OXYCODONE HYDROCHLORIDE 10 MG/1
10 TABLET ORAL
Status: DISCONTINUED | OUTPATIENT
Start: 2023-04-17 | End: 2023-04-17 | Stop reason: HOSPADM

## 2023-04-17 RX ORDER — PROPOFOL 10 MG/ML
INJECTION, EMULSION INTRAVENOUS PRN
Status: DISCONTINUED | OUTPATIENT
Start: 2023-04-17 | End: 2023-04-17

## 2023-04-17 RX ORDER — TRIAMCINOLONE ACETONIDE 40 MG/ML
INJECTION, SUSPENSION INTRA-ARTICULAR; INTRAMUSCULAR PRN
Status: DISCONTINUED | OUTPATIENT
Start: 2023-04-17 | End: 2023-04-17 | Stop reason: HOSPADM

## 2023-04-17 RX ORDER — PROPOFOL 10 MG/ML
INJECTION, EMULSION INTRAVENOUS CONTINUOUS PRN
Status: DISCONTINUED | OUTPATIENT
Start: 2023-04-17 | End: 2023-04-17

## 2023-04-17 RX ORDER — PROPARACAINE HYDROCHLORIDE 5 MG/ML
1 SOLUTION/ DROPS OPHTHALMIC ONCE
Status: COMPLETED | OUTPATIENT
Start: 2023-04-17 | End: 2023-04-17

## 2023-04-17 RX ORDER — DEXAMETHASONE SODIUM PHOSPHATE 4 MG/ML
INJECTION, SOLUTION INTRA-ARTICULAR; INTRALESIONAL; INTRAMUSCULAR; INTRAVENOUS; SOFT TISSUE PRN
Status: DISCONTINUED | OUTPATIENT
Start: 2023-04-17 | End: 2023-04-17

## 2023-04-17 RX ORDER — SODIUM CHLORIDE, SODIUM LACTATE, POTASSIUM CHLORIDE, CALCIUM CHLORIDE 600; 310; 30; 20 MG/100ML; MG/100ML; MG/100ML; MG/100ML
INJECTION, SOLUTION INTRAVENOUS CONTINUOUS
Status: DISCONTINUED | OUTPATIENT
Start: 2023-04-17 | End: 2023-04-17 | Stop reason: HOSPADM

## 2023-04-17 RX ORDER — PREDNISOLONE ACETATE 10 MG/ML
1 SUSPENSION/ DROPS OPHTHALMIC 4 TIMES DAILY
Qty: 5 ML | Refills: 3 | Status: SHIPPED | OUTPATIENT
Start: 2023-04-18 | End: 2023-09-05

## 2023-04-17 RX ORDER — OXYCODONE HYDROCHLORIDE 5 MG/1
5 TABLET ORAL
Status: DISCONTINUED | OUTPATIENT
Start: 2023-04-17 | End: 2023-04-17 | Stop reason: HOSPADM

## 2023-04-17 RX ORDER — FENTANYL CITRATE 50 UG/ML
INJECTION, SOLUTION INTRAMUSCULAR; INTRAVENOUS PRN
Status: DISCONTINUED | OUTPATIENT
Start: 2023-04-17 | End: 2023-04-17

## 2023-04-17 RX ORDER — NEOMYCIN SULFATE, POLYMYXIN B SULFATE, AND DEXAMETHASONE 3.5; 10000; 1 MG/G; [USP'U]/G; MG/G
OINTMENT OPHTHALMIC PRN
Status: DISCONTINUED | OUTPATIENT
Start: 2023-04-17 | End: 2023-04-17 | Stop reason: HOSPADM

## 2023-04-17 RX ORDER — SODIUM CHLORIDE, SODIUM LACTATE, POTASSIUM CHLORIDE, CALCIUM CHLORIDE 600; 310; 30; 20 MG/100ML; MG/100ML; MG/100ML; MG/100ML
INJECTION, SOLUTION INTRAVENOUS CONTINUOUS PRN
Status: DISCONTINUED | OUTPATIENT
Start: 2023-04-17 | End: 2023-04-17

## 2023-04-17 RX ORDER — SCOLOPAMINE TRANSDERMAL SYSTEM 1 MG/1
1 PATCH, EXTENDED RELEASE TRANSDERMAL
Status: DISCONTINUED | OUTPATIENT
Start: 2023-04-17 | End: 2023-04-17 | Stop reason: HOSPADM

## 2023-04-17 RX ORDER — CYCLOPENTOLAT/TROPIC/PHENYLEPH 1%-1%-2.5%
1 DROPS (EA) OPHTHALMIC (EYE) SEE ADMIN INSTRUCTIONS
Status: DISCONTINUED | OUTPATIENT
Start: 2023-04-17 | End: 2023-04-17 | Stop reason: HOSPADM

## 2023-04-17 RX ADMIN — GLYCOPYRROLATE 0.1 MG: 0.2 INJECTION, SOLUTION INTRAMUSCULAR; INTRAVENOUS at 17:01

## 2023-04-17 RX ADMIN — MIDAZOLAM 2 MG: 1 INJECTION INTRAMUSCULAR; INTRAVENOUS at 15:59

## 2023-04-17 RX ADMIN — Medication 1 DROP: at 13:35

## 2023-04-17 RX ADMIN — DEXMEDETOMIDINE HYDROCHLORIDE 4 MCG: 100 INJECTION, SOLUTION INTRAVENOUS at 18:23

## 2023-04-17 RX ADMIN — PROPOFOL 20 MG: 10 INJECTION, EMULSION INTRAVENOUS at 18:33

## 2023-04-17 RX ADMIN — PROPARACAINE HYDROCHLORIDE 1 DROP: 5 SOLUTION/ DROPS OPHTHALMIC at 13:29

## 2023-04-17 RX ADMIN — SODIUM CHLORIDE, POTASSIUM CHLORIDE, SODIUM LACTATE AND CALCIUM CHLORIDE: 600; 310; 30; 20 INJECTION, SOLUTION INTRAVENOUS at 16:07

## 2023-04-17 RX ADMIN — FENTANYL CITRATE 25 MCG: 50 INJECTION, SOLUTION INTRAMUSCULAR; INTRAVENOUS at 16:39

## 2023-04-17 RX ADMIN — Medication 1 DROP: at 13:31

## 2023-04-17 RX ADMIN — DEXMEDETOMIDINE HYDROCHLORIDE 12 MCG: 100 INJECTION, SOLUTION INTRAVENOUS at 16:36

## 2023-04-17 RX ADMIN — Medication 1 DROP: at 13:41

## 2023-04-17 RX ADMIN — FENTANYL CITRATE 25 MCG: 50 INJECTION, SOLUTION INTRAMUSCULAR; INTRAVENOUS at 16:12

## 2023-04-17 RX ADMIN — PROPOFOL 200 MCG/KG/MIN: 10 INJECTION, EMULSION INTRAVENOUS at 16:12

## 2023-04-17 RX ADMIN — PROPOFOL 150 MG: 10 INJECTION, EMULSION INTRAVENOUS at 16:12

## 2023-04-17 RX ADMIN — ONDANSETRON 4 MG: 2 INJECTION INTRAMUSCULAR; INTRAVENOUS at 18:12

## 2023-04-17 RX ADMIN — LIDOCAINE HYDROCHLORIDE 40 MG: 20 INJECTION, SOLUTION INFILTRATION; PERINEURAL at 16:11

## 2023-04-17 RX ADMIN — PROPOFOL 50 MG: 10 INJECTION, EMULSION INTRAVENOUS at 16:22

## 2023-04-17 RX ADMIN — DEXAMETHASONE SODIUM PHOSPHATE 4 MG: 4 INJECTION, SOLUTION INTRA-ARTICULAR; INTRALESIONAL; INTRAMUSCULAR; INTRAVENOUS; SOFT TISSUE at 16:29

## 2023-04-17 RX ADMIN — SCOPALAMINE 1 PATCH: 1 PATCH, EXTENDED RELEASE TRANSDERMAL at 15:50

## 2023-04-17 RX ADMIN — PROPOFOL 20 MG: 10 INJECTION, EMULSION INTRAVENOUS at 16:35

## 2023-04-17 RX ADMIN — PHENYLEPHRINE HYDROCHLORIDE 50 MCG: 10 INJECTION INTRAVENOUS at 17:16

## 2023-04-17 RX ADMIN — MIDAZOLAM HYDROCHLORIDE 20 MG: 2 SYRUP ORAL at 14:58

## 2023-04-17 ASSESSMENT — ACTIVITIES OF DAILY LIVING (ADL)
ADLS_ACUITY_SCORE: 35

## 2023-04-17 NOTE — OR NURSING
Pharmacy called regarding discharge medications. Moxifloxacin and Prednisolone are too soon to fill- per parents, they have these medications at home. Pharmacy updated.

## 2023-04-17 NOTE — BRIEF OP NOTE
[facility]    Brief Operative Note    Pre-operative diagnosis: Congenital glaucoma of right eye [Q15.0]  Post-operative diagnosis Same as pre-operative diagnosis    Procedure: Procedure(s):  INSERTION, IMPLANT, RIGHT EYE VALVE  Right Vitrectomy Parsplana with 25 Gauge System - Right; endolaser; peeling of vitreous membrane  Exam Under Anesthesia Eye(s) Left  Surgeon: Surgeon(s) and Role:  Panel 1:     * Jono Webster MD - Primary     * Robinson Stone MD - Resident - Assisting  Panel 2:     * Luc Dc MD - Primary     * Bacilio Camacho MD - Resident - Assisting     * Rigo Loco MD - Fellow - Assisting  Anesthesia: General   Estimated Blood Loss: Minimal    Drains: None  Specimens: * No specimens in log *  Findings:   as expected.  Complications: None.  Implants:   Implant Name Type Inv. Item Serial No.  Lot No. LRB No. Used Action   EYE IMP VALVE AHMED FLEXIBLE FP7 - [id number] Lens/Eye Implant EYE IMP VALVE AHMED FLEXIBLE F [employer]  Right 1 Implanted   OPTIGRAFT LIONS EYE INSITUTE SPLIT THICKNESS 1/2 MOON CORNEA / ALBUMIN   [id number]   Right 1 Implanted

## 2023-04-17 NOTE — ANESTHESIA PREPROCEDURE EVALUATION
Anesthesia Pre-Procedure Evaluation    Patient: Ameena Solorzano   MRN: [id number] : 2000        Procedure : Procedure(s):  INSERTION, IMPLANT, RIGHT EYE VALVE  Right Vitrectomy Parsplana with 25 Gauge System - Right  Exam Under Anesthesia Eye(s) Left          Past Medical History:   Diagnosis Date     6p partial monosomy syndrome      Dandy-Walker syndrome (H)      Dandy-Walker syndrome (H)      Glaucoma      Immune deficiency disorder (H)      Nystagmus      Peter's anomaly      PONV (postoperative nausea and vomiting)      Ptosis      Strabismus       Past Surgical History:   Procedure Laterality Date     EXAM UNDER ANESTHESIA EYE(S) Bilateral 9/3/2020    Procedure: EXAM UNDER ANESTHESIA, BILATERAL EYE, WITH RETCAM PHOTOS AND A/B ULTRASOUND;  Surgeon: Romina Fam MD;  [facility]     EXAM UNDER ANESTHESIA EYE(S) Right 2021    Procedure: Exam under anesthesia eye(s);  Surgeon: Rebel Lopez MD;  Location: [facility] OR     EXAM UNDER ANESTHESIA EYE(S) Bilateral 10/13/2021    Procedure: Bilateral Eye Exam Under Anesthesia;  Surgeon: Rebel Lopez MD;  [facility]     EXAM UNDER ANESTHESIA EYE(S) Bilateral 3/13/2023    Procedure: EXAM UNDER ANESTHESIA, EYE BILATERAL;  Surgeon: Jono Webster MD;  [facility] OR     g tube removed  2002     GASTROSTOMY TUBE  2001     HERNIA REPAIR  3/2009     HERNIA REPAIR  2009     KERATOPLASTY PENETRATING      BE     KERATOPLASTY PENETRATING Right 2021    Procedure: KERATOPLASTY, PENETRATING right eye;  Surgeon: Rebel Lopez MD;  Location: [facility] OR     PE TUBES      x 5     REMOVE SUTURE EYE POST PROCEDURE Right 10/13/2021    Procedure: Suture Removal Right Eye;  Surgeon: Rebel Lopez MD;  [facility]     REMOVE SUTURE EYE POST PROCEDURE Right 3/13/2023    Procedure: RIGHT EYE SUTURE REMOVAL;  Surgeon: Jono Webster MD;  [facility]     REPAIR VENTRICULAR SEPTAL DEFECT  2001     REVISE  SHUNT VENTRICULARPERITONEAL CHILD  1/2001     SCRUB ETHYLENEDIAMENETETRAACETIC (EDTA) Right 9/3/2020    Procedure: 1. ETHYLENEDIAMINETETRAACETIC ACID Chelation,  2. Superficial keratectomy,  3. Placement of bandage contact lens (Kontour 20 mm),;  Surgeon: Carter Salinas MD;  [facility]: [facility]     TYMPANOPLASTY, RT/LT Left 12/2007     TYMPANOPLASTY, RT/LT Right 6/2008     ZZC ANESTH,CORNEAL TRANSPLANT        Allergies   Allergen Reactions     Latex      Seasonal Allergies       Social History     Tobacco Use     Smoking status: Never     Smokeless tobacco: Never   Vaping Use     Vaping status: Not on file   Substance Use Topics     Alcohol use: Never      Wt Readings from Last 1 Encounters:   04/17/23 50.9 kg (112 lb 3.4 oz)        Anesthesia Evaluation   Pt has had prior anesthetic. Type: General.    History of anesthetic complications (high anxiety in medical setting)  - PONV.      ROS/MED HX  ENT/Pulmonary:     (+) asthma (well controlled, on Montelukast)  (-) sleep apnea (in infancy only)   Neurologic: Comment:   - congenital glaucoma, Peter's anomaly  - Dandy walker syndrome  - requires hearing aides    (+) Developmental delay,     Cardiovascular: Comment:   - S/p VSD repair at 4 weeks of age, known murmur  - Mild Aortic insufficiency and AORTIC STENOSIS, normal function    (+) -----valvular problems/murmurs type: AS and AI both mild.     METS/Exercise Tolerance: 3 - Able to walk 1-2 blocks without stopping    Hematologic:  - neg hematologic  ROS     Musculoskeletal:  - neg musculoskeletal ROS     GI/Hepatic:  - neg GI/hepatic ROS     Renal/Genitourinary:  - neg Renal ROS     Endo:  - neg endo ROS     Psychiatric/Substance Use:     (+) psychiatric history anxiety     Infectious Disease:  - neg infectious disease ROS     Malignancy:  - neg malignancy ROS     Other:      (+) , other significant disability Blind (Dandy Walker syndrome),          Physical Exam    Airway   unable to assess          Respiratory  Devices and Support         Dental           Cardiovascular             Pulmonary                   OUTSIDE LABS:  CBC:   Lab Results   Component Value Date    WBC 5.9 01/11/2006    HGB 10.9 01/11/2006    HCT 32.9 01/11/2006     01/11/2006     BMP: No results found for: NA, POTASSIUM, CHLORIDE, CO2, BUN, CR, GLC  COAGS: No results found for: PTT, INR, FIBR  POC:   Lab Results   Component Value Date    HCG Negative 08/18/2021     HEPATIC: No results found for: ALBUMIN, PROTTOTAL, ALT, AST, GGT, ALKPHOS, BILITOTAL, BILIDIRECT, GILBERTO  OTHER: No results found for: PH, LACT, A1C, LEONARD, PHOS, MAG, LIPASE, AMYLASE, TSH, T4, T3, CRP, SED    Anesthesia Plan    ASA Status:  3   NPO Status:  NPO Appropriate    Anesthesia Type: General.     - Airway: LMA   Induction: Intravenous.   Maintenance: Balanced.        Consents    Anesthesia Plan(s) and associated risks, benefits, and realistic alternatives discussed. Questions answered and patient/representative(s) expressed understanding.    - Discussed:     - Discussed with:  Patient         Postoperative Care    Pain management: IV analgesics, Oral pain medications, Multi-modal analgesia.   PONV prophylaxis: Ondansetron (or other 5HT-3), Dexamethasone or Solumedrol     Comments:                Riki Galindo MD

## 2023-04-17 NOTE — DISCHARGE INSTRUCTIONS
Discharge Instructions Following Glaucoma Surgery    Date: 4/17/2023    DO NOT TAKE YOUR DIAMOX TODAY    DO NOT use eye drops in the RIGHT eye that was operated on today and keep it covered. We will remove the patch tomorrow.     Continue eye drops in the LEFT non-operated eye    For 5 days: Wear your glasses or leave the eye uncovered while awake.    Wear the eye shield every night and during daytime naps.  DO NOT use padding under eye shield.    You may notice blurred or double vision initially, this will gradually clear.     If you experience any severe pain, sudden decrease in vision, or discharge for the eye, contact your doctor immediately.     Minor itching, scratching, burning etc. is normal. Use regular or extra-strength Tylenol for discomfort.    Refrain from heavy lifting, excessive bending over, and heavy exertion for 1 week.    You may bathe or shower but do not get the eye wet.    Do not rub or push on the operative eye for 1 week.  You may wipe the lids gently with a warm wet cloth to remove matter from eyelashes.    Continue with your usual diet and medications prescribed by your doctor.    Sunglasses will increase your comfort post-operatively.    Post Operative Visit on 4/18/23 in the [facility] ([address] [place] [address]; [place], [place])  Bring all material and medications to the office on the first post-op day.  Call your surgeon at [phone] or the answering service for any problems, especially pain.        Same-Day Surgery   Adult Discharge Orders & Instructions     For 24 hours after surgery:  Get plenty of rest.  A responsible adult must stay with you for at least 24 hours after you leave the hospital.   Pain medication can slow your reflexes. Do not drive or use heavy equipment.  If you have weakness or tingling, don't drive or use heavy equipment until this feeling goes away.  Mixing alcohol and pain medication can cause dizziness and slow your breathing. It can even be  fatal. Do not drink alcohol while taking pain medication.  Avoid strenuous or risky activities.  Ask for help when climbing stairs.   You may feel lightheaded.  If so, sit for a few minutes before standing.  Have someone help you get up.   If you have nausea (feel sick to your stomach), drink only clear liquids such as apple juice, ginger ale, broth or 7-Up.  Rest may also help.  Be sure to drink enough fluids.  Move to a regular diet as you feel able. Take pain medications with a small amount of solid food, such as toast or crackers, to avoid nausea.   A slight fever is normal. Call the doctor if your fever is over 100 F (37.7 C) (taken under the tongue) or lasts longer than 24 hours.  You may have a dry mouth, muscle aches, trouble sleeping or a sore throat.  These symptoms should go away after 24 hours.  Do not make important or legal decisions.   Pain Management:      1. Take pain medication (if prescribed) for pain as directed by your physician.        2. WARNING: If the pain medication you have been prescribed contains Tylenol  (acetaminophen), DO NOT take additional doses of Tylenol (acetaminophen).     Call your doctor for any of the followin.  Signs of infection (fever, growing tenderness at the surgery site, severe pain, a large amount of drainage or bleeding, foul-smelling drainage, redness, swelling).    2.  It has been over 8 to 10 hours since surgery and you are still not able to urinate (pee).    3.  Headache for over 24 hours.    4.  Numbness, tingling or weakness the day after surgery (if you had spinal anesthesia).  To contact a doctor, call Dr. Webster, [facility], [phone]  or:  '   [phone] and ask for the Resident On Call for:         Opthalmology (answered 24 hours a day)  '   Emergency Department:  [place] [facility]: [phone]  [place] [facility]: [phone]

## 2023-04-17 NOTE — ANESTHESIA PROCEDURE NOTES
Airway       Patient location during procedure: OR  Staff -        Anesthesiologist:  Riki Galindo MD       CRNA: Ernestina Huber APRN CRNA       Performed By: CRNA and anesthesiologistIndications and Patient Condition       Indications for airway management: devon-procedural       Induction type:intravenous       Mask difficulty assessment: 1 - vent by mask    Final Airway Details       Final airway type: supraglottic airway    Supraglottic Airway Details        Type: LMA       Brand: Air-Q       LMA size: 3.5    Post intubation assessment        Placement verified by: capnometry, equal breath sounds and chest rise        Number of attempts at approach: 1       Number of other approaches attempted: 0       Secured with: pink tape       Ease of procedure: easy       Dentition: Intact and Unchanged

## 2023-04-17 NOTE — OP NOTE
PRE-OP Dx:    1)Congenital glaucoma, right eye    Post-OP Dx: same    Attending:   Luc Calderon MD PhD    Jono Webster MD (combination case)    Fellow: Rigo Loco MD    Resident:   Bacilio Camacho MD    Anesthesia: General + RB    Procedure:    1) Pars plana vitrectomy (PPV) 25g   2) Endolaser    EBL:  scant  Specimens: none  Complications: none    Findings:    1) Inferior chorioretinal atrophy, right eye      Procedure Description:  Ameena Solorzano is a 22 year old patient with a history of congenital glaucoma in the right eye with plans for placement of a pars plana tube. After informed consent was obtained, she was brought into the operating room where general anesthesia was administered.  The eye was then prepped and draped in the usual fashion for ophthalmic surgery.    Dr. Webster and her team began the case and placed the plate without placement of the tube.     Attention was then turned to the vitrectomy.  Marks were made on the sclera inferotemporally, superotemporally, and superonasally 3.5 mm posterior to the limbus.  The 25g transscleral cannulas were inserted through the sclera using the trocars.  The infusion cannula was connected to the inferonasal cannula and directly visualized to verify it was in the correct location.  A core vitrectomy was performed and the vitreous was stained with kenalog.  The hyaloid was attached.     A posterior vitreous detachment was induced with the vitreous cutter. The periphery was trimmed with depression and inspected. It was noted that there was inferior chorioretinal atrophy. The retina was attached without any tears or breaks.     Endolaser barricade was placed 360 degrees around the anterior periphery. A scleral depressed exam was repeated. There were no new tears or breaks.     The supranasal sclerotomy was sutured  with 6-0 plain suture and as tight.     The case was then turned over to Dr. Webster and her team.     The surgery was assisted by  Rigo Loco MD, because no qualified resident was available to assist on the day of surgery.  Due to the delicate and complex nature of this surgery, a skilled assistant like Dr. Loco was required. He assisted with pars plana vitrectomy and endolaser.  I was present for the entire surgery.

## 2023-04-18 ENCOUNTER — OFFICE VISIT (OUTPATIENT)
Dept: OPHTHALMOLOGY | Facility: CLINIC | Age: 23
End: 2023-04-18
Attending: OPHTHALMOLOGY
Payer: COMMERCIAL

## 2023-04-18 ENCOUNTER — TELEPHONE (OUTPATIENT)
Dept: OPHTHALMOLOGY | Facility: CLINIC | Age: 23
End: 2023-04-18

## 2023-04-18 DIAGNOSIS — Z98.890 POSTOPERATIVE EYE STATE: Primary | ICD-10-CM

## 2023-04-18 DIAGNOSIS — Q15.0 CONGENITAL GLAUCOMA OF RIGHT EYE: ICD-10-CM

## 2023-04-18 PROCEDURE — G0463 HOSPITAL OUTPT CLINIC VISIT: HCPCS | Performed by: OPHTHALMOLOGY

## 2023-04-18 PROCEDURE — 99024 POSTOP FOLLOW-UP VISIT: CPT | Mod: GC | Performed by: OPHTHALMOLOGY

## 2023-04-18 PROCEDURE — G0463 HOSPITAL OUTPT CLINIC VISIT: HCPCS | Mod: 27 | Performed by: OPHTHALMOLOGY

## 2023-04-18 PROCEDURE — 99024 POSTOP FOLLOW-UP VISIT: CPT | Mod: 25 | Performed by: OPHTHALMOLOGY

## 2023-04-18 ASSESSMENT — TONOMETRY
IOP_METHOD: TACTILE
IOP_METHOD: TONOPEN
OD_IOP_MMHG: 17

## 2023-04-18 ASSESSMENT — SLIT LAMP EXAM - LIDS
COMMENTS: EXOPHTHALMOS R>L
COMMENTS: EXOPHTHALMOS R>L

## 2023-04-18 ASSESSMENT — VISUAL ACUITY
OS_SC: 20/80
METHOD: SNELLEN - LINEAR
METHOD: SNELLEN - LINEAR
OD_SC: CF @ 2FT
OS_SC+: -2
OS_SC+: -2
OS_SC: 20/80

## 2023-04-18 ASSESSMENT — EXTERNAL EXAM - LEFT EYE: OS_EXAM: PROPTOSIS R>L, FAIR LID CLOSURE

## 2023-04-18 NOTE — PATIENT INSTRUCTIONS
In the Right surgery eye  - Atropine which is a red top drop - apply 1 drop daily a day  - Prednisalone acetate 1% which is a white or pink top drop -  apply 1 drop 4 times a day until Dr Rainey says to decrease  - Moxifloxacin which is a tan top drop - apply 1 drop 4 times a day for the next 10 days  Hold off glaucoma drops in the right eye     In the Left non surgery eye  - Alphagan (brimonidine) which is a purple top drop - apply 1 drop 2 times a day  - Cosopt (timolol and dorzolamide) which is a blue top drop - apply 1 drop two times a day  - Xalatan (latanoprost) which is a green top drop - apply 1 drop at bedtime  - FML (white top) - apply 1 drop 2 times a day

## 2023-04-18 NOTE — ANESTHESIA CARE TRANSFER NOTE
Patient: Ameena Solorzano    Procedure: Procedure(s):  INSERTION, IMPLANT, RIGHT EYE VALVE  Right Vitrectomy Parsplana with 25 Gauge System - Right; endolaser; peeling of vitreous membrane  Exam Under Anesthesia Eye(s) Left       Diagnosis: Congenital glaucoma of right eye [Q15.0]  Diagnosis Additional Information: No value filed.    Anesthesia Type:   General     Note:    Oropharynx: oropharynx clear of all foreign objects and spontaneously breathing  Level of Consciousness: drowsy  Oxygen Supplementation: face mask  Level of Supplemental Oxygen (L/min / FiO2): 8  Independent Airway: airway patency satisfactory and stable  Dentition: dentition unchanged  Vital Signs Stable: post-procedure vital signs reviewed and stable  Report to RN Given: handoff report given  Patient transferred to: PACU    Handoff Report: Identifed the Patient, Identified the Reponsible Provider, Reviewed the pertinent medical history, Discussed the surgical course, Reviewed Intra-OP anesthesia mangement and issues during anesthesia, Set expectations for post-procedure period and Allowed opportunity for questions and acknowledgement of understanding      Vitals:  Vitals Value Taken Time   BP 96/61    Temp 36.4    Pulse 80 04/17/23 1904   Resp 28 04/17/23 1904   SpO2 97 % 04/17/23 1904   Vitals shown include unvalidated device data.    Electronically Signed By: MARLEE Weeks CRNA  April 17, 2023  7:05 PM

## 2023-04-18 NOTE — TELEPHONE ENCOUNTER
In the Right surgery eye  - Atropine which is a red top drop - apply 1 drop daily a day  - Prednisalone acetate 1% which is a white or pink top drop -  apply 1 drop 4 times a day until Dr Rainey says to decrease  - Moxifloxacin which is a tan top drop - apply 1 drop 4 times a day for the next 10 days  Hold off glaucoma drops in the right eye      In the Left non surgery eye  - Alphagan (brimonidine) which is a purple top drop - apply 1 drop 2 times a day  - Cosopt (timolol and dorzolamide) which is a blue top drop - apply 1 drop two times a day  - Xalatan (latanoprost) which is a green top drop - apply 1 drop at bedtime  - FML (white top) - apply 1 drop 2 times a day        ----- I copied/pasted patient instructions above and sent in [employer] message per request    Luek Hightower RN 1:01 PM 04/18/23          [facility]    Phone Message    May a detailed message be left on voicemail: yes     Reason for Call: Other: Mother called requesting a copy of the medications and directions that were discussed at todays visit. She said they were printed out and someone must have forgot to give them to her. Please send this to [employer]. Thanks.     Action Taken: Other: eye    Travel Screening: Not Applicable

## 2023-04-18 NOTE — ANESTHESIA POSTPROCEDURE EVALUATION
Patient: Ameena Solorzano    Procedure: Procedure(s):  INSERTION, IMPLANT, RIGHT EYE VALVE  Right Vitrectomy Parsplana with 25 Gauge System - Right; endolaser; peeling of vitreous membrane  Exam Under Anesthesia Eye(s) Left       Anesthesia Type:  General    Note:  Disposition: Outpatient   Postop Pain Control: Uneventful            Sign Out: Well controlled pain   PONV: No   Neuro/Psych: Uneventful            Sign Out: Acceptable/Baseline neuro status   Airway/Respiratory: Uneventful            Sign Out: Acceptable/Baseline resp. status   CV/Hemodynamics: Uneventful            Sign Out: Acceptable CV status; No obvious hypovolemia; No obvious fluid overload   Other NRE: NONE   DID A NON-ROUTINE EVENT OCCUR? No           Last vitals:  Vitals Value Taken Time   BP 97/56 04/17/23 1945   Temp 36.9  C (98.5  F) 04/17/23 1946   Pulse 0 04/17/23 1947   Resp 28 04/17/23 1947   SpO2 95 % 04/17/23 1952   Vitals shown include unvalidated device data.    Electronically Signed By: Anand Gonsales DO  April 17, 2023  11:21 PM

## 2023-04-18 NOTE — NURSING NOTE
Chief Complaints and History of Present Illnesses   Patient presents with     Post Op (Ophthalmology) Right Eye     S/p Right Vitrectomy Parsplana with 25 Gauge System - Right; endolaser; peeling of vitreous membrane (Right: Eye). 4/17/2023 Dr. Webster/ Dr. Calderon      Chief Complaint(s) and History of Present Illness(es)     Post Op (Ophthalmology) Right Eye            Laterality: right eye    Onset: 1    Associated symptoms: Negative for eye pain, headache and nausea    Pain scale: 0/10    Comments: S/p Right Vitrectomy Parsplana with 25 Gauge System - Right; endolaser; peeling of vitreous membrane (Right: Eye). 4/17/2023 Dr. Webster/ Dr. Calderon           Comments    Pt slept alright last night.  She has no recollection of anything happening since this morning.   No complaints currently.     Current Ocular Meds:  -Atropine BID right eye  -Dorzolamide BID OU  - Cosopt BID, OU  - Alphagan BID OU  - Xalatan QHS each eye  - Vigamox TID right eye.   - FML BID left eye only   - Pred forte TID right eye   - Erythromycin ointment QID right eye    - Anthony ointment at nighttime left eye   - Artificial tears PRN each eye   - Oral diamox 250 mg BID (started 3/19/23).     ALAN BEAN COA April 18, 2023 10:00 AM

## 2023-04-18 NOTE — PROGRESS NOTES
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 John Lira (at age 10 days left eye, age 12 days right eye), s/p repeat PK in the right eye 8/18/21.  She began following Dr. Reyna for glaucoma management.   - 3/14/23: bilateral EUA and right eye corneal sutures removal. She is currently still on alphagan, cosopt, xalatan each eye.   - 04/17/2023 GENTRY pars plana placement right eye   - 04/18/2023: POD#1 s/p GENTRY, denies pain, denies significant discomfort.     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   Previous glaucoma surgery/laser: spontaneous bleb formation right eye post patch graft  Maximum intraocular pressure: unknown   Currently Meds: xalatan at bedtime each eye, alphagan BID each eye, and cosopt BID each eye   Gonio: unable to perform   Refractive status: Myopia  Steroid exposure: positive, topical FML BID each eye   Meds AEs/intolerance: None   PMHx: No history of Asthma and respiratory problems/Cardiac/Renal/Kidney stones/Sulfa Allergy  Prior testing:  Fundus photo (5/4/21): poor quality due to nystagmus    Optic nerve demonstrates increased cupping in the right eye compared to prior photos (03/13/23 EUA)    Testing today 04/18/23  IOP: good low teen's palpation, 17 tonopen   Good view to posterior pole   AC residual Healon (poor fill intraop due to shallow AC)     Additional Ocular History:     2. Pressley anomaly both eyes  - S/P bilateral penetrating keratoplasty (PK) as an infant (2000), with revision  - S/p EDTA chelation right eye 9/3/2020.  Over the interval, working with Dr. Garcia on scleral lens fittings.  - s/p  repeat PK, right eye 8/18/21 - with edema and KNV   - Using PF TID right eye    - Follows w/ Dr. Fam and Dr. 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 Congenital nystagmus     5. Dandy Walker Syndrome     6. Exotropia of right eye  - Stable  - Following with Dr. Fam      Plan/Recommendations:    Discussed findings with patient's parents.    Compared to the ONH photos performed by Dr. Fam during EUA on 09/03/2020 there is progressive cupping of the right optic nerve. ONH imaging from the time of EUA at the time of Dr. Reyna not visible. Recommended GENTRY tube shunt.     POD#1 s/p pars plana GENTRY right eye 04/17/2023 with Retina. Doing well, no infection, good IOP    Stop Diamox 250 mg BID, hold off all glaucoma drops right eye    Start Atropine which is a red top drop - apply 1 drop two times a day    Start Prednisalone acetate 1% which is a white or pink top drop -  apply 1 drop 4 times a day    Start Moxifloxacin which is a tan top - apply 1 drop 4 times a day    In the left eye: continue FML eye drop once a day, brimonidine 2x/day, Cosopt 2x/day, latanoprost qHS     RTC: 6 days [facility], IOP    Physician Attestation     Attending Physician Attestation:  Complete documentation of historical and exam elements from today's encounter can be found in the full encounter summary report (not reduplicated in this progress note). I personally obtained the chief complaint(s) and history of present illness. I confirmed and edited as necessary the review of systems, past medical/surgical history, family history, social history, and examination findings as documented by others; and I examined the patient myself. I personally reviewed the relevant tests, images, and reports as documented above. I formulated and edited as necessary the assessment and plan and discussed the findings and management plan with the patient and any family members present at the time of the visit.  Jono  CLAUDINE Webster., Glaucoma, April 18, 2023     My privilege to be part of your care,  Robinson Stone MD, MSc  Ophthalmology PGY-3 resident physician

## 2023-04-18 NOTE — NURSING NOTE
Chief Complaints and History of Present Illnesses   Patient presents with     Post Op (Ophthalmology) Right Eye     S/p Right Vitrectomy Parsplana with 25 Gauge System - Right; endolaser; peeling of vitreous membrane (Right: Eye). 4/17/2023 Dr. Webster/ Dr. Calderon        Chief Complaint(s) and History of Present Illness(es)     Post Op (Ophthalmology) Right Eye            Laterality: right eye    Onset: 1 day ago    Comments: S/p Right Vitrectomy Parsplana with 25 Gauge System - Right; endolaser; peeling of vitreous membrane (Right: Eye). 4/17/2023 Dr. Webster/ Dr. Calderon             Comments    Pt is with her mother today who is helping answer questions.   Pt slept alright last night.  She has no recollection of anything happening since this morning.   No complaints currently.     Current Ocular Meds:  -Atropine BID right eye  -Dorzolamide BID OU  - Cosopt BID, OU  - Alphagan BID OU  - Xalatan QHS each eye  - Vigamox TID right eye.   - FML BID left eye only   - Pred forte TID right eye   - Erythromycin ointment QID right eye    - Anthony ointment at nighttime left eye   - Artificial tears PRN each eye   - Oral diamox 250 mg BID (started 3/19/23).     ALAN BEAN COA April 18, 2023 10:00 AM

## 2023-04-18 NOTE — TELEPHONE ENCOUNTER
Scheduled a 1 week and 1 month Post-op with Dr. Jono Webster and a 1 month Post-op with Dr. Luc Rao.

## 2023-04-18 NOTE — PROGRESS NOTES
Postoperative day 1 status post PPV by Yumiko combined with GENTRY implantation by Eleanor 4/17/23    Slept well; no pain  Retina attached  Doing well    Plan:  No heavy lifting   Jacinto shield at all times  Retina detachment and endophthalmitis precautions were discussed with the patient and was asked to return if any of the those occur    Medications to operative eye  Drops as instructed by Dr. Webster   Follow with Dr. Webster as instructed; no need for POW1 appt with me  Follow up with me in one month for DFE right eye     Complete documentation of historical and exam elements from today's encounter can be found in the full encounter summary report (not reduplicated in this progress note). I personally obtained the chief complaint(s) and history of present illness.  I confirmed and edited as necessary the review of systems, past medical/surgical history, family history, social history, and examination findings as documented by others; and I examined the patient myself. I personally reviewed the relevant tests, images, and reports as documented above. I formulated and edited as necessary the assessment and plan and discussed the findings and management plan with the patient and family.    Luc Calderon MD  , Vitreoretinal surgery   Department of Ophthalmology  [facility]

## 2023-04-19 NOTE — OP NOTE
Ameena Solorzano  [id number]  2023    PREOPERATIVE DIAGNOSIS: Congenital Glaucoma, right eye     POSTOPERATIVE DIAGNOSIS: Same as pre-operative diagnosis    OPERATION PERFORMED: Procedure(s):  1. RIGHT EYE AHMED GLAUCOMA VALVE INSERTION WITH CORNEAL PATCH GRAFT  2. Right Vitrectomy Parsplana with 25 Gauge System - Right; endolaser; peeling of vitreous membrane  3. LEFT EYE EXAM UNDER ANESTHESIA    SURGEON:  Surgeon(s) and Role:  Panel 1:     * Jono Webster MD - Primary     * Robinson Stone MD - Resident - Assisting  Panel 2:     * Luc Dc MD - Primary     * Bacilio Camacho MD - Resident - Assisting     * Rigo Loco MD - Fellow - Assisting    ANESTHESIA: General     COMPLICATIONS:  none    FINDINGS:  Anatomy as expected    ESTIMATED BLOOD LOSS:   minimal    SPECIMENS:  * No specimens in log *    IMPLANTS:  Implant Name Type Inv. Item Serial No.  Lot No. LRB No. Used Action   EYE IMP VALVE AHMED FLEXIBLE FP7 - [id number] Lens/Eye Implant EYE IMP VALVE AHMED FLEXIBLE F [facility] WORLD IMPORTS  Right 1 Implanted   EYE IMP GRAFT CORNEA 1/2 HALO 1/2 THICK HCO-HH1 - [id number] Bone/Tissue/Biologic EYE IMP GRAFT CORNEA 1/2 HALO 1/2 THICK HCO-HH1 [id number]  [id number] Right 1 Implanted       PROCEDURE:  Betadine paint and drop in holding room  Prep and drape in usual manner in OR  Time out according to protocol   Superotemporal traction suture 7-0 vicryl  Superotemporal fornix-based conjunctival flap.  Bleeding controlled with cautery  Adequate pocket created using blunt and sharp dissection.   Ahmed primed with BSS  Implant sutured onto the globe using 8-0 nylon sutures with the knot rotated into the eyelets of the implant.  The vitrectomy and endolaser procedure was performed at this point by Dr. Calderon, please refer to his procedure note for details.   Attention was then shifted again to the tube surgery  Tube trimmed to appropriate length with  bevel away away from the iris  23 gauge needle track made into the posterior vitreous cavity   Tube threaded into the eye  Tube secured to globe with an 7-0 vicryl suture  Corneal patch graft placed over the anterior portion of the tube and secured with 7-0 vicryl  Conjunctiva closed with 7-0 and 8-0 sutures  Temporal paracentesis  Healon injected into the anterior chamber  Subtenon anesthesia   Subconjunctival injection of antibiotic and steroid  Removal of traction suture  Topical atropine   Topical antibiotic and steroid ointment   Dry sterile dressing    The patient tolerated the procedure well. There were no unexpected findings or complications.

## 2023-04-25 ENCOUNTER — OFFICE VISIT (OUTPATIENT)
Dept: OPHTHALMOLOGY | Facility: CLINIC | Age: 23
End: 2023-04-25
Attending: OPHTHALMOLOGY
Payer: COMMERCIAL

## 2023-04-25 DIAGNOSIS — Q15.0 CONGENITAL GLAUCOMA OF RIGHT EYE: ICD-10-CM

## 2023-04-25 DIAGNOSIS — Z98.890 POSTOPERATIVE EYE STATE: Primary | ICD-10-CM

## 2023-04-25 DIAGNOSIS — H55.01 NYSTAGMUS, CONGENITAL: ICD-10-CM

## 2023-04-25 DIAGNOSIS — Z94.7 CORNEA REPLACED BY TRANSPLANT: ICD-10-CM

## 2023-04-25 DIAGNOSIS — H43.393 VITREOUS SYNERESIS, BILATERAL: ICD-10-CM

## 2023-04-25 PROCEDURE — G0463 HOSPITAL OUTPT CLINIC VISIT: HCPCS | Performed by: OPHTHALMOLOGY

## 2023-04-25 PROCEDURE — 99024 POSTOP FOLLOW-UP VISIT: CPT | Performed by: OPHTHALMOLOGY

## 2023-04-25 PROCEDURE — 76512 OPH US DX B-SCAN: CPT | Performed by: OPHTHALMOLOGY

## 2023-04-25 PROCEDURE — 999N000103 HC STATISTIC NO CHARGE FACILITY FEE

## 2023-04-25 PROCEDURE — G0463 HOSPITAL OUTPT CLINIC VISIT: HCPCS | Mod: 27 | Performed by: OPHTHALMOLOGY

## 2023-04-25 PROCEDURE — 99207 ULTRASOUND B-SCAN OD (RIGHT EYE): CPT | Mod: 26 | Performed by: OPHTHALMOLOGY

## 2023-04-25 ASSESSMENT — VISUAL ACUITY
OS_PH_CC: 20/60
OD_CC: CF @ 3'
METHOD: SNELLEN - LINEAR
OD_PH_CC: 20/500
METHOD: SNELLEN - LINEAR
OD_CC: CF @3'
OS_CC: 20/80
OS_PH_CC+: -2
OS_CC: 20/80
OS_PH_CC: 20/60
OS_PH_CC+: -2

## 2023-04-25 ASSESSMENT — SLIT LAMP EXAM - LIDS
COMMENTS: EXOPHTHALMOS R>L

## 2023-04-25 ASSESSMENT — REFRACTION_WEARINGRX
OS_AXIS: 120
OS_SPHERE: -6.50
OD_SPHERE: -12.50
OS_CYLINDER: +3.00
OD_AXIS: 120
SPECS_TYPE: SVL
OD_CYLINDER: +6.00

## 2023-04-25 ASSESSMENT — TONOMETRY
OS_IOP_MMHG: 09
OD_IOP_MMHG: 19
OS_IOP_MMHG: 9
IOP_METHOD: ICARE
OD_IOP_MMHG: 19
IOP_METHOD: ICARE

## 2023-04-25 ASSESSMENT — CONF VISUAL FIELD
METHOD: COUNTING FINGERS
OD_SUPERIOR_TEMPORAL_RESTRICTION: 1
OD_SUPERIOR_NASAL_RESTRICTION: 1
OS_SUPERIOR_TEMPORAL_RESTRICTION: 0
OS_SUPERIOR_NASAL_RESTRICTION: 0
OD_INFERIOR_TEMPORAL_RESTRICTION: 1
OS_INFERIOR_TEMPORAL_RESTRICTION: 0
OS_INFERIOR_NASAL_RESTRICTION: 0
OS_NORMAL: 1

## 2023-04-25 ASSESSMENT — EXTERNAL EXAM - LEFT EYE: OS_EXAM: PROPTOSIS R>L, FAIR LID CLOSURE

## 2023-04-25 NOTE — NURSING NOTE
Chief Complaints and History of Present Illnesses   Patient presents with     Post Op (Ophthalmology) Right Eye     1 week post op Right Eye PPV by Yumiko combined with GENTRY implantation by Eleanor 4/17/23.      Chief Complaint(s) and History of Present Illness(es)     Post Op (Ophthalmology) Right Eye            Laterality: right eye    Associated symptoms: redness, tearing and discharge.  Negative for eye pain    Treatments tried: eye drops    Pain scale: 0/10    Comments: 1 week post op Right Eye PPV by Yumiko combined with GENTRY implantation by Eleanor 4/17/23.          Comments    Eye meds: EES NALLELY at night, atropine right eye 1 x daily, predinsolone 4 x daily right eye, moxifloxacin 4 x daily right eye xalatan right eye at night, alphagan left eye BID, cosopt left eye BID, xalatan left eye at night  Right eye some redness, tearing, matter per dad.  Patient denies eye pain either eye.    BHANU Juárez 4/25/2023 8:28 AM

## 2023-04-25 NOTE — PROGRESS NOTES
S/P right eye PPV by Yumiko combined with GENTRY implantation by Eleanor 4/17/23    Doing well; vision is becoming more clear  No pain  Retina attached (in exam and b scan)  Doing well    Plan:  No heavy lifting   Retina detachment and endophthalmitis precautions were discussed with the patient and was asked to return if any of the those occur    Medications to operative eye  Drops as instructed by Dr. Webster   Follow with Dr. Webster as instructed  Follow up with me in one month for DFE right eye     Complete documentation of historical and exam elements from today's encounter can be found in the full encounter summary report (not reduplicated in this progress note). I personally obtained the chief complaint(s) and history of present illness.  I confirmed and edited as necessary the review of systems, past medical/surgical history, family history, social history, and examination findings as documented by others; and I examined the patient myself. I personally reviewed the relevant tests, images, and reports as documented above. I formulated and edited as necessary the assessment and plan and discussed the findings and management plan with the patient and family.    Luc Calderon MD  , Vitreoretinal surgery   Department of Ophthalmology  [facility]

## 2023-04-25 NOTE — PROGRESS NOTES
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 John Lira (at age 10 days left eye, age 12 days right eye), s/p repeat PK in the right eye 8/18/21.  She began following Dr. Reyna for glaucoma management.   - 3/14/23: bilateral EUA and right eye corneal sutures removal. She is currently still on alphagan, cosopt, xalatan each eye.   - 04/17/2023 GENTRY pars plana placement right eye   - 04/18/2023: POD#1 s/p GENTRY, denies pain, denies significant discomfort.     - Today, 04/25/2023, patient's father says there is some redness still in the right eye and mattering; patient denies pain or vision changes in the right eye.     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   Previous glaucoma surgery/laser: spontaneous bleb formation right eye post patch graft  Maximum intraocular pressure: unknown   Currently Meds: xalatan at bedtime each eye, alphagan BID each eye, and cosopt BID each eye   Gonio: unable to perform   Refractive status: Myopia  Steroid exposure: positive, topical FML BID each eye   Meds AEs/intolerance: None   PMHx: No history of Asthma and respiratory problems/Cardiac/Renal/Kidney stones/Sulfa Allergy  Prior testing:  Fundus photo (5/4/21): poor quality due to nystagmus    Optic nerve demonstrates increased cupping in the right eye compared to prior photos (03/13/23 EUA)    Testing today 04/25/23  IOP: good low teen's palpation, 17 tonopen   Healing well, tube covered, no infection    Additional Ocular History:     2. Preslsey anomaly both eyes  - S/P bilateral penetrating keratoplasty (PK) as an infant (2000), with revision  - S/p EDTA  chelation right eye 9/3/2020.  Over the interval, working with Dr. Garcia on scleral lens fittings.  - s/p repeat PK, right eye 8/18/21 - with edema and KNV   - Using PF TID right eye    - Follows w/ Dr. Fam and Dr. 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 Congenital nystagmus     5. Dandy Walker Syndrome     6. Exotropia of right eye  - Stable  - Following with Dr. Fam      Plan/Recommendations:    Discussed findings with patient's parents.    Compared to the ONH photos performed by Dr. Fam during EUA on 09/03/2020 there is progressive cupping of the right optic nerve. ONH imaging from the time of EUA at the time of Dr. Reyna not visible. Recommended GENTRY tube shunt.     Now POW#1 s/p pars plana GENTRY right eye 04/17/2023 with Retina. Doing well, no infection, good IOP    Stop Atropine right eye     Prednisolone acetate 4 times a day right eye     Moxifloxacin 4 times a day for 3 more days right eye     Restart Cosopt BID right eye     Restart Xalatan (latanoprost) right eye    Restart erythromycin ointment right eye     Continue Alphagan BID left eye     Continue Cosopt BID left eye     Continue Xalatan at bedtime left eye    Continue FML (white top) - apply 1 drop 2 times a day left eye    RTC: 3 weeks [facility], IOP    Physician Attestation     Attending Physician Attestation:  Complete documentation of historical and exam elements from today's encounter can be found in the full encounter summary report (not reduplicated in this progress note). I personally obtained the chief complaint(s) and history of present illness. I confirmed and edited as necessary the review of systems, past medical/surgical history, family history, social history, and examination findings as documented by others; and I examined the patient myself. I personally reviewed the relevant tests, images, and reports as documented above. I formulated and edited as necessary the assessment and plan and  discussed the findings and management plan with the patient and any family members present at the time of the visit.  Jono Webster M.D., Glaucoma, April 25, 2023

## 2023-04-25 NOTE — PATIENT INSTRUCTIONS
In the Right surgery eye  - Stop Atropine which is a red top drop   - Prednisolone acetate 1% which is a white or pink top drop -  apply 1 drop 4 times a day  - Moxifloxacin which is a tan top drop - apply 1 drop 4 times a day for 3 more days   - Restart Cosopt (timolol and dorzolamide) which is a blue top drop - apply 1 drop two times a day  - Restart Xalatan (latanoprost) which is a green top drop - apply 1 drop at bedtime  - Restart erythromycin ointment - apply 0.5 inch 2 times a day. DO THIS OINTMENT LAST otherwise it forms a barrier to prevent other eyedrops from absorbing.     In the Left non surgery eye  - Alphagan (brimonidine) which is a purple top drop - apply 1 drop 2 times a day  - Cosopt (timolol and dorzolamide) which is a blue top drop - apply 1 drop two times a day  - Xalatan (latanoprost) which is a green top drop - apply 1 drop at bedtime  - FML (white top) - apply 1 drop 2 times a day

## 2023-05-05 DIAGNOSIS — Q15.0 CONGENITAL GLAUCOMA: ICD-10-CM

## 2023-05-15 DIAGNOSIS — Q13.4 PETER'S ANOMALY: ICD-10-CM

## 2023-05-16 ENCOUNTER — OFFICE VISIT (OUTPATIENT)
Dept: OPHTHALMOLOGY | Facility: CLINIC | Age: 23
End: 2023-05-16
Attending: OPHTHALMOLOGY
Payer: COMMERCIAL

## 2023-05-16 DIAGNOSIS — Q15.0 CONGENITAL GLAUCOMA: ICD-10-CM

## 2023-05-16 DIAGNOSIS — Z98.890 POSTOPERATIVE EYE STATE: Primary | ICD-10-CM

## 2023-05-16 DIAGNOSIS — Q13.4 PETER'S ANOMALY: ICD-10-CM

## 2023-05-16 PROCEDURE — 99024 POSTOP FOLLOW-UP VISIT: CPT | Performed by: OPHTHALMOLOGY

## 2023-05-16 PROCEDURE — 99024 POSTOP FOLLOW-UP VISIT: CPT | Mod: GC | Performed by: OPHTHALMOLOGY

## 2023-05-16 PROCEDURE — G0463 HOSPITAL OUTPT CLINIC VISIT: HCPCS | Performed by: OPHTHALMOLOGY

## 2023-05-16 PROCEDURE — 999N000103 HC STATISTIC NO CHARGE FACILITY FEE

## 2023-05-16 RX ORDER — DORZOLAMIDE HYDROCHLORIDE AND TIMOLOL MALEATE 20; 5 MG/ML; MG/ML
1 SOLUTION/ DROPS OPHTHALMIC 2 TIMES DAILY
Qty: 10 ML | Refills: 0 | Status: SHIPPED | OUTPATIENT
Start: 2023-05-16 | End: 2023-06-01

## 2023-05-16 ASSESSMENT — TONOMETRY
OD_IOP_MMHG: 15
IOP_METHOD: TONOPEN
OS_IOP_MMHG: 15
IOP_METHOD: TONOPEN
OD_IOP_MMHG: 15
OS_IOP_MMHG: 15

## 2023-05-16 ASSESSMENT — REFRACTION_WEARINGRX
OS_CYLINDER: +3.00
OS_AXIS: 120
OD_CYLINDER: +6.00
OD_AXIS: 120
SPECS_TYPE: SVL
OS_SPHERE: -6.50
OD_SPHERE: -12.50

## 2023-05-16 ASSESSMENT — VISUAL ACUITY
OS_PH_CC+: +2
OS_PH_CC: 20/60
METHOD: SNELLEN - LINEAR
METHOD: SNELLEN - LINEAR
OD_CC: 20/500
CORRECTION_TYPE: GLASSES
OS_PH_CC+: +2
OS_PH_CC: 20/60
OD_CC: 20/500
OS_CC: 20/70
OS_CC: 20/70

## 2023-05-16 ASSESSMENT — SLIT LAMP EXAM - LIDS
COMMENTS: EXOPHTHALMOS R>L

## 2023-05-16 ASSESSMENT — EXTERNAL EXAM - LEFT EYE: OS_EXAM: PROPTOSIS R>L, FAIR LID CLOSURE

## 2023-05-16 NOTE — PATIENT INSTRUCTIONS
In the Right surgery eye  - Prednisolone acetate 1% which is a white or pink top drop -    Apply 1 drop 2 times a day for 1 week, THEN   Apply 1 drop daily for 1 week then SWITCH BACK to     FML 2 drops per day   - Cosopt (timolol and dorzolamide) which is a blue top drop - apply 1 drop two times a day  - Xalatan (latanoprost) which is a green top drop - apply 1 drop at bedtime  - Erythromycin ointment - apply 0.5 inch 2 times a day. DO THIS OINTMENT LAST otherwise it forms a barrier to prevent other eyedrops from absorbing.     In the Left non surgery eye  - Alphagan (brimonidine) which is a purple top drop - apply 1 drop 2 times a day  - Cosopt (timolol and dorzolamide) which is a blue top drop - apply 1 drop two times a day  - Xalatan (latanoprost) which is a green top drop - apply 1 drop at bedtime  - FML (white top) - apply 1 drop 2 times a day

## 2023-05-16 NOTE — PROGRESS NOTES
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 John Lira (at age 10 days left eye, age 12 days right eye), s/p repeat PK in the right eye 8/18/21.  She began following Dr. Reyna for glaucoma management.   - 3/14/23: bilateral EUA and right eye corneal sutures removal.    - 04/17/2023 GENTRY pars plana placement right eye     - Today, 05/16/2023, patient's father and patient says there is no discomfort, no pain. Redness got way better. She is currently still on alphagan, cosopt, xalatan each eye but ran out of Cosopt so has not had that this morning.     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   Previous glaucoma surgery/laser: spontaneous bleb formation right eye post patch graft   GENTRY + PPV right eye 04/17/2023  Maximum intraocular pressure: unknown   Currently Meds: xalatan at bedtime each eye, alphagan BID each eye, and cosopt BID each eye   Gonio: unable to perform   Refractive status: Myopia  Steroid exposure: positive, topical FML BID each eye   Meds AEs/intolerance: None   PMHx: No history of Asthma and respiratory problems/Cardiac/Renal/Kidney stones/Sulfa Allergy  Prior testing:  Fundus photo (5/4/21): poor quality due to nystagmus    Optic nerve demonstrates increased cupping in the right eye compared to prior photos (03/13/23 EUA)    Testing today    IOP: good low teen's palpation, 15 tonopen   Healing well, tube covered now with bleb over plate, no infection    Additional Ocular History:     2. Pressley anomaly both eyes  - S/P bilateral penetrating keratoplasty (PK) as an infant (2000), with revision  - S/p EDTA chelation  right eye 9/3/2020.  Over the interval, working with Dr. Garcia on scleral lens fittings.  - s/p repeat PK, right eye 8/18/21 - with edema and KNV   - Using PF TID right eye    - Follows w/ Dr. Fam and Dr. 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 Congenital nystagmus     5. Dandy Walker Syndrome     6. Exotropia of right eye  - Stable  - Following with Dr. Fam      Plan/Recommendations:    Discussed findings with patient's parents.    Compared to the ONH photos performed by Dr. Fam during EUA on 09/03/2020 there is progressive cupping of the right optic nerve. ONH imaging from the time of EUA at the time of Dr. Reyna not visible. Therefore, we recommended GENTRY tube shunt.     Now POM#1 s/p pars plana GENTRY right eye 04/17/2023 with Retina.   Good bleb over the GENTRY plate. IOP good.    Starting to develop a cataract in the right eye, so far [facility] not changed on the charts, discussed with patient and dad.    Decrease to PredForte BID for 1 week then daily for 1 week then switch to FML BID right eye     Continue Cosopt BID both eyes     Continue Xalatan (latanoprost) at bedtime both eyes    Continue Alphagan BID left eye     Continue erythromycin ointment right eye     Continue FML (white top) BID left eye     RTC: 4 weeks [facility], IOP    Physician Attestation     Attending Physician Attestation:  Complete documentation of historical and exam elements from today's encounter can be found in the full encounter summary report (not reduplicated in this progress note). I personally obtained the chief complaint(s) and history of present illness. I confirmed and edited as necessary the review of systems, past medical/surgical history, family history, social history, and examination findings as documented by others; and I examined the patient myself. I personally reviewed the relevant tests, images, and reports as documented above. I formulated and edited as necessary the assessment  and plan and discussed the findings and management plan with the patient and any family members present at the time of the visit.  Jono Webster M.D., Glaucoma, May 16, 2023    My privilege to be part of your care,  Robinson Stone MD, MSc  Ophthalmology PGY-3 resident physician

## 2023-05-16 NOTE — PROGRESS NOTES
S/P right eye PPV by Yumiko combined with GENTRY implantation by Eleanor 4/17/23    Doing well; vision is becoming more clear  No pain  Retina attached (in exam and b scan)  Doing well    Plan:  No heavy lifting   Retina detachment and endophthalmitis precautions were discussed with the patient and was asked to return if any of the those occur    Medications to operative eye  Drops as instructed by Dr. Webster   Follow with Dr. Webster as instructed  Follow up with me in one month for DFE right eye     Complete documentation of historical and exam elements from today's encounter can be found in the full encounter summary report (not reduplicated in this progress note). I personally obtained the chief complaint(s) and history of present illness.  I confirmed and edited as necessary the review of systems, past medical/surgical history, family history, social history, and examination findings as documented by others; and I examined the patient myself. I personally reviewed the relevant tests, images, and reports as documented above. I formulated and edited as necessary the assessment and plan and discussed the findings and management plan with the patient and family.    Luc Calderon MD  , Vitreoretinal surgery   Department of Ophthalmology  [facility]

## 2023-05-29 DIAGNOSIS — Q13.4 PETER'S ANOMALY: ICD-10-CM

## 2023-05-30 ENCOUNTER — OFFICE VISIT (OUTPATIENT)
Dept: OPHTHALMOLOGY | Facility: CLINIC | Age: 23
End: 2023-05-30
Attending: OPHTHALMOLOGY
Payer: COMMERCIAL

## 2023-05-30 DIAGNOSIS — Z94.7 PENETRATING KERATOPLASTY GRAFT IN PLACE: ICD-10-CM

## 2023-05-30 DIAGNOSIS — Q13.4 PETER'S ANOMALY: Primary | ICD-10-CM

## 2023-05-30 PROCEDURE — 99024 POSTOP FOLLOW-UP VISIT: CPT | Mod: GC | Performed by: OPHTHALMOLOGY

## 2023-05-30 PROCEDURE — G0463 HOSPITAL OUTPT CLINIC VISIT: HCPCS | Performed by: OPHTHALMOLOGY

## 2023-05-30 RX ORDER — ERYTHROMYCIN 5 MG/G
OINTMENT OPHTHALMIC
Qty: 7 G | Refills: 10 | Status: SHIPPED | OUTPATIENT
Start: 2023-05-30 | End: 2024-02-21

## 2023-05-30 ASSESSMENT — VISUAL ACUITY
METHOD: SNELLEN - LINEAR
OD_CC: 1/200E
OS_CC: 20/70

## 2023-05-30 ASSESSMENT — TONOMETRY
OS_IOP_MMHG: 9
OD_IOP_MMHG: 7
IOP_METHOD: ICARE

## 2023-05-30 ASSESSMENT — SLIT LAMP EXAM - LIDS
COMMENTS: EXOPHTHALMOS R>L
COMMENTS: EXOPHTHALMOS R>L

## 2023-05-30 ASSESSMENT — REFRACTION_WEARINGRX
OS_AXIS: 120
SPECS_TYPE: SVL
OD_SPHERE: -12.50
OS_CYLINDER: +3.00
OD_CYLINDER: +6.00
OS_SPHERE: -6.50
OD_AXIS: 120

## 2023-05-30 ASSESSMENT — EXTERNAL EXAM - LEFT EYE: OS_EXAM: PROPTOSIS R>L, FAIR LID CLOSURE

## 2023-05-30 NOTE — NURSING NOTE
Chief Complaints and History of Present Illnesses   Patient presents with     Follow Up     Chief Complaint(s) and History of Present Illness(es)     Follow Up            Laterality: both eyes    Associated symptoms: Negative for eye pain, flashes and floaters    Treatments tried: eye drops          Comments    Here for follow up. Vision is about the same since last visit. Compliant with drops. No eye pain. No flashes or floaters.    Keven MCGRAW 8:18 AM May 30, 2023

## 2023-05-30 NOTE — PROGRESS NOTES
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     Interval Hx 05/30/23:  Now s/p PPV/GENTRY right eye 4/17/23. Doing well s/p surgery. No pain in eyes. Tolerating drops ok.      Current Ocular Meds reviewed 05/30/23:  - Cosopt BID each eye  - xalatan at bedtime each eye  - alphagan BID left eye  - FML BID each eye    - Erythromycin ointment QID right eye    - Anthony ointment at nighttime left eye   - Artificial tears PRN each eye      Assessment and Plan:  # s/p EDTA chelation and superficial  Keratectomy right eye 9/3/20 (Dr. Salinas)     # Pressley anomaly both eyes 2000  - s/p repeat PK RE (8/18/21) (Jessica)  - S/P bilateral penetrating keratoplasty (PK) in 2000 age 12 days and 14 days   -  s/p EDTA chelation right eye (See above), with remaining scarring.  - follows with Dr. Fam,  Dr. Webster.   - Previously saw Dr. Garcia prior to PKP, though has not seen again since    4/12/22: Vision stable Surface dryness, no epi defects.  8/16/22: VA somewhat worsened, though subjective vision stability. Diffuse surface dryness   1/24/23: VA stable, diffuse surface dryness, stable  3/23/23: No epi defect.   05/30/23: No epi defect but surface appearing mildly irregular. Would increase erythromycin to 4x/day     PLAN:  - continue FML BID each eye   - Increase erythromycin ointment back to 6x/day right eye   - Continue Anthony jess nightly left eye  - Continue each eye lubrication.     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  - with thin bleb, but chay neg, stable, monitor   - minimize eye rubbing     # Glaucoma, each eye   - s/p PPV/GENTRY right eye   - glaucoma drops per Eleanor  - Cosopt BID each eye  - xalatan at bedtime each eye  - alphagan BID left eye    # Lagophthalmos, each eye  - clear corneas, pt comfortable   - continue refresh at bedtime both eyes       RTC: 3 months [place]    Brandie Tirado MD  Resident Physician,  PGY-3  Department of Ophthalmology    Attending Physician Attestation:  Complete documentation of historical and exam elements from today's encounter can be found in the full encounter summary report (not reduplicated in this progress note).  I personally obtained the chief complaint(s) and history of present illness.  I confirmed and edited as necessary the review of systems, past medical/surgical history, family history, social history, and examination findings as documented by others; and I examined the patient myself.  I personally reviewed the relevant tests, images, and reports as documented above.  I formulated and edited as necessary the assessment and plan and discussed the findings and management plan with the patient and family. - Rebel Lopez MD

## 2023-06-01 RX ORDER — DORZOLAMIDE HYDROCHLORIDE AND TIMOLOL MALEATE 20; 5 MG/ML; MG/ML
1 SOLUTION/ DROPS OPHTHALMIC 2 TIMES DAILY
Qty: 10 ML | Refills: 2 | Status: SHIPPED | OUTPATIENT
Start: 2023-06-01 | End: 2023-09-26

## 2023-06-01 NOTE — TELEPHONE ENCOUNTER
dorzolamide-timolol (COSOPT) 2-0.5 % ophthalmic solution      Last Written Prescription Date:  5-  Last Fill Quantity: 10 ml,   # refills: 0  Last Office Visit : 5-  Future Office visit:  6-        # Glaucoma, each eye   - s/p PPV/GETNRY right eye   - glaucoma drops per Eleanor  - Cosopt BID each eye  - xalatan at bedtime each eye  - alphagan BID left eye

## 2023-06-13 ENCOUNTER — OFFICE VISIT (OUTPATIENT)
Dept: OPHTHALMOLOGY | Facility: CLINIC | Age: 23
End: 2023-06-13
Attending: OPHTHALMOLOGY
Payer: COMMERCIAL

## 2023-06-13 DIAGNOSIS — Z98.890 POSTOPERATIVE EYE STATE: Primary | ICD-10-CM

## 2023-06-13 DIAGNOSIS — Z94.7 PENETRATING KERATOPLASTY GRAFT IN PLACE: ICD-10-CM

## 2023-06-13 DIAGNOSIS — Q13.4 PETER'S ANOMALY: ICD-10-CM

## 2023-06-13 DIAGNOSIS — Q15.0 CONGENITAL GLAUCOMA: ICD-10-CM

## 2023-06-13 DIAGNOSIS — H55.01 NYSTAGMUS, CONGENITAL: ICD-10-CM

## 2023-06-13 PROCEDURE — 99024 POSTOP FOLLOW-UP VISIT: CPT | Mod: 25 | Performed by: OPHTHALMOLOGY

## 2023-06-13 PROCEDURE — 999N000103 HC STATISTIC NO CHARGE FACILITY FEE

## 2023-06-13 PROCEDURE — 99024 POSTOP FOLLOW-UP VISIT: CPT | Mod: GC | Performed by: OPHTHALMOLOGY

## 2023-06-13 PROCEDURE — G0463 HOSPITAL OUTPT CLINIC VISIT: HCPCS | Performed by: OPHTHALMOLOGY

## 2023-06-13 PROCEDURE — G0463 HOSPITAL OUTPT CLINIC VISIT: HCPCS | Mod: 27 | Performed by: OPHTHALMOLOGY

## 2023-06-13 ASSESSMENT — SLIT LAMP EXAM - LIDS
COMMENTS: EXOPHTHALMOS R>L

## 2023-06-13 ASSESSMENT — REFRACTION_WEARINGRX
OS_CYLINDER: +3.00
OS_AXIS: 120
OS_SPHERE: -6.50
OD_CYLINDER: +6.00
SPECS_TYPE: SVL
OD_AXIS: 120
OD_SPHERE: -12.50
OS_AXIS: 120
OD_AXIS: 120
SPECS_TYPE: SVL
OS_SPHERE: -6.50
OD_SPHERE: -12.50
OS_CYLINDER: +3.00
OD_CYLINDER: +6.00

## 2023-06-13 ASSESSMENT — TONOMETRY
OD_IOP_MMHG: 14
OD_IOP_MMHG: 22
OS_IOP_MMHG: 15
OS_IOP_MMHG: 13
OD_IOP_MMHG: 22
IOP_METHOD: TONOPEN
IOP_METHOD: TONOPEN
OS_IOP_MMHG: 15

## 2023-06-13 ASSESSMENT — EXTERNAL EXAM - LEFT EYE
OS_EXAM: PROPTOSIS R>L, FAIR LID CLOSURE
OS_EXAM: PROPTOSIS R>L, FAIR LID CLOSURE

## 2023-06-13 ASSESSMENT — VISUAL ACUITY
METHOD: SNELLEN - LINEAR
CORRECTION_TYPE: GLASSES
METHOD: SNELLEN - LINEAR
OS_CC+: -1
OS_CC: 20/60
OS_CC+: -1
CORRECTION_TYPE: GLASSES
OS_CC: 20/60
OD_CC: 5/200 E
OD_CC: 5/200E

## 2023-06-13 NOTE — PROGRESS NOTES
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 John Lira (at age 10 days left eye, age 12 days right eye), s/p repeat PK in the right eye 8/18/21.  She began following Dr. Reyna for glaucoma management.   - 3/14/23: bilateral EUA and right eye corneal sutures removal.    - 04/17/2023 GENTRY pars plana placement right eye     - Today, 06/13/2023, patient's father and patient says there is no discomfort, no pain. Redness got way better. She is currently still on alphagan, cosopt, xalatan each eye but ran out of Cosopt so has not had that this morning.     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   Previous glaucoma surgery/laser: spontaneous bleb formation right eye post patch graft   GENTRY + PPV right eye 04/17/2023  Maximum intraocular pressure: unknown   Currently Meds: xalatan at bedtime each eye, alphagan BID each eye, and cosopt BID each eye   Gonio: unable to perform   Refractive status: Myopia  Steroid exposure: positive, topical FML BID each eye   Meds AEs/intolerance: None   PMHx: No history of Asthma and respiratory problems/Cardiac/Renal/Kidney stones/Sulfa Allergy  Prior testing:  Fundus photo (5/4/21): poor quality due to nystagmus    Optic nerve demonstrates increased cupping in the right eye compared to prior photos (03/13/23 EUA)    Testing today    IOP: good low teen's palpation, 14 and 13 mmHg Tonopen    Healing well, tube covered now with bleb over plate    Additional Ocular History:     2. Pressley anomaly both eyes  - S/P bilateral penetrating keratoplasty (PK) as an infant (2000), with revision  - S/p EDTA chelation  right eye 9/3/2020.  Over the interval, working with Dr. Garcia on scleral lens fittings.  - s/p repeat PK, right eye 8/18/21 - with edema and KNV   - Using PF TID right eye    - Follows w/ Dr. Fam and Dr. 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 Congenital nystagmus     5. Dandy Walker Syndrome     6. Exotropia of right eye  - Stable  - Following with Dr. Fam      Plan/Recommendations:    Discussed findings with patient's parents.    Compared to the ONH photos performed by Dr. Fam during EUA on 09/03/2020 there is progressive cupping of the right optic nerve. ONH imaging from the time of EUA at the time of Dr. Reyna not visible. Therefore, we recommended GENTRY tube shunt.     Now POM#2 s/p pars plana GENTRY right eye 04/17/2023 with Retina.   Good bleb over the GENTRY plate. IOP good.    Starting to develop a cataract in the right eye, so far VA not changed on the charts    Continue FML BID both eyes    Continue Cosopt BID both eyes     Continue Xalatan (latanoprost) at bedtime both eyes    Continue Alphagan BID left eye     Continue erythromycin ointment right eye     RTC: 4 weeks VA, IOP    Physician Attestation     Attending Physician Attestation:  Complete documentation of historical and exam elements from today's encounter can be found in the full encounter summary report (not reduplicated in this progress note). I personally obtained the chief complaint(s) and history of present illness. I confirmed and edited as necessary the review of systems, past medical/surgical history, family history, social history, and examination findings as documented by others; and I examined the patient myself. I personally reviewed the relevant tests, images, and reports as documented above. I formulated and edited as necessary the assessment and plan and discussed the findings and management plan with the patient and any family members present at the time of the visit.  Jono  CLAUDINE Webster., Glaucoma, June 13, 2023    My privilege to be part of your care,  Robinson Stone MD, MSc  Ophthalmology PGY-3 resident physician

## 2023-06-13 NOTE — NURSING NOTE
Chief Complaints and History of Present Illnesses   Patient presents with     Post Op (Ophthalmology) Right Eye     1 month follow up s/p pars plana GENTRY right eye with Retina 04/17/2023     Chief Complaint(s) and History of Present Illness(es)     Post Op (Ophthalmology) Right Eye            Laterality: right eye    Comments: 1 month follow up s/p pars plana GENTRY right eye with Retina 04/17/2023          Comments    Pt states vision is about the same as last visit. No eye pain today. No new flashes or floaters.   RE still tearing, but better since last visit.     Sagar Oconnor, [facility] June 13, 2023 1:00 PM

## 2023-06-13 NOTE — NURSING NOTE
Chief Complaints and History of Present Illnesses   Patient presents with     Post Op (Ophthalmology) Right Eye     1 month follow up S/P right eye PPV by Yumiko combined with GENTRY implantation by Eleanor 4/17/23     Chief Complaint(s) and History of Present Illness(es)     Post Op (Ophthalmology) Right Eye            Laterality: right eye    Comments: 1 month follow up S/P right eye PPV by Yumiko combined with GENTRY implantation by Eleanor 4/17/23          Comments    Pt states vision is about the same as last visit. No eye pain today. No new flashes or floaters.   RE still tearing, but better since last visit.     Sagar Oconnor, [facility] June 13, 2023 1:00 PM

## 2023-06-13 NOTE — PROGRESS NOTES
S/P right eye PPV by Yumiko combined with GENTRY implantation by Eleanor 4/17/23    Doing well; vision is becoming more clear  No pain  Retina attached (in exam and b scan)  Doing well    Plan:  Retina detachment and endophthalmitis precautions were discussed with the patient and was asked to return if any of the those occur    Medications to operative eye  Drops as instructed by Dr. Webster   Follow with Dr. Webster as instructed  Follow up with me in 6 MONTHS for DFE right eye     Complete documentation of historical and exam elements from today's encounter can be found in the full encounter summary report (not reduplicated in this progress note). I personally obtained the chief complaint(s) and history of present illness.  I confirmed and edited as necessary the review of systems, past medical/surgical history, family history, social history, and examination findings as documented by others; and I examined the patient myself. I personally reviewed the relevant tests, images, and reports as documented above. I formulated and edited as necessary the assessment and plan and discussed the findings and management plan with the patient and family.    Luc Calderon MD  , Vitreoretinal surgery   Department of Ophthalmology  [facility]

## 2023-07-14 ENCOUNTER — OFFICE VISIT (OUTPATIENT)
Dept: OPHTHALMOLOGY | Facility: CLINIC | Age: 23
End: 2023-07-14
Attending: OPHTHALMOLOGY
Payer: COMMERCIAL

## 2023-07-14 DIAGNOSIS — Z98.890 POSTOPERATIVE EYE STATE: Primary | ICD-10-CM

## 2023-07-14 PROCEDURE — G0463 HOSPITAL OUTPT CLINIC VISIT: HCPCS | Performed by: OPHTHALMOLOGY

## 2023-07-14 PROCEDURE — 99024 POSTOP FOLLOW-UP VISIT: CPT | Mod: GC | Performed by: OPHTHALMOLOGY

## 2023-07-14 ASSESSMENT — CONF VISUAL FIELD
OS_SUPERIOR_TEMPORAL_RESTRICTION: 0
OS_INFERIOR_TEMPORAL_RESTRICTION: 3
OD_SUPERIOR_TEMPORAL_RESTRICTION: 1
METHOD: COUNTING FINGERS
OD_INFERIOR_NASAL_RESTRICTION: 1
OD_SUPERIOR_NASAL_RESTRICTION: 1
OS_SUPERIOR_NASAL_RESTRICTION: 3
OD_INFERIOR_TEMPORAL_RESTRICTION: 1
OS_INFERIOR_NASAL_RESTRICTION: 3

## 2023-07-14 ASSESSMENT — TONOMETRY
IOP_METHOD: TONOPEN
OD_IOP_MMHG: 17
OS_IOP_MMHG: 13
IOP_METHOD: TONOPEN
OD_IOP_MMHG: 20
IOP_METHOD: TONOPEN
OS_IOP_MMHG: 17
OS_IOP_MMHG: 15

## 2023-07-14 ASSESSMENT — REFRACTION_WEARINGRX
SPECS_TYPE: SVL
OD_AXIS: 120
OD_SPHERE: -12.50
OS_CYLINDER: +3.00
OS_SPHERE: -6.50
OD_CYLINDER: +6.00
OS_AXIS: 120

## 2023-07-14 ASSESSMENT — VISUAL ACUITY
OS_PH_CC: 20/60
OS_CC: 20/70
OD_CC: 3/200 E
METHOD: SNELLEN - LINEAR
OS_CC+: -1
OS_PH_CC+: -1
CORRECTION_TYPE: GLASSES

## 2023-07-14 ASSESSMENT — SLIT LAMP EXAM - LIDS
COMMENTS: EXOPHTHALMOS R>L
COMMENTS: EXOPHTHALMOS R>L

## 2023-07-14 ASSESSMENT — EXTERNAL EXAM - LEFT EYE: OS_EXAM: PROPTOSIS R>L, FAIR LID CLOSURE

## 2023-07-14 NOTE — NURSING NOTE
Chief Complaints and History of Present Illnesses   Patient presents with     Follow Up     Glaucoma     Chief Complaint(s) and History of Present Illness(es)     Follow Up            Laterality: both eyes    Course: stable    Associated symptoms: Negative for dryness, eye pain, tearing and headache    Treatments tried: eye drops and ointment    Pain scale: 0/10    Comments: Glaucoma          Comments    She states that her vision has seemed stable in both eyes since her last eye exam.  Patient denies having any eye discomfort.    She is using:  FML BID both eyes  Cosopt BID both eyes   Xalatan (latanoprost) at bedtime both eyes  Alphagan BID left eye   Erythromycin ointment 4-5x a day in the right eye   Refresh PM left eye at night    MARILUZ Watkins 10:34 AM  July 14, 2023

## 2023-07-14 NOTE — PROGRESS NOTES
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 John Lira (at age 10 days left eye, age 12 days right eye), s/p repeat PK in the right eye 8/18/21.  She began following Dr. Reyna for glaucoma management.   - 3/14/23: bilateral EUA and right eye corneal sutures removal.    - 04/17/2023 GENTRY pars plana placement right eye     - Today, 07/14/2023, patient is here with her father. Things are going well. No pain. Getting in eyedrops without any issues.    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   Previous glaucoma surgery/laser: spontaneous bleb formation right eye post patch graft   GENTRY + PPV right eye 04/17/2023  Maximum intraocular pressure: unknown   Currently Meds: xalatan at bedtime each eye, alphagan BID each eye, and cosopt BID each eye   Gonio: unable to perform   Refractive status: Myopia  Steroid exposure: positive, topical FML BID each eye   Meds AEs/intolerance: None   PMHx: No history of Asthma and respiratory problems/Cardiac/Renal/Kidney stones/Sulfa Allergy  Prior testing:  Fundus photo (5/4/21): poor quality due to nystagmus    Optic nerve demonstrates increased cupping in the right eye compared to prior photos (03/13/23 EUA)    Testing today    IOP: good low teen's palpation, 20 and 15 mmHg Tonopen    Healing well, tube covered now with bleb over plate    Additional Ocular History:     2. Pressley anomaly both eyes  - S/P bilateral penetrating keratoplasty (PK) as an infant (2000), with revision  - S/p EDTA chelation right eye 9/3/2020.  Over the interval, working with Dr. Garcia on scleral lens fittings.  - s/p repeat  PK, right eye 8/18/21 - with edema and KNV   - Using PF TID right eye    - Follows w/ Dr. Fam and Dr. 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 Congenital nystagmus     5. Dandy Walker Syndrome     6. Exotropia of right eye  - Stable  - Following with Dr. Fam      Plan/Recommendations:    Discussed findings with patient's parents.    Compared to the ONH photos performed by Dr. Fam during EUA on 09/03/2020 there is progressive cupping of the right optic nerve. ONH imaging from the time of EUA at the time of Dr. Reyna not visible. Therefore, we recommended GENTRY tube shunt.     Now POM#3 s/p pars plana GENTRY right eye 04/17/2023 with Retina.   Good bleb over the GENTRY plate. IOP good.    Starting to develop a cataract in the right eye, so far VA not changed as per patient     Continue FML BID both eyes    Continue Cosopt BID both eyes     Continue Xalatan (latanoprost) at bedtime both eyes    Continue Alphagan BID left eye     Continue erythromycin ointment right eye     RTC: in 3-4 months VA, IOP     Salma Ferreira MD  Resident Physician, PGY-3  Department of Ophthalmology      Physician Attestation     Attending Physician Attestation:  Complete documentation of historical and exam elements from today's encounter can be found in the full encounter summary report (not reduplicated in this progress note). I personally obtained the chief complaint(s) and history of present illness. I confirmed and edited as necessary the review of systems, past medical/surgical history, family history, social history, and examination findings as documented by others; and I examined the patient myself. I personally reviewed the relevant tests, images, and reports as documented above. I formulated and edited as necessary the assessment and plan and discussed the findings and management plan with the patient and any family members present at the time of the visit.  Jono Webster M.D., Glaucoma,  July 14, 2023

## 2023-07-21 ENCOUNTER — OFFICE VISIT (OUTPATIENT)
Dept: OPHTHALMOLOGY | Facility: CLINIC | Age: 23
End: 2023-07-21
Attending: OPHTHALMOLOGY
Payer: COMMERCIAL

## 2023-07-21 DIAGNOSIS — H55.01 NYSTAGMUS, CONGENITAL: ICD-10-CM

## 2023-07-21 DIAGNOSIS — H54.3 LOW VISION, BOTH EYES: ICD-10-CM

## 2023-07-21 DIAGNOSIS — Q15.0 CONGENITAL GLAUCOMA: ICD-10-CM

## 2023-07-21 DIAGNOSIS — Z94.7 CORNEA REPLACED BY TRANSPLANT: ICD-10-CM

## 2023-07-21 DIAGNOSIS — Q13.4 PETER'S ANOMALY: Primary | ICD-10-CM

## 2023-07-21 DIAGNOSIS — H43.393 VITREOUS SYNERESIS, BILATERAL: ICD-10-CM

## 2023-07-21 PROCEDURE — G0463 HOSPITAL OUTPT CLINIC VISIT: HCPCS | Performed by: OPHTHALMOLOGY

## 2023-07-21 PROCEDURE — 92015 DETERMINE REFRACTIVE STATE: CPT

## 2023-07-21 PROCEDURE — 92014 COMPRE OPH EXAM EST PT 1/>: CPT | Performed by: OPHTHALMOLOGY

## 2023-07-21 ASSESSMENT — TONOMETRY
OS_IOP_MMHG: 10
OD_IOP_MMHG: 09
IOP_METHOD: ICARE

## 2023-07-21 ASSESSMENT — VISUAL ACUITY
OD_CC: 20/800
OS_CC: 20/100
OS_CC+: +2
CORRECTION_TYPE: GLASSES
METHOD: SNELLEN - LINEAR
METHOD: SNELLEN - LINEAR

## 2023-07-21 ASSESSMENT — SLIT LAMP EXAM - LIDS
COMMENTS: EXOPHTHALMOS R>L
COMMENTS: EXOPHTHALMOS R>L

## 2023-07-21 ASSESSMENT — REFRACTION
OD_SPHERE: -13.50
OS_AXIS: 120
OS_CYLINDER: +3.00
OD_AXIS: 100
OD_CYLINDER: +6.00
OS_SPHERE: -6.00

## 2023-07-21 ASSESSMENT — EXTERNAL EXAM - LEFT EYE: OS_EXAM: PROPTOSIS R>L, FAIR LID CLOSURE

## 2023-07-21 ASSESSMENT — CONF VISUAL FIELD
OD_SUPERIOR_NASAL_RESTRICTION: 1
OS_SUPERIOR_TEMPORAL_RESTRICTION: 3
OD_SUPERIOR_TEMPORAL_RESTRICTION: 1
OS_SUPERIOR_NASAL_RESTRICTION: 3

## 2023-07-21 ASSESSMENT — PACHYMETRY: OD_CT(UM): 657

## 2023-07-21 NOTE — NURSING NOTE
Chief Complaint(s) and History of Present Illness(es)     Annual Eye Exam           Otis's F/U           Comments    First visit with Dr. Fam since 04/2022. Since then, has had multiple surgeries in the right eye including a PK with Dr. Lopez, PPV/endolaser with Dr. Calderon, and Ahmed tube placement with Dr. Webster. Feels that her vision is stable. Denies pain, headaches. Denies changes in vision since her last appt with Dr. Webster one week ago. Is taking her drops with good compliance. Using FML BID both eyes, Cosopt BID both eyes, Xalatan (latanoprost) at bedtime both eyes, Alphagan BID left eye, erythromycin ointment right eye.                 Statement Selected

## 2023-07-21 NOTE — LETTER
7/21/2023    To: Cailin Maxwell MD  [facility]  [address]  [facility] [address]    Re:  Ameena Solorzano    YOB: 2000    MRN: [id number]    Dear Colleague,     It was my pleasure to see Ameena on 7/21/2023.  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 She is currently wearing glasses with plano lens for the right eye and requests new glasses prescription. They have deferred scleral lens - doesn't tolerate and will be away school making use difficult.    Doing well with her glaucoma medications with excellent intraocular pressure today. It continues to be difficult to visualize the optic nerves on clinic exam. Cycloplegic refraction shows relatively stable refractive error to her prior cycloplegic refraction.   - Updated glasses prescription provided.   - Continue present management and follow up with Jessica Wilkins, and Yumiko as planned. Recommend follow up for repeat cycloplegic refraction in 1 year. While Lyndsay is now an adult she requires cycloplegic refraction for her glasses prescription. She can have this done with her adult ophthalmology team or with our team if they prefer. Put down for 1 year follow up with us for this.      Thank you for the opportunity to care for Ameena. I have asked her to Return in about 1 year (around 7/21/2024) for Vision & alignment, CRx & Dilated Exam.  Until then, please do not hesitate to contact me or my clinic with any questions or concerns.          Warm regards,          Romina Fam MD                 Pediatric Ophthalmology & Strabismus        Department of Ophthalmology & Visual Neurosciences        [facility]   CC:  MD Rebel Suresh MD  Lyndsay MARISCALGildardo  Rashad

## 2023-07-21 NOTE — PROGRESS NOTES
Chief Complaint(s) and History of Present Illness(es)     Annual Eye Exam           Otis's F/U           Comments    First visit with Dr. Fam since 04/2022. Since then, has had multiple surgeries in the right eye including a PK with Dr. Lopez, PPV/endolaser with Dr. Calderon, and Ahmed tube placement with Dr. Webster. Feels that her vision is stable. Denies pain, headaches. Denies changes in vision since her last appt with Dr. Webster one week ago. Is taking her drops with good compliance. Using FML BID both eyes, Cosopt BID both eyes, Xalatan (latanoprost) at bedtime both eyes, Alphagan BID left eye, erythromycin ointment right eye.     Current glasses have plano glass in the right lens.     Going to camp this week - will be last time can go to              Review of systems for the eyes was negative other than the pertinent positives and negatives noted in the HPI. History is obtained from the patient and father.     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 She is currently wearing glasses with plano lens for the right eye and requests new glasses prescription. They have deferred scleral lens - doesn't tolerate and will be away school making use difficult.    Doing well with her glaucoma medications with excellent intraocular pressure today. It continues to be difficult to visualize the optic nerves on clinic exam. Cycloplegic refraction shows relatively stable refractive error to her prior cycloplegic refraction.   - Updated glasses prescription provided.   - Continue present management and follow up with Jessica Wilkins and Nazari as planned. Recommend follow up for repeat cycloplegic  refraction in 1 year. While Lyndsay is now an adult she requires cycloplegic refraction for her glasses prescription. She can have this done with her adult ophthalmology team or with our team if they prefer. Put down for 1 year follow up with us for this.        Return in about 1 year (around 7/21/2024) for Vision & alignment, CRx & Dilated Exam.    There are no Patient Instructions on file for this visit.    Visit Diagnoses & Orders    ICD-10-CM    1. Peter's anomaly  Q13.4       2. Nystagmus, congenital  H55.01       3. Congenital glaucoma  Q15.0       4. 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 Vitreous syneresis, bilateral  H43.393       6. Low vision, both eyes  H54.3          Attending Physician Attestation:  Complete documentation of historical and exam elements from today's encounter can be found in the full encounter summary report (not reduplicated in this progress note).  I personally obtained the chief complaint(s) and history of present illness.  I confirmed and edited as necessary the review of systems, past medical/surgical history, family history, social history, and examination findings as documented by others; and I examined the patient myself.  I personally reviewed the relevant tests, images, and reports as documented above.  I formulated and edited as necessary the assessment and plan and discussed the findings and management plan with the patient and family. - Romina Fam MD

## 2023-08-03 RX ORDER — LATANOPROST 50 UG/ML
1 SOLUTION/ DROPS OPHTHALMIC AT BEDTIME
Qty: 7.5 ML | Refills: 3 | OUTPATIENT
Start: 2023-08-03

## 2023-08-13 RX ORDER — DORZOLAMIDE HYDROCHLORIDE AND TIMOLOL MALEATE 20; 5 MG/ML; MG/ML
1 SOLUTION/ DROPS OPHTHALMIC 2 TIMES DAILY
Qty: 10 ML | Refills: 7 | OUTPATIENT
Start: 2023-08-13

## 2023-09-05 ENCOUNTER — OFFICE VISIT (OUTPATIENT)
Dept: OPHTHALMOLOGY | Facility: CLINIC | Age: 23
End: 2023-09-05
Attending: OPHTHALMOLOGY
Payer: COMMERCIAL

## 2023-09-05 DIAGNOSIS — H55.01 NYSTAGMUS, CONGENITAL: ICD-10-CM

## 2023-09-05 DIAGNOSIS — Q15.0 CONGENITAL GLAUCOMA: ICD-10-CM

## 2023-09-05 DIAGNOSIS — Z98.890 POSTSURGICAL STATES FOLLOWING SURGERY OF EYE AND ADNEXA: ICD-10-CM

## 2023-09-05 DIAGNOSIS — Q13.4 PETER'S ANOMALY: Primary | ICD-10-CM

## 2023-09-05 DIAGNOSIS — H04.129 DRY EYE: ICD-10-CM

## 2023-09-05 DIAGNOSIS — H21.563 PUPIL IRREGULAR OF BOTH EYES: ICD-10-CM

## 2023-09-05 DIAGNOSIS — Z94.7 CORNEA REPLACED BY TRANSPLANT: ICD-10-CM

## 2023-09-05 DIAGNOSIS — H54.3 LOW VISION, BOTH EYES: ICD-10-CM

## 2023-09-05 PROCEDURE — 99214 OFFICE O/P EST MOD 30 MIN: CPT | Mod: GC | Performed by: OPHTHALMOLOGY

## 2023-09-05 PROCEDURE — G0463 HOSPITAL OUTPT CLINIC VISIT: HCPCS | Performed by: OPHTHALMOLOGY

## 2023-09-05 RX ORDER — FLUOROMETHOLONE 0.1 %
SUSPENSION, DROPS(FINAL DOSAGE FORM)(ML) OPHTHALMIC (EYE)
Qty: 15 ML | Refills: 11 | Status: SHIPPED | OUTPATIENT
Start: 2023-09-05 | End: 2024-01-15

## 2023-09-05 ASSESSMENT — VISUAL ACUITY
METHOD: SNELLEN - LINEAR
OS_CC: 20/70
OD_CC: CF 2'

## 2023-09-05 ASSESSMENT — REFRACTION_WEARINGRX
OS_CYLINDER: +3.00
OS_SPHERE: -6.00
OD_CYLINDER: +6.00
SPECS_TYPE: SVL
OD_AXIS: 100
OD_SPHERE: -13.50
OS_AXIS: 120

## 2023-09-05 ASSESSMENT — EXTERNAL EXAM - LEFT EYE: OS_EXAM: PROPTOSIS R>L, FAIR LID CLOSURE

## 2023-09-05 ASSESSMENT — CONF VISUAL FIELD
OS_INFERIOR_TEMPORAL_RESTRICTION: 0
OS_NORMAL: 1
OS_SUPERIOR_TEMPORAL_RESTRICTION: 0
OS_INFERIOR_NASAL_RESTRICTION: 0
OS_SUPERIOR_NASAL_RESTRICTION: 0
METHOD: COUNTING FINGERS
OD_SUPERIOR_TEMPORAL_RESTRICTION: 3
OD_SUPERIOR_NASAL_RESTRICTION: 1

## 2023-09-05 ASSESSMENT — TONOMETRY
OD_IOP_MMHG: 12
IOP_METHOD: ICARE
OS_IOP_MMHG: 9

## 2023-09-05 NOTE — PROGRESS NOTES
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     Interval Hx 09/5/23:  Now s/p PPV/GENTRY right eye 4/17/23. Doing well s/p surgery. No pain in eyes. Tolerating drops ok. No concerns today; pt is here with father.     Current Ocular Meds reviewed 05/30/23:  - Cosopt BID each eye  - xalatan at bedtime each eye  - alphagan BID left eye  - FML BID each eye    - Erythromycin ointment QID right eye    - Anthony ointment at nighttime left eye   - Artificial tears PRN each eye      Assessment and Plan:  # s/p EDTA chelation and superficial  Keratectomy right eye 9/3/20 (Dr. Salinas)     # Pressley anomaly both eyes 2000  - s/p repeat PK RE (8/18/21) (Jessica)  - S/P bilateral penetrating keratoplasty (PK) in 2000 age 12 days and 14 days   -  s/p EDTA chelation right eye (See above), with remaining scarring.  - follows with Dr. Fam,  Dr. Webster.   - Previously saw Dr. Garcia prior to PKP, though has not seen again since    4/12/22: Vision stable Surface dryness, no epi defects.  8/16/22: VA somewhat worsened, though subjective vision stability. Diffuse surface dryness   1/24/23: VA stable, diffuse surface dryness, stable  3/23/23: No epi defect.   05/30/23: No epi defect but surface appearing mildly irregular. Would increase erythromycin to 4x/day  9/5/23: VA stable. IOP wnl.      PLAN:  - continue FML BID each eye   - Continue erythromycin ointment back to 6x/day right eye   - Continue Anthony jess nightly left eye  - Continue each eye lubrication.     # Cataract, right eye  -Monitor    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  - with thin bleb, but chay neg, stable, monitor   - minimize eye rubbing     # Glaucoma, each eye   - 04/17/2023 GENTRY pars plana placement right eye    - glaucoma drops per Eleanor  - Cosopt BID each eye  - xalatan at bedtime each eye  - alphagan BID left eye    # Lagophthalmos, each eye  - clear  corneas, pt comfortable   - continue refresh at bedtime both eyes       RTC: 3 - 6 months VT    Chandler Winters MD  Resident Physician, PGY-3  Department of Ophthalmology    Attending Physician Attestation:  Complete documentation of historical and exam elements from today's encounter can be found in the full encounter summary report (not reduplicated in this progress note).  I personally obtained the chief complaint(s) and history of present illness.  I confirmed and edited as necessary the review of systems, past medical/surgical history, family history, social history, and examination findings as documented by others; and I examined the patient myself.  I personally reviewed the relevant tests, images, and reports as documented above.  I formulated and edited as necessary the assessment and plan and discussed the findings and management plan with the patient and family. - Rebel Lopez MD

## 2023-09-05 NOTE — NURSING NOTE
Chief Complaints and History of Present Illnesses   Patient presents with    Pressley anomaly     Chief Complaint(s) and History of Present Illness(es)       Pressley anomaly               Comments    Lyndsay is here to continue care for Pressley Anomaly. She states no vision change since last visit. Denies flashes floaters or eye pain.     Thiago Johnson COT 8:23 AM September 5, 2023

## 2023-09-24 DIAGNOSIS — Q13.4 PETER'S ANOMALY: ICD-10-CM

## 2023-09-26 RX ORDER — DORZOLAMIDE HYDROCHLORIDE AND TIMOLOL MALEATE 20; 5 MG/ML; MG/ML
1 SOLUTION/ DROPS OPHTHALMIC 2 TIMES DAILY
Qty: 10 ML | Refills: 2 | Status: SHIPPED | OUTPATIENT
Start: 2023-09-26 | End: 2023-12-15

## 2023-09-26 NOTE — TELEPHONE ENCOUNTER
dorzolamide 22.3 mg-timoloL 6.8 mg/mL eye drops (COSOPT)   Last Written Prescription Date:   6/1/2023  Last Fill Quantity: 10,   # refills: 2  Last Office Visit :  9/5/2023  Future Office visit:  11/21/2023      Rebel Lopez MD  Ophthalmology     PLAN:  - continue FML BID each eye   - Continue erythromycin ointment back to 6x/day right eye   - Continue Anthony jess nightly left eye  - Continue each eye lubrication.     # Cataract, right eye  -Monitor     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  - with thin bleb, but chay neg, stable, monitor   - minimize eye rubbing     # Glaucoma, each eye   - 04/17/2023 GENTRY pars plana placement right eye    - glaucoma drops per Shestaciitli  - Cosopt BID each eye  - xalatan at bedtime each eye  - alphagan BID left eye     # Lagophthalmos, each eye  - clear corneas, pt comfortable   - continue refresh at bedtime both eyes        RTC: 3 - 6 months VT     Chandler Winters MD  Resident Physician, PGY-3  Department of Ophthalmology    10 mL, 2 Refills sent to yaya Law RN  Central Triage Red Flags/Med Refills

## 2023-11-11 DIAGNOSIS — Q15.0 CONGENITAL GLAUCOMA: ICD-10-CM

## 2023-11-13 RX ORDER — LATANOPROST 50 UG/ML
1 SOLUTION/ DROPS OPHTHALMIC AT BEDTIME
Qty: 2.5 ML | Refills: 3 | Status: SHIPPED | OUTPATIENT
Start: 2023-11-13 | End: 2024-03-04

## 2023-11-21 ENCOUNTER — OFFICE VISIT (OUTPATIENT)
Dept: OPHTHALMOLOGY | Facility: CLINIC | Age: 23
End: 2023-11-21
Attending: OPHTHALMOLOGY
Payer: COMMERCIAL

## 2023-11-21 DIAGNOSIS — Q15.0 CONGENITAL GLAUCOMA: Primary | ICD-10-CM

## 2023-11-21 DIAGNOSIS — Q13.4 PETER'S ANOMALY: ICD-10-CM

## 2023-11-21 PROCEDURE — 99213 OFFICE O/P EST LOW 20 MIN: CPT | Performed by: OPHTHALMOLOGY

## 2023-11-21 PROCEDURE — 92012 INTRM OPH EXAM EST PATIENT: CPT | Performed by: OPHTHALMOLOGY

## 2023-11-21 ASSESSMENT — VISUAL ACUITY
CORRECTION_TYPE: GLASSES
OS_CC: 20/70
OS_CC+: +1
METHOD: SNELLEN - LINEAR
OD_CC: CF

## 2023-11-21 ASSESSMENT — REFRACTION_WEARINGRX
OD_AXIS: 100
OS_CYLINDER: +3.00
OS_AXIS: 120
SPECS_TYPE: SVL
OD_CYLINDER: +6.00
OD_SPHERE: -13.50
OS_SPHERE: -6.00

## 2023-11-21 ASSESSMENT — TONOMETRY
OS_IOP_MMHG: 11
IOP_METHOD: TONOPEN
IOP_METHOD: TONOPEN
OD_IOP_MMHG: 9
OD_IOP_MMHG: 16
OS_IOP_MMHG: 17

## 2023-11-21 ASSESSMENT — EXTERNAL EXAM - LEFT EYE: OS_EXAM: PROPTOSIS R>L, FAIR LID CLOSURE

## 2023-11-21 NOTE — PROGRESS NOTES
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 John Lira (at age 10 days left eye, age 12 days right eye), s/p repeat PK in the right eye 8/18/21.  - 3/14/23: bilateral EUA and right eye corneal sutures removal.    - 04/17/2023 GENTRY pars plana placement right eye     - Today, 11/21/2023, patient is here with her father. Things are going well. No pain. Getting in eyedrops without any issues.    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   Previous glaucoma surgery/laser: spontaneous bleb formation right eye post patch graft   GENTRY + PPV right eye 04/17/2023  Maximum intraocular pressure: unknown   Currently Meds: xalatan at bedtime each eye, alphagan BID each eye, and cosopt BID each eye   Gonio: unable to perform   Refractive status: Myopia  Steroid exposure: positive, topical FML BID each eye   Meds AEs/intolerance: None   PMHx: No history of Asthma and respiratory problems/Cardiac/Renal/Kidney stones/Sulfa Allergy  Prior testing:  Fundus photo (5/4/21): poor quality due to nystagmus    Optic nerve demonstrates increased cupping in the right eye compared to prior photos (03/13/23 EUA)    Testing today    IOP: good low teen's palpation, mid teens mmHg Tonopen   Healing well, tube covered now with bleb over plate    Additional Ocular History:     2. Pressley anomaly both eyes  - S/P bilateral penetrating keratoplasty (PK) as an infant (2000), with revision  - S/p EDTA chelation right eye 9/3/2020.  Over the interval, working with Dr. Garcia on scleral lens fittings.  - s/p repeat PK, right eye 8/18/21 - with edema and KNV   - Using PF  TID right eye    - Follows w/ Dr. Fam and Dr. 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 Congenital nystagmus     5. Dandy Walker Syndrome     6. Exotropia of right eye  - Stable  - Following with Dr. Fam      Plan/Recommendations:  Discussed findings with patient's parents.  Compared to the ONH photos performed by Dr. Fam during EUA on 09/03/2020 there is progressive cupping of the right optic nerve. ONH imaging from the time of EUA at the time of Dr. Reyna not visible. Therefore, we recommended GENTRY tube shunt.   Now s/p pars plana GENTRY right eye 04/17/2023 with Retina. Good bleb over the GENTRY plate. IOP good.  Starting to develop a cataract in the right eye, so far VA not changed as per patient however it is changed on the charts, will observe for now   Continue FML BID both eyes  Continue Cosopt BID both eyes  Continue Xalatan (latanoprost) at bedtime both eyes  Continue Alphagan BID left eye, could consider holding off and rechecking IOP if IOP continues to be very well controlled   Continue erythromycin ointment right eye    RTC: in 5-6 months VA, IOP         Physician Attestation     Attending Physician Attestation:  Complete documentation of historical and exam elements from today's encounter can be found in the full encounter summary report (not reduplicated in this progress note). I personally obtained the chief complaint(s) and history of present illness. I confirmed and edited as necessary the review of systems, past medical/surgical history, family history, social history, and examination findings as documented by others; and I examined the patient myself. I personally reviewed the relevant tests, images, and reports as documented above. I formulated and edited as necessary the assessment and plan and discussed the findings and management plan with the patient and any family members present at the time of the visit.  Jono Webster M.D., Glaucoma, November  21, 2023

## 2023-11-21 NOTE — NURSING NOTE
Chief Complaints and History of Present Illnesses   Patient presents with    Glaucoma Follow-Up     3-4 month follow up for Congenital Glaucoma secondary to Pressley Anomaly each eye.     Patient states vision is stable each eye in the last few months. Denies eye pain each eye. Patient states compliant with eye medications.      Chief Complaint(s) and History of Present Illness(es)       Glaucoma Follow-Up              Laterality: both eyes    Associated symptoms: Negative for eye pain, redness and tearing    Compliance with Treatment: always    Pain scale: 0/10    Comments: 3-4 month follow up for Congenital Glaucoma secondary to Pressley Anomaly each eye.     Patient states vision is stable each eye in the last few months. Denies eye pain each eye. Patient states compliant with eye medications.               Comments    Ocular meds:   - FML BID each eye   - Cosopt BID each eye   - Latanoprost at bedtime each eye  - Alphagan BID left eye   - Erythromycin jess 6x/day right eye   - Anthony jess nightly left eye    MARILUZ Pinto 8:11 AM 11/21/2023

## 2023-12-19 ENCOUNTER — OFFICE VISIT (OUTPATIENT)
Dept: OPHTHALMOLOGY | Facility: CLINIC | Age: 23
End: 2023-12-19
Attending: OPHTHALMOLOGY
Payer: COMMERCIAL

## 2023-12-19 DIAGNOSIS — H43.393 VITREOUS SYNERESIS, BILATERAL: Primary | ICD-10-CM

## 2023-12-19 DIAGNOSIS — Q15.0 CONGENITAL GLAUCOMA: ICD-10-CM

## 2023-12-19 DIAGNOSIS — Z94.7 CORNEA REPLACED BY TRANSPLANT: ICD-10-CM

## 2023-12-19 DIAGNOSIS — H54.3 LOW VISION, BOTH EYES: ICD-10-CM

## 2023-12-19 DIAGNOSIS — H55.01 NYSTAGMUS, CONGENITAL: ICD-10-CM

## 2023-12-19 DIAGNOSIS — Q13.4 PETER'S ANOMALY: ICD-10-CM

## 2023-12-19 DIAGNOSIS — H04.129 DRY EYE: ICD-10-CM

## 2023-12-19 PROCEDURE — 99213 OFFICE O/P EST LOW 20 MIN: CPT | Performed by: OPHTHALMOLOGY

## 2023-12-19 PROCEDURE — 99214 OFFICE O/P EST MOD 30 MIN: CPT | Performed by: OPHTHALMOLOGY

## 2023-12-19 ASSESSMENT — VISUAL ACUITY
OS_CC: 20/100
CORRECTION_TYPE: GLASSES
METHOD: SNELLEN - LINEAR
OD_CC: 20/400
OS_CC+: -1

## 2023-12-19 ASSESSMENT — CONF VISUAL FIELD
OS_SUPERIOR_NASAL_RESTRICTION: 0
OS_SUPERIOR_TEMPORAL_RESTRICTION: 0
OS_INFERIOR_NASAL_RESTRICTION: 0
OS_INFERIOR_TEMPORAL_RESTRICTION: 0
OD_SUPERIOR_TEMPORAL_RESTRICTION: 3
OD_SUPERIOR_NASAL_RESTRICTION: 1
OS_NORMAL: 1

## 2023-12-19 ASSESSMENT — TONOMETRY
OS_IOP_MMHG: 18
OD_IOP_MMHG: 18
IOP_METHOD: TONOPEN

## 2023-12-19 ASSESSMENT — SLIT LAMP EXAM - LIDS
COMMENTS: EXOPHTHALMOS R>L
COMMENTS: EXOPHTHALMOS R>L

## 2023-12-19 ASSESSMENT — EXTERNAL EXAM - LEFT EYE: OS_EXAM: PROPTOSIS R>L, FAIR LID CLOSURE

## 2023-12-19 NOTE — PROGRESS NOTES
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  Over the interval, working with Dr. Garcia on scleral lens fittings.  - s/p repeat PK, right eye 8/18/21 - with edema and KNV   - Follows w/ Dr. Fam and Dr. 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 Fam      Plan/Recommendations:  Discussed findings with patient's parents.  Retina looks stable and attached and stable; no need for routine follow ups with retina   Continue FML BID both eyes  Continue Cosopt BID both eyes  Continue Xalatan (latanoprost) at bedtime both eyes  Continue Alphagan BID left eye, could consider holding off and rechecking IOP if IOP continues to be very well controlled   Continue erythromycin ointment right eye    RTC: with retina if needed; with Radha Lopez and Mackenzie as scheduled         Complete documentation of historical and exam elements from today's encounter can be found in the full encounter summary report (not reduplicated in this progress note). I personally obtained the chief complaint(s) and history of present illness.  I confirmed and edited as necessary the review of systems, past medical/surgical history, family history, social history, and examination findings as documented by others; and I examined the patient myself. I personally reviewed the relevant tests, images, and reports as documented above. I  formulated and edited as necessary the assessment and plan and discussed the findings and management plan with the patient and family.    Luc Calderon MD  , Vitreoretinal surgery   Department of Ophthalmology  [facility]

## 2023-12-23 RX ORDER — BRIMONIDINE TARTRATE 1.5 MG/ML
1 SOLUTION/ DROPS OPHTHALMIC
Qty: 10 ML | Refills: 3 | OUTPATIENT
Start: 2023-12-23

## 2024-01-15 DIAGNOSIS — Z94.7 CORNEA REPLACED BY TRANSPLANT: ICD-10-CM

## 2024-01-15 RX ORDER — FLUOROMETHOLONE 0.1 %
SUSPENSION, DROPS(FINAL DOSAGE FORM)(ML) OPHTHALMIC (EYE)
Qty: 15 ML | Refills: 11 | Status: SHIPPED | OUTPATIENT
Start: 2024-01-15

## 2024-02-16 DIAGNOSIS — Z94.7 CORNEA REPLACED BY TRANSPLANT: ICD-10-CM

## 2024-02-16 DIAGNOSIS — H18.30: ICD-10-CM

## 2024-02-21 RX ORDER — FLUOROMETHOLONE 0.1 %
SUSPENSION, DROPS(FINAL DOSAGE FORM)(ML) OPHTHALMIC (EYE)
Qty: 15 ML | Refills: 11 | OUTPATIENT
Start: 2024-02-21

## 2024-02-21 RX ORDER — ERYTHROMYCIN 5 MG/G
OINTMENT OPHTHALMIC
Qty: 7 G | Refills: 10 | Status: SHIPPED | OUTPATIENT
Start: 2024-02-21 | End: 2024-07-29

## 2024-02-21 NOTE — TELEPHONE ENCOUNTER
erythromycin 5 mg/gram (0.5 %) eye ointment   Place 1/4 inch bead in right eye every two hours while awake    Last Written Prescription Date:  5/30/23  Last Fill Quantity: 7 g ,   # refills: 10       fluorometholone 0.1 % eye drops,suspension (FML)   1 drop 2 times per day in each eye      Last Written Prescription Date:  1/15/24  Last Fill Quantity: 15 ml ,   # refills: 11  [address]     Last Office Visit : 12/19/23: Plan/Recommendations:  Discussed findings with patient's parents.  Retina looks stable and attached and stable; no need for routine follow ups with retina   Continue FML BID both eyes  Continue Cosopt BID both eyes  Continue Xalatan (latanoprost) at bedtime both eyes  Continue Alphagan BID left eye, could consider holding off and rechecking IOP if IOP continues to be very well controlled   Continue erythromycin ointment right eye     RTC: with retina if needed; with Rahda Lopez and Mackenzie as scheduled   Future Office visit:  3/19/24

## 2024-03-03 DIAGNOSIS — Q15.0 CONGENITAL GLAUCOMA: ICD-10-CM

## 2024-03-04 DIAGNOSIS — Q15.0 CONGENITAL GLAUCOMA: ICD-10-CM

## 2024-03-04 RX ORDER — LATANOPROST 50 UG/ML
1 SOLUTION/ DROPS OPHTHALMIC AT BEDTIME
Qty: 2.5 ML | Refills: 3 | Status: SHIPPED | OUTPATIENT
Start: 2024-03-04 | End: 2024-07-16

## 2024-03-19 ENCOUNTER — OFFICE VISIT (OUTPATIENT)
Dept: OPHTHALMOLOGY | Facility: CLINIC | Age: 24
End: 2024-03-19
Attending: OPHTHALMOLOGY
Payer: COMMERCIAL

## 2024-03-19 DIAGNOSIS — H21.563 PUPIL IRREGULAR OF BOTH EYES: ICD-10-CM

## 2024-03-19 DIAGNOSIS — Q15.0 CONGENITAL GLAUCOMA OF RIGHT EYE: ICD-10-CM

## 2024-03-19 DIAGNOSIS — H04.129 DRY EYE: ICD-10-CM

## 2024-03-19 DIAGNOSIS — H52.213 IRREGULAR ASTIGMATISM OF BOTH EYES: ICD-10-CM

## 2024-03-19 DIAGNOSIS — Z98.890 POSTOPERATIVE EYE STATE: ICD-10-CM

## 2024-03-19 DIAGNOSIS — Z94.7 PENETRATING KERATOPLASTY GRAFT IN PLACE: ICD-10-CM

## 2024-03-19 DIAGNOSIS — Q15.0 CONGENITAL GLAUCOMA OF BOTH EYES: Primary | ICD-10-CM

## 2024-03-19 PROCEDURE — 99214 OFFICE O/P EST MOD 30 MIN: CPT | Performed by: OPHTHALMOLOGY

## 2024-03-19 PROCEDURE — 99213 OFFICE O/P EST LOW 20 MIN: CPT | Performed by: OPHTHALMOLOGY

## 2024-03-19 ASSESSMENT — TONOMETRY
IOP_METHOD: ICARE
OS_IOP_MMHG: 08
OD_IOP_MMHG: 06

## 2024-03-19 ASSESSMENT — EXTERNAL EXAM - LEFT EYE: OS_EXAM: PROPTOSIS R>L, FAIR LID CLOSURE

## 2024-03-19 ASSESSMENT — VISUAL ACUITY
OD_CC: 7/200 E
OS_CC: 20/100
CORRECTION_TYPE: GLASSES
METHOD: SNELLEN - LINEAR

## 2024-03-19 ASSESSMENT — REFRACTION_WEARINGRX
OD_AXIS: 100
OS_SPHERE: -6.00
OS_CYLINDER: +3.00
OD_SPHERE: -13.50
SPECS_TYPE: SVL
OS_AXIS: 120
OD_CYLINDER: +6.00

## 2024-03-19 ASSESSMENT — SLIT LAMP EXAM - LIDS
COMMENTS: EXOPHTHALMOS R>L
COMMENTS: EXOPHTHALMOS R>L

## 2024-03-19 ASSESSMENT — PACHYMETRY: OD_CT(UM): 657

## 2024-03-19 NOTE — NURSING NOTE
Chief Complaints and History of Present Illnesses   Patient presents with    Follow Up     6 month follow up s/p EDTA chelation and superficial  Keratectomy right eye 9/3/20 (Dr. Salinas)     Chief Complaint(s) and History of Present Illness(es)       Follow Up              Comments: 6 month follow up s/p EDTA chelation and superficial  Keratectomy right eye 9/3/20 (Dr. aSlinas)              Comments    Pt states vision is about the same as last visit. No eye pain or irritation. No redness or dryness.    Sagar Oocnnor [facility] March 19, 2024 7:10 AM

## 2024-03-19 NOTE — PROGRESS NOTES
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     Interval Hx 09/5/23:  Now s/p PPV/GENTRY right eye 4/17/23. Doing well s/p surgery. No pain in eyes. Tolerating drops ok. No concerns today; pt is here with father.     Current Ocular Meds reviewed 05/30/23:  - Cosopt BID each eye  - xalatan at bedtime each eye  - alphagan BID left eye  - FML BID each eye    - Erythromycin ointment QID right eye    - Anthony ointment at nighttime left eye   - Artificial tears PRN each eye      Assessment and Plan:  # s/p EDTA chelation and superficial  Keratectomy right eye 9/3/20 (Dr. Salinas)     # Pressley anomaly both eyes 2000  - s/p repeat PK RE (8/18/21) (Jessica)  - S/P bilateral penetrating keratoplasty (PK) in 2000 age 12 days and 14 days   -  s/p EDTA chelation right eye (See above), with remaining scarring.  - follows with Dr. Fam,  Dr. Webster.   - Previously saw Dr. Garcia prior to PKP, though has not seen again since    4/12/22: Vision stable Surface dryness, no epi defects.  8/16/22: VA somewhat worsened, though subjective vision stability. Diffuse surface dryness   1/24/23: VA stable, diffuse surface dryness, stable  3/23/23: No epi defect.   05/30/23: No epi defect but surface appearing mildly irregular. Would increase erythromycin to 4x/day  9/5/23: VA stable. IOP wnl.      PLAN:  - continue FML BID each eye   - Continue erythromycin ointment back to 6x/day right eye   - Continue Anthony jess nightly left eye  - Continue each eye lubrication.     # Cataract, right eye  -Monitor    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  - with thin bleb, but chay neg, stable, monitor   - minimize eye rubbing     # Glaucoma, each eye   - 04/17/2023 GENTRY pars plana placement right eye    - glaucoma drops per Eleanor  - Cosopt BID each eye  - xalatan at bedtime each eye  - alphagan BID left eye    # Lagophthalmos, each eye  - clear  corneas, pt comfortable   - continue refresh at bedtime both eyes    # right eye: possible early PSC - exam limited by nystagmus.       RTC: 3 - 6 months VT    Rebel Lopez MD    Attending Physician Attestation:  Complete documentation of historical and exam elements from today's encounter can be found in the full encounter summary report (not reduplicated in this progress note).  I personally obtained the chief complaint(s) and history of present illness.  I confirmed and edited as necessary the review of systems, past medical/surgical history, family history, social history, and examination findings as documented by others; and I examined the patient myself.  I personally reviewed the relevant tests, images, and reports as documented above.  I formulated and edited as necessary the assessment and plan and discussed the findings and management plan with the patient and family. - Rebel Lopez MD

## 2024-04-23 ENCOUNTER — OFFICE VISIT (OUTPATIENT)
Dept: OPHTHALMOLOGY | Facility: CLINIC | Age: 24
End: 2024-04-23
Attending: OPHTHALMOLOGY
Payer: COMMERCIAL

## 2024-04-23 DIAGNOSIS — Q15.0 CONGENITAL GLAUCOMA OF BOTH EYES: Primary | ICD-10-CM

## 2024-04-23 DIAGNOSIS — Q13.4 PETER'S ANOMALY: ICD-10-CM

## 2024-04-23 PROCEDURE — 99211 OFF/OP EST MAY X REQ PHY/QHP: CPT | Performed by: OPHTHALMOLOGY

## 2024-04-23 PROCEDURE — 99213 OFFICE O/P EST LOW 20 MIN: CPT | Performed by: OPHTHALMOLOGY

## 2024-04-23 ASSESSMENT — VISUAL ACUITY
OD_CC: 2/200E
METHOD: SNELLEN - LINEAR
OS_CC: 20/70

## 2024-04-23 ASSESSMENT — TONOMETRY
OS_IOP_MMHG: 14
OD_IOP_MMHG: 12
IOP_METHOD: TONOPEN

## 2024-04-23 ASSESSMENT — EXTERNAL EXAM - LEFT EYE: OS_EXAM: PROPTOSIS R>L, FAIR LID CLOSURE

## 2024-04-23 NOTE — PROGRESS NOTES
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 John Lira (at age 10 days left eye, age 12 days right eye), s/p repeat PK in the right eye 8/18/21.  - 3/14/23: bilateral EUA and right eye corneal sutures removal.    - 04/17/2023 GENTRY pars plana placement right eye     - Today, 04/23/2024, patient is here with her father. Things are going well. No pain. Getting in eyedrops without any issues.    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   Previous glaucoma surgery/laser: spontaneous bleb formation right eye post patch graft   GENTRY + PPV right eye 04/17/2023  Maximum intraocular pressure: unknown   Currently Meds: xalatan at bedtime each eye, alphagan BID each eye, and cosopt BID each eye   Gonio: unable to perform   Refractive status: Myopia  Steroid exposure: positive, topical FML BID each eye   Meds AEs/intolerance: None   PMHx: No history of Asthma and respiratory problems/Cardiac/Renal/Kidney stones/Sulfa Allergy  Prior testing:  Fundus photo (5/4/21): poor quality due to nystagmus    Optic nerve demonstrates increased cupping in the right eye compared to prior photos (03/13/23 EUA)    Testing today    IOP: good low teen's palpation, mid teens mmHg Tonopen   tube covered with bleb over plate    Additional Ocular History:     2. Pressley anomaly both eyes  - S/P bilateral penetrating keratoplasty (PK) as an infant (2000), with revision  - S/p EDTA chelation right eye 9/3/2020.  Over the interval, working with Dr. Garcia on scleral lens fittings.  - s/p repeat PK, right eye 8/18/21 - with edema and KNV   - Using PF TID right eye    -  Follows w/ Dr. Fam and Dr. 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 Congenital nystagmus     5. Dandy Walker Syndrome     6. Exotropia of right eye  - Stable  - Following with Dr. Fam      Plan/Recommendations:  Discussed findings with patient's parents.  Compared to the ONH photos performed by Dr. Fam during EUA on 09/03/2020 there is progressive cupping of the right optic nerve. ONH imaging from the time of EUA at the time of Dr. Reyna not visible. Therefore, we recommended GENTRY tube shunt.   Now s/p pars plana GENTRY right eye 04/17/2023 with Retina. Good bleb over the GENTRY plate. IOP good.  Starting to develop a cataract in the right eye, so far VA not changed as per patient however it is changed on the charts, will observe for now, follow up with Dr. Briceno on possible need for cataract surgery in the future   Continue FML BID both eyes  Continue Cosopt BID both eyes  Continue Xalatan (latanoprost) at bedtime both eyes  Continue Alphagan BID left eye, could consider holding off and rechecking IOP if IOP continues to be very well controlled   Continue erythromycin ointment right eye    RTC: in 6 months VA, IOP       Physician Attestation     Attending Physician Attestation:  Complete documentation of historical and exam elements from today's encounter can be found in the full encounter summary report (not reduplicated in this progress note). I personally obtained the chief complaint(s) and history of present illness. I confirmed and edited as necessary the review of systems, past medical/surgical history, family history, social history, and examination findings as documented by others; and I examined the patient myself. I personally reviewed the relevant tests, images, and reports as documented above. I formulated and edited as necessary the assessment and plan and discussed the findings and management plan with the patient and any family members present at the  time of the visit.  Jono Webster M.D., Glaucoma, April 23, 2024

## 2024-04-23 NOTE — NURSING NOTE
Chief Complaints and History of Present Illnesses   Patient presents with    Follow Up     Chief Complaint(s) and History of Present Illness(es)       Follow Up              Laterality: both eyes    Course: stable    Associated symptoms: Negative for eye pain, flashes and floaters    Treatments tried: eye drops              Comments    Here for follow up. VA is about the same. Compliant with drops. No eye pain. No flashes or floaters.    Keven MCGRAW 8:07 AM April 23, 2024

## 2024-06-01 RX ORDER — LATANOPROST 50 UG/ML
1 SOLUTION/ DROPS OPHTHALMIC AT BEDTIME
Qty: 2.5 ML | Refills: 1 | OUTPATIENT
Start: 2024-06-01

## 2024-06-10 ENCOUNTER — TELEPHONE (OUTPATIENT)
Dept: OPHTHALMOLOGY | Facility: CLINIC | Age: 24
End: 2024-06-10
Payer: MEDICARE

## 2024-06-16 ENCOUNTER — HEALTH MAINTENANCE LETTER (OUTPATIENT)
Age: 24
End: 2024-06-16

## 2024-07-12 DIAGNOSIS — Q15.0 CONGENITAL GLAUCOMA: ICD-10-CM

## 2024-07-29 ENCOUNTER — OFFICE VISIT (OUTPATIENT)
Dept: OPHTHALMOLOGY | Facility: CLINIC | Age: 24
End: 2024-07-29
Attending: OPHTHALMOLOGY
Payer: COMMERCIAL

## 2024-07-29 DIAGNOSIS — Q13.4 PETER'S ANOMALY: ICD-10-CM

## 2024-07-29 DIAGNOSIS — Q15.0 CONGENITAL GLAUCOMA OF BOTH EYES: Primary | ICD-10-CM

## 2024-07-29 DIAGNOSIS — H54.414A CATEGORY 4 BLINDNESS OF RIGHT EYE WITH NORMAL VISION OF LEFT EYE: ICD-10-CM

## 2024-07-29 DIAGNOSIS — H21.563 PUPIL IRREGULAR OF BOTH EYES: ICD-10-CM

## 2024-07-29 DIAGNOSIS — Z94.7 PENETRATING KERATOPLASTY GRAFT IN PLACE: ICD-10-CM

## 2024-07-29 DIAGNOSIS — H55.01 NYSTAGMUS, CONGENITAL: ICD-10-CM

## 2024-07-29 PROCEDURE — 92015 DETERMINE REFRACTIVE STATE: CPT | Performed by: TECHNICIAN/TECHNOLOGIST

## 2024-07-29 PROCEDURE — 99214 OFFICE O/P EST MOD 30 MIN: CPT | Performed by: OPHTHALMOLOGY

## 2024-07-29 RX ORDER — ERYTHROMYCIN 5 MG/G
OINTMENT OPHTHALMIC
Qty: 7 G | Refills: 10 | Status: SHIPPED | OUTPATIENT
Start: 2024-07-29

## 2024-07-29 ASSESSMENT — REFRACTION
OS_CYLINDER: +3.00
OS_SPHERE: -6.50
OS_AXIS: 110
OD_AXIS: 090
OS_SPHERE: -6.00
OD_SPHERE: -14.00
OS_AXIS: 120
OS_CYLINDER: +3.00
OD_CYLINDER: +6.00

## 2024-07-29 ASSESSMENT — VISUAL ACUITY
OS_CC: 20/70
CORRECTION_TYPE: GLASSES
OS_CC+: -1

## 2024-07-29 ASSESSMENT — CONF VISUAL FIELD
OS_INFERIOR_TEMPORAL_RESTRICTION: 0
OD_NORMAL: 1
OD_SUPERIOR_TEMPORAL_RESTRICTION: 0
OS_NORMAL: 1
OD_INFERIOR_TEMPORAL_RESTRICTION: 0
OS_INFERIOR_NASAL_RESTRICTION: 0
OS_SUPERIOR_TEMPORAL_RESTRICTION: 0
OD_INFERIOR_NASAL_RESTRICTION: 0
OS_SUPERIOR_NASAL_RESTRICTION: 0
OD_SUPERIOR_NASAL_RESTRICTION: 0
METHOD: TOYS

## 2024-07-29 ASSESSMENT — TONOMETRY
IOP_METHOD: ICARE
OD_IOP_MMHG: 7
OS_IOP_MMHG: 9

## 2024-07-29 ASSESSMENT — REFRACTION_WEARINGRX
OD_CYLINDER: +5.75
OS_CYLINDER: +2.75
OS_AXIS: 124
OD_AXIS: 101
OS_SPHERE: -5.75
OD_SPHERE: -13.50

## 2024-07-29 ASSESSMENT — PACHYMETRY: OD_CT(UM): 657

## 2024-07-29 ASSESSMENT — EXTERNAL EXAM - LEFT EYE: OS_EXAM: PROPTOSIS R>L, FAIR LID CLOSURE

## 2024-07-29 NOTE — PATIENT INSTRUCTIONS
Get new glasses and wear full time (100% of waking hours).     Continue present management with all drops.     Return to clinic sooner for any worsening vision or new concerns.

## 2024-07-29 NOTE — LETTER
7/29/2024       RE: Ameena Solorzano  [address]  [place] [address]     Dear Colleague,    Thank you for referring your patient, Ameena Solorzano, to the [facility] at [facility]. Please see a copy of my visit note below.    Chief Complaint(s) and History of Present Illness(es)       Glaucoma Follow Up    Associated symptoms include Negative for dryness, eye pain and redness. Additional comments: Pt says her eyes feel normal. Pt has light sensitivity. No tearing or redness. Pt says there is no change in her vision. Dad does not notice strabismus. Pt WGFT. Pt got new rx at [facility] this past year. Left eye Anthony tablet at bedtime, Cosopt BID both eyes (6:30am), latanoprost bedtime both eyes (10pm) alphagan left eye BID (6:30am) erythromycin ointment QID right eye, FML BID both eyes (6:30am) with good compliance.                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  Over the interval, working with Dr. Garcia on scleral lens fittings.  - s/p repeat PK, right eye 8/18/21 - with edema and KNV   - Follows w/ Dr. Fam and Dr. Lopez  - continue FML BI ou      # Lagophthalmos at night  - continue refresh at bedtime both eyes     # Congenital nystagmus     # Dandy Walker Syndrome     # Exotropia of right eye  - Stable  - Following with  Dr. Fam      Plan/Recommendations:    Discussed findings with patient's parents.    Retina looks stable and attached and stable; no need for routine follow ups with retina     Continue FML BID both eyes    Continue Cosopt BID both eyes    Continue Xalatan (latanoprost) at bedtime both eyes    Continue Alphagan BID left eye, could consider holding off and rechecking IOP if IOP continues to be very well controlled     Continue erythromycin ointment right eye    RTC: with retina if needed; with Radha Lpoez and Mackenzie as scheduled                      Review of systems for the eyes was negative other than the pertinent positives and negatives noted in the HPI.    History is obtained from father and patient.     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     Great intraocular pressure on current medications. Her right cataract has become more visually significant and greatly limits the dilated fundus exam view today. Anterior segment exam is stable each eye and dilated fundus exam showed glimpses of a pink, flat left nerve. Mild shift in refractive error left eye improves visual acuity to 20/50+ while the right eye is reduced at 5/300.     - Updated glasses prescription provided. Get new glasses and wear full time (100% of waking hours).   - Continue present management and follow up with Rita Wilkins, and Yumiko as planned.   - Message to her adult ophthalmology specialists reguarding the potential need for cataract surgery as reviewed with her father.  - Recommend follow up for repeat cycloplegic refraction in 1 year. While Lyndsay is now an adult she requires  moderate assist (50% patients effort) cycloplegic refraction for her glasses prescription. She can have this done with her adult ophthalmology team or with our team if they prefer. Put down for 1 year follow up with us for this.        Return in about 1 year (around 7/29/2025) for Vision & alignment, CRx & Dilated Exam.    Patient Instructions   Get new glasses and wear full time (100% of waking hours).     Continue present management with all drops.     Return to clinic sooner for any worsening vision or new concerns.    Visit Diagnoses & Orders    ICD-10-CM    1. Congenital glaucoma of both eyes  Q15.0       2. Peter's anomaly  Q13.4       3. Penetrating keratoplasty graft in place - Both Eyes  Z94.7       4. Pupil irregular of both eyes  H21.563       5. Nystagmus, congenital  H55.01       6. 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  I personally obtained the chief complaint(s) and history of present illness.  I confirmed and edited as necessary the review of systems, past medical/surgical history, family history, social history, and examination findings as documented by others; and I examined the patient myself.  I personally reviewed the relevant tests, images, and reports as documented above.  I formulated and edited as necessary the assessment and plan and discussed the findings and management plan with the patient and family. - Romina Fam MD      Again, thank you for allowing me to participate in the care of your patient.      Sincerely,    Romina Fam MD

## 2024-07-29 NOTE — PROGRESS NOTES
Chief Complaint(s) and History of Present Illness(es)       Glaucoma Follow Up    Associated symptoms include Negative for dryness, eye pain and redness. Additional comments: Pt says her eyes feel normal. Pt has light sensitivity. No tearing or redness. Pt says there is no change in her vision. Dad does not notice strabismus. Pt WGFT. Pt got new rx at [facility] this past year. Left eye Anthony tablet at bedtime, Cosopt BID both eyes (6:30am), latanoprost bedtime both eyes (10pm) alphagan left eye BID (6:30am) erythromycin ointment QID right eye, FML BID both eyes (6:30am) with good compliance.                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  Over the interval, working with Dr. Garcia on scleral lens fittings.  - s/p repeat PK, right eye 8/18/21 - with edema and KNV   - Follows w/ Dr. Fam and Dr. 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 Fam      Plan/Recommendations:    Discussed findings with patient's parents.    Retina looks stable and attached and stable; no need for routine follow ups with retina     Continue FML BID both eyes    Continue Cosopt BID both eyes    Continue Xalatan (latanoprost) at bedtime both eyes    Continue Alphagan  BID left eye, could consider holding off and rechecking IOP if IOP continues to be very well controlled     Continue erythromycin ointment right eye    RTC: with retina if needed; with Radha Lopez and Mackenzie as scheduled                      Review of systems for the eyes was negative other than the pertinent positives and negatives noted in the HPI.    History is obtained from father and patient.     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 Webster, Jessica (transitioning to Rita), and Yumiko.     Great intraocular pressure on current medications. Her right cataract has become more visually significant and greatly limits the dilated fundus exam view today. Anterior segment exam is stable each eye and dilated fundus exam showed glimpses of a pink, flat left nerve. Mild shift in refractive error left eye improves visual acuity to 20/50+ while the right eye is reduced at 5/300.     - Updated glasses prescription provided. Get new glasses and wear full time (100% of waking hours).   - Continue present management and follow up with Dr. Webster, Rita, and Yumiko as planned.   - Message to her adult ophthalmology specialists reguarding the potential need for cataract surgery as reviewed with her father.  - Recommend follow up for repeat cycloplegic refraction in 1 year. While Lyndsay is now an adult she requires cycloplegic refraction for her glasses prescription. She can have this done with her adult ophthalmology team or with our team if they prefer. Put down for 1 year follow up with us for this.        Return in about 1 year (around 7/29/2025) for Vision & alignment, CRx & Dilated Exam.    Patient Instructions   Get new  glasses and wear full time (100% of waking hours).     Continue present management with all drops.     Return to clinic sooner for any worsening vision or new concerns.    Visit Diagnoses & Orders    ICD-10-CM    1. Congenital glaucoma of both eyes  Q15.0       2. Peter's anomaly  Q13.4       3. Penetrating keratoplasty graft in place - Both Eyes  Z94.7       4. Pupil irregular of both eyes  H21.563       5. Nystagmus, congenital  H55.01       6. 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  I personally obtained the chief complaint(s) and history of present illness.  I confirmed and edited as necessary the review of systems, past medical/surgical history, family history, social history, and examination findings as documented by others; and I examined the patient myself.  I personally reviewed the relevant tests, images, and reports as documented above.  I formulated and edited as necessary the assessment and plan and discussed the findings and management plan with the patient and family. - Romina Fam MD

## 2024-07-29 NOTE — NURSING NOTE
Chief Complaint(s) and History of Present Illness(es)       Glaucoma Follow Up              Associated symptoms: Negative for dryness, eye pain and redness    Comments: Pt says her eyes feel normal. Pt has light sensitivity. No tearing or redness. Pt says there is no change in her vision. Dad does not notice strabismus. Pt WGFT. Pt got new rx at [facility] this past year. Left eye Anthony tablet at bedtime, Cosopt BID both eyes (6:30am), latanoprost bedtime both eyes (10pm) alphagan left eye BID (6:30am) erythromycin ointment QID right eye, FML BID both eyes (6:30am) with good compliance.                 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  Over the interval, working with Dr. Garcia on scleral lens fittings.  - s/p repeat PK, right eye 8/18/21 - with edema and KNV   - Follows w/ Dr. Fam and Dr. 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 Fam      Plan/Recommendations:    Discussed findings with patient's parents.    Retina looks stable and attached and stable; no need for routine follow ups with retina     Continue FML BID both eyes    Continue Cosopt BID both eyes    Continue Xalatan (latanoprost) at bedtime both eyes    Continue Alphagan BID  left eye, could consider holding off and rechecking IOP if IOP continues to be very well controlled     Continue erythromycin ointment right eye    RTC: with retina if needed; with Radha Lopez and Mackenzie as scheduled

## 2024-08-01 ASSESSMENT — EXTERNAL EXAM - RIGHT EYE: OD_EXAM: PROPTOSIS R>L, FAIR LID CLOSURE

## 2024-08-22 ENCOUNTER — TELEPHONE (OUTPATIENT)
Dept: OPHTHALMOLOGY | Facility: CLINIC | Age: 24
End: 2024-08-22
Payer: MEDICARE

## 2024-08-25 ENCOUNTER — HEALTH MAINTENANCE LETTER (OUTPATIENT)
Age: 24
End: 2024-08-25

## 2024-08-28 ENCOUNTER — TELEPHONE (OUTPATIENT)
Dept: OPHTHALMOLOGY | Facility: CLINIC | Age: 24
End: 2024-08-28
Payer: MEDICARE

## 2024-08-29 DIAGNOSIS — Q13.4 PETER'S ANOMALY: ICD-10-CM

## 2024-08-30 RX ORDER — DORZOLAMIDE HYDROCHLORIDE AND TIMOLOL MALEATE 20; 5 MG/ML; MG/ML
1 SOLUTION/ DROPS OPHTHALMIC 2 TIMES DAILY
Qty: 10 ML | Refills: 1 | Status: SHIPPED | OUTPATIENT
Start: 2024-08-30

## 2024-08-30 NOTE — TELEPHONE ENCOUNTER
LCV:4/23/2024  [facility], Jono Webster MD  last clinic note:Continue Cosopt BID both eyes  RTC: in 6 months [facility], IOP

## 2024-09-30 ENCOUNTER — OFFICE VISIT (OUTPATIENT)
Dept: OPHTHALMOLOGY | Facility: CLINIC | Age: 24
End: 2024-09-30
Attending: OPHTHALMOLOGY
Payer: COMMERCIAL

## 2024-09-30 DIAGNOSIS — Z94.7 PENETRATING KERATOPLASTY GRAFT IN PLACE: ICD-10-CM

## 2024-09-30 DIAGNOSIS — H52.213 IRREGULAR ASTIGMATISM OF BOTH EYES: ICD-10-CM

## 2024-09-30 DIAGNOSIS — Q13.4 PETER'S ANOMALY: Primary | ICD-10-CM

## 2024-09-30 PROCEDURE — 99214 OFFICE O/P EST MOD 30 MIN: CPT | Mod: GC | Performed by: OPHTHALMOLOGY

## 2024-09-30 PROCEDURE — 99213 OFFICE O/P EST LOW 20 MIN: CPT | Performed by: OPHTHALMOLOGY

## 2024-09-30 ASSESSMENT — EXTERNAL EXAM - RIGHT EYE: OD_EXAM: PROPTOSIS R>L, FAIR LID CLOSURE

## 2024-09-30 ASSESSMENT — VISUAL ACUITY
METHOD: SNELLEN - LINEAR
OS_CC: 20/70
OD_CC: 200E@4'
OS_CC+: -2

## 2024-09-30 ASSESSMENT — TONOMETRY
OS_IOP_MMHG: 8
OD_IOP_MMHG: 7
IOP_METHOD: TONOPEN

## 2024-09-30 ASSESSMENT — EXTERNAL EXAM - LEFT EYE: OS_EXAM: PROPTOSIS R>L, FAIR LID CLOSURE

## 2024-09-30 NOTE — NURSING NOTE
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  No pain.  No flashes/floaters.  Pt is compliant with drops.    MARILUZ Booker September 30, 2024 7:24 AM

## 2024-09-30 NOTE — PROGRESS NOTES
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     Interval Hx 09/30/24:  Doing well s/p surgery. No pain in eyes. Tolerating drops ok. No concerns today; pt is here with father.     Chief Complaint(s) and History of Present Illness(es)       Cornea Transplant Follow Up    In both eyes.  Associated symptoms include Negative for dryness, eye pain, tearing, flashes and floaters.  Treatments tried include eye drops.  Pain was noted as 0/10. 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  No pain.  No flashes/floaters.  Pt is compliant with drops.    MARILUZ Booker September 30, 2024 7:24 AM          Current Ocular Meds reviewed 09/30/24:  - Cosopt BID each eye  - xalatan at bedtime each eye  - alphagan BID left eye  - FML BID each eye    - Erythromycin ointment QID right eye    - Anthony ointment at nighttime left eye   - Artificial tears PRN each eye        Assessment and Plan:  # s/p EDTA chelation and superficial  Keratectomy right eye 9/3/20 (Dr. Salinas)     # Pressley anomaly both eyes 2000  - s/p repeat PK RE (8/18/21) (Jessica),   - s/p PPV/GENTRY right eye 4/17/23  - S/p bilateral penetrating keratoplasty (PK) in 2000 age 12 days and 14 days   - s/p EDTA chelation right eye (See above), with remaining scarring.  - follows with Dr. Fam,  Dr. Webster.   - Previously saw Dr. Garcia prior to PKP, though has not seen again since    4/12/22: Vision stable Surface dryness, no epi defects.  8/16/22: VA somewhat worsened, though subjective vision stability. Diffuse surface dryness   1/24/23: VA stable, diffuse surface dryness, stable  3/23/23: No epi defect.   05/30/23: No epi defect but surface appearing mildly irregular. Would increase erythromycin to 4x/day  9/5/23: VA stable. IOP wnl.   09/30/24: VA stable IOP normal, no concerns      PLAN:  - continue FML BID each eye   - Continue  erythromycin ointment back to 6x/day right eye   - Continue Anthony jess nightly left eye  - Continue each eye lubrication.     # Cataract, right eye  - unclear if visually significant - discussed R/B/A of cataract surgery  - would defer for now  - would consider pars plana lensectomy in the future +/- scleral fixated lens OD    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  - with thin bleb, but chay neg, stable, monitor   - minimize eye rubbing     # Glaucoma, each eye   - 04/17/2023 GENTRY pars plana placement right eye    - glaucoma drops per Sheheitli  - Cosopt BID each eye  - xalatan at bedtime each eye  - alphagan BID left eye    # Lagophthalmos, each eye  - clear corneas, pt comfortable   - continue refresh at bedtime both eyes    # right eye: possible early PSC - exam limited by nystagmus.       RTC: 6 months [facility]    Fili Montalvo MD  Resident Physician, PGY-3  Department of Ophthalmology     Attending Physician Attestation:  Complete documentation of historical and exam elements from today's encounter can be found in the full encounter summary report (not reduplicated in this progress note).  I personally obtained the chief complaint(s) and history of present illness.  I confirmed and edited as necessary the review of systems, past medical/surgical history, family history, social history, and examination findings as documented by others; and I examined the patient myself.  I personally reviewed the relevant tests, images, and reports as documented above.  I formulated and edited as necessary the assessment and plan and discussed the findings and management plan with the patient and family. - Carter Salinas MD

## 2024-10-10 RX ORDER — LATANOPROST 50 UG/ML
1 SOLUTION/ DROPS OPHTHALMIC AT BEDTIME
Qty: 2.5 ML | Refills: 3 | OUTPATIENT
Start: 2024-10-10

## 2024-10-14 DIAGNOSIS — Q13.4 PETER'S ANOMALY: ICD-10-CM

## 2024-11-16 DIAGNOSIS — Q13.4 PETER'S ANOMALY: ICD-10-CM

## 2024-11-17 RX ORDER — DORZOLAMIDE HYDROCHLORIDE AND TIMOLOL MALEATE 20; 5 MG/ML; MG/ML
1 SOLUTION/ DROPS OPHTHALMIC 2 TIMES DAILY
Qty: 10 ML | Refills: 3 | Status: SHIPPED | OUTPATIENT
Start: 2024-11-17

## 2024-11-17 NOTE — TELEPHONE ENCOUNTER
"dorzolamide-timolol (COSOPT) 2-0.5 % ophthalmic solution   Disp-10 mL, R-1   Start: 08/30/2024 9/30/2024  [facility]    Carter Salinas MD  Ophthalmology    \" Cosopt BID each eye \"          "

## 2024-12-05 ENCOUNTER — OFFICE VISIT (OUTPATIENT)
Dept: OPHTHALMOLOGY | Facility: CLINIC | Age: 24
End: 2024-12-05
Attending: OPHTHALMOLOGY
Payer: COMMERCIAL

## 2024-12-05 DIAGNOSIS — Q15.0 CONGENITAL GLAUCOMA OF RIGHT EYE: ICD-10-CM

## 2024-12-05 DIAGNOSIS — Z94.7 PENETRATING KERATOPLASTY GRAFT IN PLACE: Primary | ICD-10-CM

## 2024-12-05 DIAGNOSIS — Q13.4 PETER'S ANOMALY: ICD-10-CM

## 2024-12-05 ASSESSMENT — TONOMETRY
OS_IOP_MMHG: 13
IOP_METHOD: TONOPEN
OD_IOP_MMHG: 10
OD_IOP_MMHG: 17
OD_IOP_MMHG: 19
OS_IOP_MMHG: 14
IOP_METHOD: ICARE
OD_IOP_MMHG: 21
IOP_METHOD: TONOPEN
IOP_METHOD: TONOPEN

## 2024-12-05 ASSESSMENT — VISUAL ACUITY
OS_CC+: +2
METHOD: SNELLEN - LINEAR
OS_CC: 20/60
CORRECTION_TYPE: GLASSES
OD_CC: 20/600

## 2024-12-05 ASSESSMENT — EXTERNAL EXAM - LEFT EYE: OS_EXAM: PROPTOSIS R>L, FAIR LID CLOSURE

## 2024-12-05 ASSESSMENT — EXTERNAL EXAM - RIGHT EYE: OD_EXAM: PROPTOSIS R>L, FAIR LID CLOSURE

## 2024-12-05 NOTE — PATIENT INSTRUCTIONS
- Alphagan (brimonidine) which is a purple top drop - apply 1 drop 2 times a day each eye   - Cosopt (timolol and dorzolamide) which is a blue top drop - apply 1 drop two times a day each eye   - Xalatan (latanoprost) which is a green top drop - apply 1 drop at bedtime each eye     Keep all other drops the same

## 2024-12-05 NOTE — NURSING NOTE
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     Patient notes vision is stable each eye over the last several months. Denies eye pain. Denies redness or tearing each eye.      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     Patient notes vision is stable each eye over the last several months. Denies eye pain. Denies redness or tearing each eye.               Comments    Ocular meds:     Continue FML BID both eyes     Cosopt BID both eyes    Xalatan (latanoprost) at bedtime both eyes     Alphagan BID left eye      erythromycin ointment right eye  FML BID each eye   Anthony jess at bedtime left eye   AT PRN     Alina Ribeiro, COT 2:23 PM 12/05/2024

## 2024-12-05 NOTE — PROGRESS NOTES
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 John Lira (at age 10 days left eye, age 12 days right eye), s/p repeat PK in the right eye 8/18/21.  - 3/14/23: bilateral EUA and right eye corneal sutures removal.    - 04/17/2023 GENTRY pars plana placement right eye     - Today, 12/05/2024, patient is here with her father. Things are going well. No pain. Getting in eyedrops without any issues.    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   Previous glaucoma surgery/laser: spontaneous bleb formation right eye post patch graft   GENTRY + PPV right eye 04/17/2023  Maximum intraocular pressure: unknown   Currently Meds: xalatan at bedtime each eye, alphagan BID each eye, and cosopt BID each eye   Gonio: unable to perform   Refractive status: Myopia  Steroid exposure: positive, topical FML BID each eye   Meds AEs/intolerance: None   PMHx: No history of Asthma and respiratory problems/Cardiac/Renal/Kidney stones/Sulfa Allergy  Prior testing:  Fundus photo (5/4/21): poor quality due to nystagmus    Optic nerve demonstrates increased cupping in the right eye compared to prior photos (03/13/23 EUA)    Testing today   tube covered with bleb over plate    Additional Ocular History:     2. Pressley anomaly both eyes  - S/P bilateral penetrating keratoplasty (PK) as an infant (2000), with revision  - S/p EDTA chelation right eye 9/3/2020.  Over the interval, working with Dr. Garcia on scleral lens fittings.  - s/p repeat PK, right eye 8/18/21 - with edema and KNV   - Using PF TID right eye    - Follows w/ Dr. Fam and Dr. Lopez     3. Lagophthalmos  at night  - continue refresh at bedtime both eyes     4. Congenital nystagmus     5. Dandy Walker Syndrome     6. Exotropia of right eye  - Stable  - Following with Dr. Fam      Plan/Recommendations:  Discussed findings with patient's parents.  Compared to the ONH photos performed by Dr. Fam during EUA on 09/03/2020 there is progressive cupping of the right optic nerve. ONH imaging from the time of EUA at the time of Dr. Reyna not visible. Therefore, we recommended GENTRY tube shunt.   Now s/p pars plana GENTRY right eye 04/17/2023 with Retina. Good bleb over the GENTRY plate. IOP good.  Starting to develop a cataract in the right eye, will follow up with Dr. Salinas   Continue FML BID both eyes  Continue Cosopt BID both eyes  Continue Xalatan (latanoprost) at bedtime both eyes  Continue Alphagan BID left eye  Start Alphagan BID right eye, IOP borderline today  Continue erythromycin ointment right eye    RTC: in 3 months [facility], IOP       Physician Attestation     Attending Physician Attestation:  Complete documentation of historical and exam elements from today's encounter can be found in the full encounter summary report (not reduplicated in this progress note). I personally obtained the chief complaint(s) and history of present illness. I confirmed and edited as necessary the review of systems, past medical/surgical history, family history, social history, and examination findings as documented by others; and I examined the patient myself. I personally reviewed the relevant tests, images, and reports as documented above. I formulated and edited as necessary the assessment and plan and discussed the findings and management plan with the patient and any family members present at the time of the visit.  Jono Webster M.D., Glaucoma, December 5, 2024

## 2025-01-12 RX ORDER — BRIMONIDINE TARTRATE 1.5 MG/ML
1 SOLUTION/ DROPS OPHTHALMIC 2 TIMES DAILY
Qty: 10 ML | Refills: 5 | OUTPATIENT
Start: 2025-01-12

## 2025-03-03 DIAGNOSIS — Z94.7 CORNEA REPLACED BY TRANSPLANT: ICD-10-CM

## 2025-03-04 NOTE — TELEPHONE ENCOUNTER
"Last Written Prescription:     fluorometholone (FML LIQUIFILM) 0.1 % ophthalmic suspension 15 mL 11 1/15/2024 -- No   Sig: Currently:place 1 drop into each eye 2 times daily     ----------------------  Last Visit Date: 12/5/24 Eleanor \" Continue FML BID both eyes \"  Future Visit Date: 3/13/25 Eleanor  ----------------------         Refill decision: Medication unable to be refilled by RN due to: Medication not included in refill protocol policy  fluorometholone (FML LIQUIFILM) 0.1 % ophthalmic suspension         Request from pharmacy:  Requested Prescriptions   Pending Prescriptions Disp Refills    fluorometholone (FML LIQUIFILM) 0.1 % ophthalmic suspension 15 mL 11     Sig: Currently:place 1 drop into each eye 2 times daily       There is no refill protocol information for this order            "

## 2025-03-05 RX ORDER — FLUOROMETHOLONE 1 MG/ML
SUSPENSION/ DROPS OPHTHALMIC
Qty: 15 ML | Refills: 11 | Status: SHIPPED | OUTPATIENT
Start: 2025-03-05

## 2025-03-13 ENCOUNTER — OFFICE VISIT (OUTPATIENT)
Dept: OPHTHALMOLOGY | Facility: CLINIC | Age: 25
End: 2025-03-13
Attending: OPHTHALMOLOGY
Payer: MEDICAID

## 2025-03-13 DIAGNOSIS — Q15.0 CONGENITAL GLAUCOMA: Primary | ICD-10-CM

## 2025-03-13 PROCEDURE — G0463 HOSPITAL OUTPT CLINIC VISIT: HCPCS | Performed by: OPHTHALMOLOGY

## 2025-03-13 ASSESSMENT — VISUAL ACUITY
METHOD: SNELLEN - LINEAR
OD_SC: 6/200E
OS_SC+: -2
OS_SC: 20/60

## 2025-03-13 ASSESSMENT — EXTERNAL EXAM - LEFT EYE: OS_EXAM: PROPTOSIS R>L, FAIR LID CLOSURE

## 2025-03-13 ASSESSMENT — TONOMETRY
IOP_METHOD: TONOPEN
OS_IOP_MMHG: 9
OD_IOP_MMHG: 11

## 2025-03-13 ASSESSMENT — EXTERNAL EXAM - RIGHT EYE: OD_EXAM: PROPTOSIS R>L, FAIR LID CLOSURE

## 2025-03-13 NOTE — PROGRESS NOTES
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 John Lira (at age 10 days left eye, age 12 days right eye), s/p repeat PK in the right eye 8/18/21.  - 3/14/23: bilateral EUA and right eye corneal sutures removal.    - 04/17/2023 GENTRY pars plana placement right eye     - Today, 03/13/2025, patient is here with her father. Things are going well. No pain. Getting in eyedrops without any issues.    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, indeterminate stage each eye   Previous glaucoma surgery/laser: spontaneous bleb formation right eye post patch graft   GENTRY + PPV right eye 04/17/2023  Maximum intraocular pressure: unknown   Currently Meds: xalatan at bedtime each eye, alphagan BID each eye, and cosopt BID each eye   Gonio: unable to perform   Refractive status: Myopia  Steroid exposure: positive, topical FML BID each eye   Meds AEs/intolerance: None   PMHx: No history of Asthma and respiratory problems/Cardiac/Renal/Kidney stones/Sulfa Allergy  Prior testing:  Fundus photo (5/4/21): poor quality due to nystagmus    Optic nerve demonstrates increased cupping in the right eye compared to prior photos (03/13/23 EUA)    Testing today   tube covered with bleb over plate    Additional Ocular History:     2. Pressley anomaly both eyes  - S/P bilateral penetrating keratoplasty (PK) as an infant (2000), with revision  - S/p EDTA chelation right eye 9/3/2020.  Over the interval, working with Dr. Garcia on scleral lens fittings.  - s/p repeat PK, right eye 8/18/21 - with edema and KNV   - Using PF TID right eye    - Follows w/ Dr. Fam and  Dr. 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 Congenital nystagmus     5. Dandy Walker Syndrome     6. Exotropia of right eye  - Stable  - Following with Dr. Fam      Plan/Recommendations:  Discussed findings with patient's parents.  Compared to the ONH photos performed by Dr. Fam during EUA on 09/03/2020 there is progressive cupping of the right optic nerve. ONH imaging from the time of EUA at the time of Dr. Reyna not visible. Therefore, we recommended GENTRY tube shunt.   Now s/p pars plana GENTRY right eye 04/17/2023 with Retina. Good bleb over the GENTRY plate. IOP good.  Starting to develop a cataract in the right eye, will follow up with Dr. Salinas   Continue FML BID both eyes  Continue Cosopt BID both eyes  Continue Xalatan (latanoprost) at bedtime both eyes  Continue Alphagan BID each eye   Continue erythromycin ointment right eye  Recommend repeat EUA with imaging after cataract surgery, view to the fundus is hazy now through cataract right eye     RTC: in 5 months [facility], IOP       Physician Attestation     Attending Physician Attestation:  Complete documentation of historical and exam elements from today's encounter can be found in the full encounter summary report (not reduplicated in this progress note). I personally obtained the chief complaint(s) and history of present illness. I confirmed and edited as necessary the review of systems, past medical/surgical history, family history, social history, and examination findings as documented by others; and I examined the patient myself. I personally reviewed the relevant tests, images, and reports as documented above. I formulated and edited as necessary the assessment and plan and discussed the findings and management plan with the patient and any family members present at the time of the visit.  Jono Webster M.D., Glaucoma, March 13, 2025

## 2025-03-31 ENCOUNTER — OFFICE VISIT (OUTPATIENT)
Dept: OPHTHALMOLOGY | Facility: CLINIC | Age: 25
End: 2025-03-31
Attending: OPHTHALMOLOGY
Payer: COMMERCIAL

## 2025-03-31 DIAGNOSIS — Q13.4 PETER'S ANOMALY: ICD-10-CM

## 2025-03-31 DIAGNOSIS — Q15.0 CONGENITAL GLAUCOMA: ICD-10-CM

## 2025-03-31 DIAGNOSIS — H55.01 NYSTAGMUS, CONGENITAL: ICD-10-CM

## 2025-03-31 DIAGNOSIS — Z94.7 CORNEA REPLACED BY TRANSPLANT: Primary | ICD-10-CM

## 2025-03-31 PROCEDURE — 99213 OFFICE O/P EST LOW 20 MIN: CPT | Performed by: OPHTHALMOLOGY

## 2025-03-31 PROCEDURE — 99214 OFFICE O/P EST MOD 30 MIN: CPT | Performed by: OPHTHALMOLOGY

## 2025-03-31 ASSESSMENT — VISUAL ACUITY
CORRECTION_TYPE: GLASSES
OS_CC+: +1
OS_PH_CC: 20/60
OS_CC: 20/60
OD_CC: 20/600
METHOD: SNELLEN - LINEAR
OS_PH_CC+: +2

## 2025-03-31 ASSESSMENT — REFRACTION_WEARINGRX
OD_CYLINDER: +5.75
OS_AXIS: 120
OS_CYLINDER: +3.00
OD_SPHERE: -13.50
SPECS_TYPE: SVL
OD_AXIS: 101
OS_SPHERE: -6.50

## 2025-03-31 ASSESSMENT — EXTERNAL EXAM - LEFT EYE: OS_EXAM: PROPTOSIS R>L, FAIR LID CLOSURE

## 2025-03-31 ASSESSMENT — TONOMETRY
OS_IOP_MMHG: 8
IOP_METHOD: ICARE
OD_IOP_MMHG: 14

## 2025-03-31 ASSESSMENT — EXTERNAL EXAM - RIGHT EYE: OD_EXAM: PROPTOSIS R>L, FAIR LID CLOSURE

## 2025-03-31 NOTE — NURSING NOTE
Chief Complaints and History of Present Illnesses   Patient presents with    Follow Up     Chief Complaint(s) and History of Present Illness(es)       Follow Up              Laterality: both eyes    Quality: blurred vision    Course: stable    Associated symptoms: Negative for dryness, eye pain, flashes and floaters    Treatments tried: eye drops and ointment              Comments    Pt and father have no new concerns.     Ocular Meds:     FML BID both eyes    Cosopt BID both eyes    Xalatan (latanoprost) at bedtime both eyes    Alphagan BID each eye     erythromycin ointment right eye (4x daily)    Shruthi Cardenas OA 12:17 PM March 31, 2025

## 2025-03-31 NOTE — PROGRESS NOTES
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     Interval hx 03/31/2025  Chief Complaint(s) and History of Present Illness(es)       Follow Up    In both eyes.  Characterized as blurred vision.  Since onset it is stable.  Associated symptoms include Negative for dryness, eye pain, flashes and floaters.  Treatments tried include eye drops and ointment.             Comments    Pt and father have no new concerns.     Ocular Meds:     FML BID both eyes    Cosopt BID both eyes    Xalatan (latanoprost) at bedtime both eyes    Alphagan BID each eye     erythromycin ointment right eye (4x daily)    Shruthimeghna Cardenas OA 12:17 PM March 31, 2025                      POHx:  - h/o Pressley anomaly both eyes 2000  - s/p repeat PK RE (8/18/21) (Jessica),   - s/p PPV/GENTRY right eye 4/17/23  - S/p bilateral penetrating keratoplasty (PK) in 2000 age 12 days and 14 days   - s/p EDTA chelation right eye (See above), with remaining scarring.    Current Ocular Meds:   - Cosopt BID each eye  - xalatan at bedtime each eye  - alphagan BID left eye  - FML BID each eye    - Erythromycin ointment QID right eye  - mary ointment QHS OS     - Artificial tears PRN each eye     Assessment and Plan:  # s/p EDTA chelation and superficial  Keratectomy right eye 9/3/20 (Dr. Salinas)     # Pressley anomaly both eyes 2000  - s/p repeat PK RE (8/18/21) (Jessica),   - s/p PPV/GENTRY right eye 4/17/23  - S/p bilateral penetrating keratoplasty (PK) in 2000 age 12 days and 14 days   - s/p EDTA chelation right eye (See above), with remaining scarring.  - follows with Dr. Fam,  Dr. Webster.   - Previously saw Dr. Garcia prior to PKP, though has not seen again since    4/12/22: Vision stable Surface dryness, no epi defects.  8/16/22: VA somewhat worsened, though subjective vision stability. Diffuse surface dryness   1/24/23: VA stable, diffuse surface dryness, stable  3/23/23: No  epi defect.   05/30/23: No epi defect but surface appearing mildly irregular. Would increase erythromycin to 4x/day  9/5/23: VA stable. IOP wnl.   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 Webster    # Lagophthalmos, each eye  - clear corneas, pt comfortable     PLAN:  - continue FML BID each eye   - Continue erythromycin ointment back to 6x/day right eye   - Continue Anthony jess nightly left eye  - Continue each eye lubrication.  - continue refresh at bedtime both eyes   - Cosopt BID each eye  - xalatan at bedtime each eye  - alphagan BID left eye  - defer CE/IOL OD until precipitous drop in vision. Consider concurrent repeat PKP OD at that time.     RTC: 6 months [place]    Attending Physician Attestation:  Complete documentation of historical and exam elements from today's encounter can be found in the full encounter summary report (not reduplicated in this progress note).  I personally obtained the chief complaint(s) and history of present illness.  I confirmed and edited as necessary the review of systems, past medical/surgical history, family history, social history, and examination findings as documented by others; and I examined the patient myself.  I personally reviewed the relevant tests, images, and reports as documented above.  I formulated and edited as necessary the assessment and plan and discussed the findings and management plan with the patient and family. - Carter Salinas MD

## 2025-05-13 DIAGNOSIS — Z94.7 CORNEA REPLACED BY TRANSPLANT: Primary | ICD-10-CM

## 2025-05-13 RX ORDER — ERYTHROMYCIN 5 MG/G
OINTMENT OPHTHALMIC
Qty: 7 G | Refills: 10 | Status: SHIPPED | OUTPATIENT
Start: 2025-05-13

## 2025-07-06 DIAGNOSIS — Q13.4 PETER'S ANOMALY: ICD-10-CM

## 2025-07-07 RX ORDER — DORZOLAMIDE HYDROCHLORIDE AND TIMOLOL MALEATE 20; 5 MG/ML; MG/ML
1 SOLUTION/ DROPS OPHTHALMIC 2 TIMES DAILY
Qty: 10 ML | Refills: 2 | Status: SHIPPED | OUTPATIENT
Start: 2025-07-07

## 2025-07-07 NOTE — TELEPHONE ENCOUNTER
Medication:     Requested directions: and  Current directions on the medication list:   dorzolamide-timolol (COSOPT) 2-0.5 % ophthalmic solution 10 mL 3 11/17/2024 -- No   Sig - Route: Place 1 drop into both eyes 2 times daily. - Both Eyes       Last Office Visit: 3-31-25  Future Office visit: 10-6-25    Attending Provider: Rita  Last Clinic Note: - Cosopt BID each eye     Per eye protocol

## 2025-07-27 DIAGNOSIS — Q15.0 CONGENITAL GLAUCOMA: ICD-10-CM

## 2025-08-04 ENCOUNTER — OFFICE VISIT (OUTPATIENT)
Dept: OPHTHALMOLOGY | Facility: CLINIC | Age: 25
End: 2025-08-04
Attending: OPHTHALMOLOGY
Payer: COMMERCIAL

## 2025-08-04 DIAGNOSIS — Q15.0 CONGENITAL GLAUCOMA: Primary | ICD-10-CM

## 2025-08-04 DIAGNOSIS — H55.01 NYSTAGMUS, CONGENITAL: ICD-10-CM

## 2025-08-04 DIAGNOSIS — Q13.4 PETER'S ANOMALY: ICD-10-CM

## 2025-08-04 DIAGNOSIS — Q15.0 CONGENITAL GLAUCOMA OF RIGHT EYE: ICD-10-CM

## 2025-08-04 DIAGNOSIS — Z94.7 PENETRATING KERATOPLASTY GRAFT IN PLACE: ICD-10-CM

## 2025-08-04 DIAGNOSIS — Z94.7 CORNEA REPLACED BY TRANSPLANT: ICD-10-CM

## 2025-08-04 PROCEDURE — 92014 COMPRE OPH EXAM EST PT 1/>: CPT | Performed by: OPHTHALMOLOGY

## 2025-08-04 PROCEDURE — 99213 OFFICE O/P EST LOW 20 MIN: CPT | Performed by: OPHTHALMOLOGY

## 2025-08-04 PROCEDURE — 92015 DETERMINE REFRACTIVE STATE: CPT

## 2025-08-04 ASSESSMENT — CONF VISUAL FIELD
OS_NORMAL: 1
OD_INFERIOR_TEMPORAL_RESTRICTION: 0
OS_SUPERIOR_NASAL_RESTRICTION: 0
OS_SUPERIOR_TEMPORAL_RESTRICTION: 0
OS_INFERIOR_TEMPORAL_RESTRICTION: 0
OD_INFERIOR_NASAL_RESTRICTION: 0
OD_SUPERIOR_TEMPORAL_RESTRICTION: 0
OS_INFERIOR_NASAL_RESTRICTION: 0
OD_SUPERIOR_NASAL_RESTRICTION: 0
OD_NORMAL: 1

## 2025-08-04 ASSESSMENT — TONOMETRY
OD_IOP_MMHG: 11
OS_IOP_MMHG: 10
IOP_METHOD: ICARE T/B

## 2025-08-04 ASSESSMENT — REFRACTION_WEARINGRX
OD_CYLINDER: +5.75
OS_SPHERE: -6.50
OD_AXIS: 110
OD_SPHERE: -13.75
OS_CYLINDER: +3.00
SPECS_TYPE: SVL
OS_AXIS: 120

## 2025-08-04 ASSESSMENT — VISUAL ACUITY
METHOD: SNELLEN - LINEAR
OD_CC: 20/600
CORRECTION_TYPE: GLASSES
OS_CC+: +1
OS_CC: 20/70

## 2025-08-04 ASSESSMENT — REFRACTION
OS_CYLINDER: +3.00
OS_AXIS: 120
OD_SPHERE: NO VIEW
OS_SPHERE: -6.00

## 2025-08-05 ASSESSMENT — PACHYMETRY: OD_CT(UM): 657

## 2025-08-05 ASSESSMENT — EXTERNAL EXAM - RIGHT EYE: OD_EXAM: PROPTOSIS R>L, FAIR LID CLOSURE

## 2025-08-05 ASSESSMENT — EXTERNAL EXAM - LEFT EYE: OS_EXAM: PROPTOSIS R>L, FAIR LID CLOSURE

## 2025-08-07 ENCOUNTER — MYC MEDICAL ADVICE (OUTPATIENT)
Dept: OPHTHALMOLOGY | Facility: CLINIC | Age: 25
End: 2025-08-07
Payer: COMMERCIAL

## 2025-08-07 DIAGNOSIS — Z94.7 CORNEA REPLACED BY TRANSPLANT: ICD-10-CM

## 2025-08-07 RX ORDER — ERYTHROMYCIN 5 MG/G
OINTMENT OPHTHALMIC
Qty: 7 G | Refills: 10 | Status: SHIPPED | OUTPATIENT
Start: 2025-08-07

## 2025-08-07 RX ORDER — LATANOPROST 50 UG/ML
1 SOLUTION/ DROPS OPHTHALMIC AT BEDTIME
Qty: 2.5 ML | Refills: 11 | Status: SHIPPED | OUTPATIENT
Start: 2025-08-07

## 2025-08-25 ENCOUNTER — MYC REFILL (OUTPATIENT)
Dept: OPHTHALMOLOGY | Facility: CLINIC | Age: 25
End: 2025-08-25
Payer: COMMERCIAL

## 2025-08-25 DIAGNOSIS — Q15.0 CONGENITAL GLAUCOMA: ICD-10-CM

## 2025-08-26 RX ORDER — LATANOPROST 50 UG/ML
1 SOLUTION/ DROPS OPHTHALMIC AT BEDTIME
Qty: 2.5 ML | Refills: 11 | Status: SHIPPED | OUTPATIENT
Start: 2025-08-26

## 2025-08-28 ENCOUNTER — TELEPHONE (OUTPATIENT)
Dept: OPHTHALMOLOGY | Facility: CLINIC | Age: 25
End: 2025-08-28
Payer: COMMERCIAL

## (undated) DEVICE — NDL 18GA 1.5" 305196

## (undated) DEVICE — BLADE KNIFE BEAVER MICROSHARP GREEN 377515

## (undated) DEVICE — SU ETHILON 10-0 CS160-8 DA 12" 9030

## (undated) DEVICE — EYE PREP BETADINE 5% SOLUTION 30ML 0065-0411-30

## (undated) DEVICE — LINEN TOWEL PACK X5 5464

## (undated) DEVICE — APPLICATOR COTTON TIP 6"X2 STERILE LF 6012

## (undated) DEVICE — DRSG TEGADERM 4X4 3/4" 1626W

## (undated) DEVICE — SU ETHILON 8-0 TG175-8 12" 1716G

## (undated) DEVICE — SYR 05ML SLIP TIP W/O NDL

## (undated) DEVICE — STRAP KNEE/BODY 31143004

## (undated) DEVICE — ESU CORD BIPOLAR GREEN 10-4000

## (undated) DEVICE — APPLICATORS COTTON TIP 6"X2 STERILE LF C15053-006

## (undated) DEVICE — EYE CANN SOFT TIP 25GA FOR VALVED SET 8065149530

## (undated) DEVICE — Device

## (undated) DEVICE — NDL 23GA 1" 305145

## (undated) DEVICE — EYE CANN IRR 30GA  ANTERIOR CHAMBER 581273

## (undated) DEVICE — PEN MARKING SKIN FINE 31145942

## (undated) DEVICE — EYE SHIELD PLASTIC CLEAR UNIVERSAL K9-6050

## (undated) DEVICE — SPONGE SPEAR WECK CEL 6/PKG 0008680

## (undated) DEVICE — SYR 05ML LL W/O NDL

## (undated) DEVICE — EYE PACK 25GA CONSTELLATION 10,000 CPM PPK9380-04

## (undated) DEVICE — NDL 22GA 1.5"

## (undated) DEVICE — EYE DRAPE MICROSCOPE UNIVERSAL (BIOM FULL) 08-MK55140

## (undated) DEVICE — SOL NACL 0.9% 10ML VIAL 0409-4888-02

## (undated) DEVICE — PACK VITRECTOMY/RET CUSTOM ASC PQ15VRU12

## (undated) DEVICE — APPLICATORS COTTON-TIPPED 3" PKG OF 2 C15050-003

## (undated) DEVICE — PEN MARKING SKIN TYCO DEVON DUAL TIP 31145868

## (undated) DEVICE — DRSG EYE PAD STERILE 1.63X2.63" NON21600

## (undated) DEVICE — POSITIONER ARMBOARD FOAM 1PAIR LF FP-ARMB1

## (undated) DEVICE — ESU HOLSTER PLASTIC DISP E2400

## (undated) DEVICE — DRAPE STOCKINETTE IMPERVIOUS 10" 21048

## (undated) DEVICE — CONTAINER SPECIMEN 4OZ STERILE 17099

## (undated) DEVICE — TAPE TRANSPORE 1"X1.5YD 1534S-1

## (undated) DEVICE — DRSG GAUZE 4X4" 8044

## (undated) DEVICE — GLOVE BIOGEL PI MICRO SZ 7.0 48570

## (undated) DEVICE — SYR 01ML LL W/O NDL LATEX FREE 309628

## (undated) DEVICE — SU VICRYL 5-0 S-14DA 18" J571G

## (undated) DEVICE — EYE COVER TONOPEN OCU FILM LATEX 230651-002

## (undated) DEVICE — EYE SOL BSS 500ML BAG 0065-1795-04

## (undated) DEVICE — GLOVE PROTEXIS W/NEU-THERA 8.0  2D73TE80

## (undated) DEVICE — EYE LENS BNDG CONTACT PRECISION SPHERE III KONTUR 9.0X20MM

## (undated) DEVICE — EYE PROBE LASER 25GA FLEX RFID 8065751114

## (undated) DEVICE — NDL 30GA 0.5" 305106

## (undated) DEVICE — PACK MINOR EYE

## (undated) DEVICE — SU ETHILON 10-0 CS160-6 12" 9000G

## (undated) DEVICE — SU PLAIN 6-0 TG140-8 18" 1735G

## (undated) DEVICE — GLOVE PROTEXIS MICRO 6.5  2D73PM65

## (undated) DEVICE — PACK CATARACT CUSTOM ASC SEY15CPUMC

## (undated) DEVICE — SU VICRYL 8-0 BV130-5 5" J401G

## (undated) DEVICE — SPECIMEN CONTAINER URINE 90ML STERILE 75.1435.002

## (undated) DEVICE — SU VICRYL 7-0 TG140-8DA 18" J546G

## (undated) DEVICE — EYE SHIELD PLASTIC

## (undated) DEVICE — EYE FLUORESCEIN OPHTHALMIC STRIP FLO-GLO 1272111

## (undated) DEVICE — SOL WATER IRRIG 500ML BOTTLE 2F7113

## (undated) DEVICE — EYE TIP BIPOLAR 18GA ERASER 221250

## (undated) RX ORDER — LIDOCAINE/PRILOCAINE 2.5 %-2.5%
CREAM (GRAM) TOPICAL
Status: DISPENSED
Start: 2023-04-17

## (undated) RX ORDER — FENTANYL CITRATE 50 UG/ML
INJECTION, SOLUTION INTRAMUSCULAR; INTRAVENOUS
Status: DISPENSED
Start: 2020-09-03

## (undated) RX ORDER — PROPOFOL 10 MG/ML
INJECTION, EMULSION INTRAVENOUS
Status: DISPENSED
Start: 2022-04-14

## (undated) RX ORDER — ONDANSETRON 2 MG/ML
INJECTION INTRAMUSCULAR; INTRAVENOUS
Status: DISPENSED
Start: 2022-04-14

## (undated) RX ORDER — LIDOCAINE HYDROCHLORIDE 20 MG/ML
INJECTION, SOLUTION EPIDURAL; INFILTRATION; INTRACAUDAL; PERINEURAL
Status: DISPENSED
Start: 2021-10-13

## (undated) RX ORDER — ACETAMINOPHEN 325 MG/1
TABLET ORAL
Status: DISPENSED
Start: 2021-08-18

## (undated) RX ORDER — ACETAMINOPHEN 325 MG/10.15ML
LIQUID ORAL
Status: DISPENSED
Start: 2021-08-18

## (undated) RX ORDER — SODIUM CHLORIDE, SODIUM LACTATE, POTASSIUM CHLORIDE, CALCIUM CHLORIDE 600; 310; 30; 20 MG/100ML; MG/100ML; MG/100ML; MG/100ML
INJECTION, SOLUTION INTRAVENOUS
Status: DISPENSED
Start: 2021-10-13

## (undated) RX ORDER — DEXAMETHASONE SODIUM PHOSPHATE 4 MG/ML
INJECTION, SOLUTION INTRA-ARTICULAR; INTRALESIONAL; INTRAMUSCULAR; INTRAVENOUS; SOFT TISSUE
Status: DISPENSED
Start: 2021-10-13

## (undated) RX ORDER — EPHEDRINE SULFATE 50 MG/ML
INJECTION, SOLUTION INTRAMUSCULAR; INTRAVENOUS; SUBCUTANEOUS
Status: DISPENSED
Start: 2021-10-13

## (undated) RX ORDER — GLYCOPYRROLATE 0.2 MG/ML
INJECTION INTRAMUSCULAR; INTRAVENOUS
Status: DISPENSED
Start: 2021-10-13

## (undated) RX ORDER — ACETAMINOPHEN 325 MG/10.15ML
LIQUID ORAL
Status: DISPENSED
Start: 2023-03-13

## (undated) RX ORDER — ONDANSETRON 2 MG/ML
INJECTION INTRAMUSCULAR; INTRAVENOUS
Status: DISPENSED
Start: 2021-08-18

## (undated) RX ORDER — CEFAZOLIN SODIUM 1 G/3ML
INJECTION, POWDER, FOR SOLUTION INTRAMUSCULAR; INTRAVENOUS
Status: DISPENSED
Start: 2022-04-14

## (undated) RX ORDER — KETAMINE HYDROCHLORIDE 100 MG/ML
INJECTION, SOLUTION INTRAMUSCULAR; INTRAVENOUS
Status: DISPENSED
Start: 2021-10-13

## (undated) RX ORDER — PROPOFOL 10 MG/ML
INJECTION, EMULSION INTRAVENOUS
Status: DISPENSED
Start: 2021-08-18

## (undated) RX ORDER — CYCLOPENTOLAT/TROPIC/PHENYLEPH 1%-1%-2.5%
DROPS (EA) OPHTHALMIC (EYE)
Status: DISPENSED
Start: 2023-04-17

## (undated) RX ORDER — ATROPINE SULFATE 0.4 MG/ML
AMPUL (ML) INJECTION
Status: DISPENSED
Start: 2021-10-13

## (undated) RX ORDER — CYCLOPENTOLAT/TROPIC/PHENYLEPH 1%-1%-2.5%
DROPS (EA) OPHTHALMIC (EYE)
Status: DISPENSED
Start: 2023-03-13

## (undated) RX ORDER — LIDOCAINE HYDROCHLORIDE 20 MG/ML
INJECTION, SOLUTION EPIDURAL; INFILTRATION; INTRACAUDAL; PERINEURAL
Status: DISPENSED
Start: 2022-04-14

## (undated) RX ORDER — ONDANSETRON 2 MG/ML
INJECTION INTRAMUSCULAR; INTRAVENOUS
Status: DISPENSED
Start: 2021-10-13

## (undated) RX ORDER — FENTANYL CITRATE 50 UG/ML
INJECTION, SOLUTION INTRAMUSCULAR; INTRAVENOUS
Status: DISPENSED
Start: 2023-04-17

## (undated) RX ORDER — PROPARACAINE HYDROCHLORIDE 5 MG/ML
SOLUTION/ DROPS OPHTHALMIC
Status: DISPENSED
Start: 2023-04-17

## (undated) RX ORDER — MIDAZOLAM HYDROCHLORIDE 2 MG/ML
SYRUP ORAL
Status: DISPENSED
Start: 2021-10-13

## (undated) RX ORDER — FENTANYL CITRATE-0.9 % NACL/PF 10 MCG/ML
PLASTIC BAG, INJECTION (ML) INTRAVENOUS
Status: DISPENSED
Start: 2021-10-13

## (undated) RX ORDER — ONDANSETRON 2 MG/ML
INJECTION INTRAMUSCULAR; INTRAVENOUS
Status: DISPENSED
Start: 2020-09-03

## (undated) RX ORDER — SCOLOPAMINE TRANSDERMAL SYSTEM 1 MG/1
PATCH, EXTENDED RELEASE TRANSDERMAL
Status: DISPENSED
Start: 2023-03-13

## (undated) RX ORDER — LIDOCAINE HYDROCHLORIDE 20 MG/ML
INJECTION, SOLUTION EPIDURAL; INFILTRATION; INTRACAUDAL; PERINEURAL
Status: DISPENSED
Start: 2021-08-18

## (undated) RX ORDER — MOXIFLOXACIN 5 MG/ML
SOLUTION/ DROPS OPHTHALMIC
Status: DISPENSED
Start: 2021-10-13

## (undated) RX ORDER — EPHEDRINE SULFATE 50 MG/ML
INJECTION, SOLUTION INTRAMUSCULAR; INTRAVENOUS; SUBCUTANEOUS
Status: DISPENSED
Start: 2023-03-13

## (undated) RX ORDER — MIDAZOLAM HYDROCHLORIDE 2 MG/ML
SYRUP ORAL
Status: DISPENSED
Start: 2023-03-13

## (undated) RX ORDER — FENTANYL CITRATE 50 UG/ML
INJECTION, SOLUTION INTRAMUSCULAR; INTRAVENOUS
Status: DISPENSED
Start: 2021-08-18

## (undated) RX ORDER — ACETAMINOPHEN 325 MG/10.15ML
LIQUID ORAL
Status: DISPENSED
Start: 2021-10-13

## (undated) RX ORDER — SCOLOPAMINE TRANSDERMAL SYSTEM 1 MG/1
PATCH, EXTENDED RELEASE TRANSDERMAL
Status: DISPENSED
Start: 2023-04-17

## (undated) RX ORDER — MIDAZOLAM HYDROCHLORIDE 2 MG/ML
SYRUP ORAL
Status: DISPENSED
Start: 2023-04-17

## (undated) RX ORDER — PROPARACAINE HYDROCHLORIDE 5 MG/ML
SOLUTION/ DROPS OPHTHALMIC
Status: DISPENSED
Start: 2021-10-13

## (undated) RX ORDER — FENTANYL CITRATE 50 UG/ML
INJECTION, SOLUTION INTRAMUSCULAR; INTRAVENOUS
Status: DISPENSED
Start: 2023-03-13

## (undated) RX ORDER — MIDAZOLAM HYDROCHLORIDE 2 MG/ML
SYRUP ORAL
Status: DISPENSED
Start: 2021-08-18

## (undated) RX ORDER — PROPOFOL 10 MG/ML
INJECTION, EMULSION INTRAVENOUS
Status: DISPENSED
Start: 2021-10-13

## (undated) RX ORDER — EPHEDRINE SULFATE 50 MG/ML
INJECTION, SOLUTION INTRAMUSCULAR; INTRAVENOUS; SUBCUTANEOUS
Status: DISPENSED
Start: 2023-04-17